# Patient Record
Sex: FEMALE | Race: WHITE | NOT HISPANIC OR LATINO | Employment: OTHER | URBAN - METROPOLITAN AREA
[De-identification: names, ages, dates, MRNs, and addresses within clinical notes are randomized per-mention and may not be internally consistent; named-entity substitution may affect disease eponyms.]

---

## 2023-10-09 ENCOUNTER — HOSPITAL ENCOUNTER (INPATIENT)
Facility: HOSPITAL | Age: 63
LOS: 9 days | Discharge: LEFT BEFORE DISCHARGE | DRG: 917 | End: 2023-10-18
Attending: INTERNAL MEDICINE | Admitting: STUDENT IN AN ORGANIZED HEALTH CARE EDUCATION/TRAINING PROGRAM
Payer: COMMERCIAL

## 2023-10-09 ENCOUNTER — APPOINTMENT (OUTPATIENT)
Dept: RADIOLOGY | Facility: HOSPITAL | Age: 63
DRG: 917 | End: 2023-10-09
Payer: COMMERCIAL

## 2023-10-09 ENCOUNTER — APPOINTMENT (OUTPATIENT)
Dept: CARDIOLOGY | Facility: HOSPITAL | Age: 63
DRG: 917 | End: 2023-10-09
Payer: COMMERCIAL

## 2023-10-09 DIAGNOSIS — M79.661 BILATERAL CALF PAIN: Primary | ICD-10-CM

## 2023-10-09 DIAGNOSIS — R57.0 SHOCK, CARDIOGENIC (MULTI): ICD-10-CM

## 2023-10-09 DIAGNOSIS — R00.1 BRADYCARDIA: ICD-10-CM

## 2023-10-09 DIAGNOSIS — M79.662 BILATERAL CALF PAIN: Primary | ICD-10-CM

## 2023-10-09 LAB
ABO GROUP (TYPE) IN BLOOD: NORMAL
ALBUMIN SERPL BCP-MCNC: 2.8 G/DL (ref 3.4–5)
ALBUMIN SERPL BCP-MCNC: 3 G/DL (ref 3.4–5)
ALBUMIN SERPL BCP-MCNC: 3.2 G/DL (ref 3.4–5)
ALP SERPL-CCNC: 112 U/L (ref 33–136)
ALP SERPL-CCNC: 145 U/L (ref 33–136)
ALP SERPL-CCNC: 85 U/L (ref 33–136)
ALT SERPL W P-5'-P-CCNC: 259 U/L (ref 7–45)
ALT SERPL W P-5'-P-CCNC: 279 U/L (ref 7–45)
ALT SERPL W P-5'-P-CCNC: 311 U/L (ref 7–45)
ANION GAP BLDA CALCULATED.4IONS-SCNC: 12 MMO/L (ref 10–25)
ANION GAP BLDA CALCULATED.4IONS-SCNC: 6 MMO/L (ref 10–25)
ANION GAP BLDMV CALCULATED.4IONS-SCNC: 5 MMO/L (ref 10–25)
ANION GAP BLDMV CALCULATED.4IONS-SCNC: 9 MMO/L (ref 10–25)
ANION GAP SERPL CALC-SCNC: 10 MMOL/L (ref 10–20)
ANION GAP SERPL CALC-SCNC: 11 MMOL/L (ref 10–20)
ANION GAP SERPL CALC-SCNC: 16 MMOL/L (ref 10–20)
ANTIBODY SCREEN: NORMAL
APAP SERPL-MCNC: <10 UG/ML
APTT PPP: 29 SECONDS (ref 27–38)
AST SERPL W P-5'-P-CCNC: 1358 U/L (ref 9–39)
AST SERPL W P-5'-P-CCNC: 735 U/L (ref 9–39)
AST SERPL W P-5'-P-CCNC: 843 U/L (ref 9–39)
BASE EXCESS BLDA CALC-SCNC: -1.2 MMOL/L (ref -2–3)
BASE EXCESS BLDA CALC-SCNC: -3.8 MMOL/L (ref -2–3)
BASE EXCESS BLDMV CALC-SCNC: -0.7 MMOL/L (ref -2–3)
BASE EXCESS BLDMV CALC-SCNC: -1.1 MMOL/L (ref -2–3)
BASOPHILS # BLD AUTO: 0.02 X10*3/UL (ref 0–0.1)
BASOPHILS NFR BLD AUTO: 0.2 %
BILIRUB SERPL-MCNC: 0.8 MG/DL (ref 0–1.2)
BODY TEMPERATURE: 37 DEGREES CELSIUS
BUN SERPL-MCNC: 11 MG/DL (ref 6–23)
BUN SERPL-MCNC: 11 MG/DL (ref 6–23)
BUN SERPL-MCNC: 14 MG/DL (ref 6–23)
CA-I BLDA-SCNC: 1.67 MMOL/L (ref 1.1–1.33)
CA-I BLDA-SCNC: 1.91 MMOL/L (ref 1.1–1.33)
CA-I BLDMV-SCNC: 1.63 MMOL/L (ref 1.1–1.33)
CA-I BLDMV-SCNC: 1.94 MMOL/L (ref 1.1–1.33)
CALCIUM SERPL-MCNC: 10.8 MG/DL (ref 8.6–10.6)
CALCIUM SERPL-MCNC: 11.7 MG/DL (ref 8.6–10.6)
CALCIUM SERPL-MCNC: 12.8 MG/DL (ref 8.6–10.6)
CHLORIDE BLD-SCNC: 92 MMOL/L (ref 98–107)
CHLORIDE BLD-SCNC: 96 MMOL/L (ref 98–107)
CHLORIDE BLDA-SCNC: 100 MMOL/L (ref 98–107)
CHLORIDE BLDA-SCNC: 92 MMOL/L (ref 98–107)
CHLORIDE SERPL-SCNC: 93 MMOL/L (ref 98–107)
CHLORIDE SERPL-SCNC: 97 MMOL/L (ref 98–107)
CHLORIDE SERPL-SCNC: 98 MMOL/L (ref 98–107)
CO2 SERPL-SCNC: 20 MMOL/L (ref 21–32)
CO2 SERPL-SCNC: 23 MMOL/L (ref 21–32)
CO2 SERPL-SCNC: 24 MMOL/L (ref 21–32)
CREAT SERPL-MCNC: 0.64 MG/DL (ref 0.5–1.05)
CREAT SERPL-MCNC: 0.81 MG/DL (ref 0.5–1.05)
CREAT SERPL-MCNC: 1.31 MG/DL (ref 0.5–1.05)
EJECTION FRACTION APICAL 4 CHAMBER: 51.9
EOSINOPHIL # BLD AUTO: 0.03 X10*3/UL (ref 0–0.7)
EOSINOPHIL NFR BLD AUTO: 0.3 %
ERYTHROCYTE [DISTWIDTH] IN BLOOD BY AUTOMATED COUNT: 10.8 % (ref 11.5–14.5)
GFR SERPL CREATININE-BSD FRML MDRD: 46 ML/MIN/1.73M*2
GFR SERPL CREATININE-BSD FRML MDRD: 82 ML/MIN/1.73M*2
GFR SERPL CREATININE-BSD FRML MDRD: >90 ML/MIN/1.73M*2
GLUCOSE BLD MANUAL STRIP-MCNC: 113 MG/DL (ref 74–99)
GLUCOSE BLD MANUAL STRIP-MCNC: 116 MG/DL (ref 74–99)
GLUCOSE BLD MANUAL STRIP-MCNC: 120 MG/DL (ref 74–99)
GLUCOSE BLD MANUAL STRIP-MCNC: 121 MG/DL (ref 74–99)
GLUCOSE BLD MANUAL STRIP-MCNC: 123 MG/DL (ref 74–99)
GLUCOSE BLD MANUAL STRIP-MCNC: 155 MG/DL (ref 74–99)
GLUCOSE BLD MANUAL STRIP-MCNC: 160 MG/DL (ref 74–99)
GLUCOSE BLD MANUAL STRIP-MCNC: 184 MG/DL (ref 74–99)
GLUCOSE BLD MANUAL STRIP-MCNC: 216 MG/DL (ref 74–99)
GLUCOSE BLD MANUAL STRIP-MCNC: 280 MG/DL (ref 74–99)
GLUCOSE BLD MANUAL STRIP-MCNC: 79 MG/DL (ref 74–99)
GLUCOSE BLD MANUAL STRIP-MCNC: 90 MG/DL (ref 74–99)
GLUCOSE BLD MANUAL STRIP-MCNC: 95 MG/DL (ref 74–99)
GLUCOSE BLD MANUAL STRIP-MCNC: 97 MG/DL (ref 74–99)
GLUCOSE BLD-MCNC: 100 MG/DL (ref 74–99)
GLUCOSE BLD-MCNC: 262 MG/DL (ref 74–99)
GLUCOSE BLDA-MCNC: 104 MG/DL (ref 74–99)
GLUCOSE BLDA-MCNC: 465 MG/DL (ref 74–99)
GLUCOSE SERPL-MCNC: 113 MG/DL (ref 74–99)
GLUCOSE SERPL-MCNC: 415 MG/DL (ref 74–99)
GLUCOSE SERPL-MCNC: 99 MG/DL (ref 74–99)
HCO3 BLDA-SCNC: 21.5 MMOL/L (ref 22–26)
HCO3 BLDA-SCNC: 22.4 MMOL/L (ref 22–26)
HCO3 BLDMV-SCNC: 24.8 MMOL/L (ref 22–26)
HCO3 BLDMV-SCNC: 24.9 MMOL/L (ref 22–26)
HCT VFR BLD AUTO: 32.2 % (ref 36–46)
HCT VFR BLD EST: 34 % (ref 36–46)
HCT VFR BLD EST: 35 % (ref 36–46)
HCT VFR BLD EST: 37 % (ref 36–46)
HCT VFR BLD EST: 39 % (ref 36–46)
HGB BLD-MCNC: 11.5 G/DL (ref 12–16)
HGB BLDA-MCNC: 11.4 G/DL (ref 12–16)
HGB BLDA-MCNC: 12.2 G/DL (ref 12–16)
HGB BLDMV-MCNC: 11.6 G/DL (ref 12–16)
HGB BLDMV-MCNC: 13 G/DL (ref 12–16)
IMM GRANULOCYTES # BLD AUTO: 0.07 X10*3/UL (ref 0–0.7)
IMM GRANULOCYTES NFR BLD AUTO: 0.8 % (ref 0–0.9)
INHALED O2 CONCENTRATION: 30 %
INHALED O2 CONCENTRATION: 40 %
INHALED O2 CONCENTRATION: 40 %
INHALED O2 CONCENTRATION: 50 %
INR PPP: 1.2 (ref 0.9–1.1)
LACTATE BLDA-SCNC: 1.7 MMOL/L (ref 0.4–2)
LACTATE BLDA-SCNC: 5.6 MMOL/L (ref 0.4–2)
LACTATE BLDMV-SCNC: 1.1 MMOL/L (ref 0.4–2)
LACTATE BLDMV-SCNC: 2.9 MMOL/L (ref 0.4–2)
LACTATE SERPL-SCNC: 1 MMOL/L (ref 0.4–2)
LACTATE SERPL-SCNC: 2.2 MMOL/L (ref 0.4–2)
LACTATE SERPL-SCNC: 6.3 MMOL/L (ref 0.4–2)
LYMPHOCYTES # BLD AUTO: 1.12 X10*3/UL (ref 1.2–4.8)
LYMPHOCYTES NFR BLD AUTO: 12.5 %
MAGNESIUM SERPL-MCNC: 1.36 MG/DL (ref 1.6–2.4)
MAGNESIUM SERPL-MCNC: 1.58 MG/DL (ref 1.6–2.4)
MCH RBC QN AUTO: 32.7 PG (ref 26–34)
MCHC RBC AUTO-ENTMCNC: 35.7 G/DL (ref 32–36)
MCV RBC AUTO: 92 FL (ref 80–100)
MONOCYTES # BLD AUTO: 0.15 X10*3/UL (ref 0.1–1)
MONOCYTES NFR BLD AUTO: 1.7 %
NEUTROPHILS # BLD AUTO: 7.54 X10*3/UL (ref 1.2–7.7)
NEUTROPHILS NFR BLD AUTO: 84.5 %
NRBC BLD-RTO: 0 /100 WBCS (ref 0–0)
OXYHGB MFR BLDA: 96.6 % (ref 94–98)
OXYHGB MFR BLDA: 97.4 % (ref 94–98)
OXYHGB MFR BLDMV: 67.7 % (ref 45–75)
OXYHGB MFR BLDMV: 71.7 % (ref 45–75)
PCO2 BLDA: 33 MM HG (ref 38–42)
PCO2 BLDA: 39 MM HG (ref 38–42)
PCO2 BLDMV: 44 MM HG (ref 41–51)
PCO2 BLDMV: 45 MM HG (ref 41–51)
PH BLDA: 7.35 PH (ref 7.38–7.42)
PH BLDA: 7.44 PH (ref 7.38–7.42)
PH BLDMV: 7.35 PH (ref 7.33–7.43)
PH BLDMV: 7.36 PH (ref 7.33–7.43)
PLATELET # BLD AUTO: 61 X10*3/UL (ref 150–450)
PMV BLD AUTO: 12.1 FL (ref 7.5–11.5)
PO2 BLDA: 112 MM HG (ref 85–95)
PO2 BLDA: 143 MM HG (ref 85–95)
PO2 BLDMV: 46 MM HG (ref 35–45)
PO2 BLDMV: 48 MM HG (ref 35–45)
POTASSIUM BLDA-SCNC: 2.7 MMOL/L (ref 3.5–5.3)
POTASSIUM BLDA-SCNC: 3.1 MMOL/L (ref 3.5–5.3)
POTASSIUM BLDMV-SCNC: 2.6 MMOL/L (ref 3.5–5.3)
POTASSIUM BLDMV-SCNC: 2.8 MMOL/L (ref 3.5–5.3)
POTASSIUM SERPL-SCNC: 3 MMOL/L (ref 3.5–5.3)
PROT SERPL-MCNC: 4.5 G/DL (ref 6.4–8.2)
PROT SERPL-MCNC: 4.7 G/DL (ref 6.4–8.2)
PROT SERPL-MCNC: 5.2 G/DL (ref 6.4–8.2)
PROTHROMBIN TIME: 13.8 SECONDS (ref 9.8–12.8)
RBC # BLD AUTO: 3.52 X10*6/UL (ref 4–5.2)
RH FACTOR (ANTIGEN D): NORMAL
SAO2 % BLDA: 100 % (ref 94–100)
SAO2 % BLDA: 99 % (ref 94–100)
SAO2 % BLDMV: 70 % (ref 45–75)
SAO2 % BLDMV: 73 % (ref 45–75)
SODIUM BLDA-SCNC: 122 MMOL/L (ref 136–145)
SODIUM BLDA-SCNC: 126 MMOL/L (ref 136–145)
SODIUM BLDMV-SCNC: 123 MMOL/L (ref 136–145)
SODIUM BLDMV-SCNC: 123 MMOL/L (ref 136–145)
SODIUM SERPL-SCNC: 126 MMOL/L (ref 136–145)
SODIUM SERPL-SCNC: 128 MMOL/L (ref 136–145)
SODIUM SERPL-SCNC: 129 MMOL/L (ref 136–145)
WBC # BLD AUTO: 8.9 X10*3/UL (ref 4.4–11.3)

## 2023-10-09 PROCEDURE — 85025 COMPLETE CBC W/AUTO DIFF WBC: CPT

## 2023-10-09 PROCEDURE — 33210 INSERT ELECTRD/PM CATH SNGL: CPT | Performed by: INTERNAL MEDICINE

## 2023-10-09 PROCEDURE — B214YZZ FLUOROSCOPY OF RIGHT HEART USING OTHER CONTRAST: ICD-10-PCS | Performed by: STUDENT IN AN ORGANIZED HEALTH CARE EDUCATION/TRAINING PROGRAM

## 2023-10-09 PROCEDURE — 93451 RIGHT HEART CATH: CPT | Performed by: INTERNAL MEDICINE

## 2023-10-09 PROCEDURE — 84295 ASSAY OF SERUM SODIUM: CPT

## 2023-10-09 PROCEDURE — C1894 INTRO/SHEATH, NON-LASER: HCPCS | Performed by: INTERNAL MEDICINE

## 2023-10-09 PROCEDURE — 99223 1ST HOSP IP/OBS HIGH 75: CPT | Performed by: EMERGENCY MEDICINE

## 2023-10-09 PROCEDURE — 36415 COLL VENOUS BLD VENIPUNCTURE: CPT

## 2023-10-09 PROCEDURE — 93010 ELECTROCARDIOGRAM REPORT: CPT | Performed by: INTERNAL MEDICINE

## 2023-10-09 PROCEDURE — 82947 ASSAY GLUCOSE BLOOD QUANT: CPT

## 2023-10-09 PROCEDURE — 93306 TTE W/DOPPLER COMPLETE: CPT

## 2023-10-09 PROCEDURE — 37799 UNLISTED PX VASCULAR SURGERY: CPT | Performed by: STUDENT IN AN ORGANIZED HEALTH CARE EDUCATION/TRAINING PROGRAM

## 2023-10-09 PROCEDURE — 93503 INSERT/PLACE HEART CATHETER: CPT | Performed by: INTERNAL MEDICINE

## 2023-10-09 PROCEDURE — 2500000004 HC RX 250 GENERAL PHARMACY W/ HCPCS (ALT 636 FOR OP/ED)

## 2023-10-09 PROCEDURE — 80143 DRUG ASSAY ACETAMINOPHEN: CPT | Performed by: EMERGENCY MEDICINE

## 2023-10-09 PROCEDURE — 71045 X-RAY EXAM CHEST 1 VIEW: CPT | Performed by: RADIOLOGY

## 2023-10-09 PROCEDURE — 94002 VENT MGMT INPAT INIT DAY: CPT

## 2023-10-09 PROCEDURE — 0BH17EZ INSERTION OF ENDOTRACHEAL AIRWAY INTO TRACHEA, VIA NATURAL OR ARTIFICIAL OPENING: ICD-10-PCS | Performed by: STUDENT IN AN ORGANIZED HEALTH CARE EDUCATION/TRAINING PROGRAM

## 2023-10-09 PROCEDURE — 2580000001 HC RX 258 IV SOLUTIONS

## 2023-10-09 PROCEDURE — 93306 TTE W/DOPPLER COMPLETE: CPT | Performed by: INTERNAL MEDICINE

## 2023-10-09 PROCEDURE — 02HQ32Z INSERTION OF MONITORING DEVICE INTO RIGHT PULMONARY ARTERY, PERCUTANEOUS APPROACH: ICD-10-PCS | Performed by: STUDENT IN AN ORGANIZED HEALTH CARE EDUCATION/TRAINING PROGRAM

## 2023-10-09 PROCEDURE — 2720000007 HC OR 272 NO HCPCS: Performed by: INTERNAL MEDICINE

## 2023-10-09 PROCEDURE — 83605 ASSAY OF LACTIC ACID: CPT | Performed by: INTERNAL MEDICINE

## 2023-10-09 PROCEDURE — 4A023N6 MEASUREMENT OF CARDIAC SAMPLING AND PRESSURE, RIGHT HEART, PERCUTANEOUS APPROACH: ICD-10-PCS | Performed by: STUDENT IN AN ORGANIZED HEALTH CARE EDUCATION/TRAINING PROGRAM

## 2023-10-09 PROCEDURE — 2500000005 HC RX 250 GENERAL PHARMACY W/O HCPCS: Performed by: INTERNAL MEDICINE

## 2023-10-09 PROCEDURE — 82247 BILIRUBIN TOTAL: CPT

## 2023-10-09 PROCEDURE — 82330 ASSAY OF CALCIUM: CPT

## 2023-10-09 PROCEDURE — 83605 ASSAY OF LACTIC ACID: CPT

## 2023-10-09 PROCEDURE — 82805 BLOOD GASES W/O2 SATURATION: CPT | Performed by: STUDENT IN AN ORGANIZED HEALTH CARE EDUCATION/TRAINING PROGRAM

## 2023-10-09 PROCEDURE — 85730 THROMBOPLASTIN TIME PARTIAL: CPT

## 2023-10-09 PROCEDURE — 99291 CRITICAL CARE FIRST HOUR: CPT

## 2023-10-09 PROCEDURE — 83735 ASSAY OF MAGNESIUM: CPT

## 2023-10-09 PROCEDURE — 86850 RBC ANTIBODY SCREEN: CPT

## 2023-10-09 PROCEDURE — 2020000001 HC ICU ROOM DAILY

## 2023-10-09 PROCEDURE — 71045 X-RAY EXAM CHEST 1 VIEW: CPT | Mod: FY

## 2023-10-09 PROCEDURE — 4A133B3 MONITORING OF ARTERIAL PRESSURE, PULMONARY, PERCUTANEOUS APPROACH: ICD-10-PCS | Performed by: STUDENT IN AN ORGANIZED HEALTH CARE EDUCATION/TRAINING PROGRAM

## 2023-10-09 PROCEDURE — 74018 RADEX ABDOMEN 1 VIEW: CPT | Performed by: RADIOLOGY

## 2023-10-09 PROCEDURE — 4A1239Z MONITORING OF CARDIAC OUTPUT, PERCUTANEOUS APPROACH: ICD-10-PCS | Performed by: STUDENT IN AN ORGANIZED HEALTH CARE EDUCATION/TRAINING PROGRAM

## 2023-10-09 PROCEDURE — 74018 RADEX ABDOMEN 1 VIEW: CPT

## 2023-10-09 PROCEDURE — 5A1945Z RESPIRATORY VENTILATION, 24-96 CONSECUTIVE HOURS: ICD-10-PCS | Performed by: STUDENT IN AN ORGANIZED HEALTH CARE EDUCATION/TRAINING PROGRAM

## 2023-10-09 PROCEDURE — 93005 ELECTROCARDIOGRAM TRACING: CPT

## 2023-10-09 PROCEDURE — 2500000005 HC RX 250 GENERAL PHARMACY W/O HCPCS

## 2023-10-09 PROCEDURE — 2500000004 HC RX 250 GENERAL PHARMACY W/ HCPCS (ALT 636 FOR OP/ED): Performed by: STUDENT IN AN ORGANIZED HEALTH CARE EDUCATION/TRAINING PROGRAM

## 2023-10-09 RX ORDER — MAGNESIUM SULFATE HEPTAHYDRATE 40 MG/ML
4 INJECTION, SOLUTION INTRAVENOUS ONCE
Status: COMPLETED | OUTPATIENT
Start: 2023-10-09 | End: 2023-10-10

## 2023-10-09 RX ORDER — LIDOCAINE HYDROCHLORIDE 10 MG/ML
INJECTION, SOLUTION EPIDURAL; INFILTRATION; INTRACAUDAL; PERINEURAL AS NEEDED
Status: DISCONTINUED | OUTPATIENT
Start: 2023-10-09 | End: 2023-10-09 | Stop reason: HOSPADM

## 2023-10-09 RX ORDER — MAGNESIUM SULFATE HEPTAHYDRATE 40 MG/ML
2 INJECTION, SOLUTION INTRAVENOUS ONCE
Status: COMPLETED | OUTPATIENT
Start: 2023-10-09 | End: 2023-10-09

## 2023-10-09 RX ORDER — MIDAZOLAM HYDROCHLORIDE 1 MG/ML
2 INJECTION INTRAMUSCULAR; INTRAVENOUS ONCE
Status: DISCONTINUED | OUTPATIENT
Start: 2023-10-09 | End: 2023-10-10

## 2023-10-09 RX ORDER — MIDAZOLAM HYDROCHLORIDE 1 MG/ML
INJECTION INTRAMUSCULAR; INTRAVENOUS
Status: COMPLETED
Start: 2023-10-09 | End: 2023-10-09

## 2023-10-09 RX ORDER — POTASSIUM CHLORIDE 14.9 MG/ML
20 INJECTION INTRAVENOUS
Status: DISCONTINUED | OUTPATIENT
Start: 2023-10-09 | End: 2023-10-09

## 2023-10-09 RX ORDER — CYCLOSPORINE 0.5 MG/ML
1 EMULSION OPHTHALMIC EVERY 12 HOURS
COMMUNITY
Start: 2023-07-29

## 2023-10-09 RX ORDER — MIDAZOLAM IN NACL, ISO-OSMOTIC 100MG/0.1L
3 PLASTIC BAG, INJECTION (ML) INTRAVENOUS CONTINUOUS
Status: DISCONTINUED | OUTPATIENT
Start: 2023-10-09 | End: 2023-10-10

## 2023-10-09 RX ORDER — POTASSIUM CHLORIDE 29.8 MG/ML
40 INJECTION INTRAVENOUS ONCE
Status: COMPLETED | OUTPATIENT
Start: 2023-10-09 | End: 2023-10-09

## 2023-10-09 RX ORDER — HYDROCODONE BITARTRATE AND ACETAMINOPHEN 10; 325 MG/1; MG/1
1 TABLET ORAL
COMMUNITY
Start: 2023-09-13

## 2023-10-09 RX ORDER — TIZANIDINE 4 MG/1
8 TABLET ORAL NIGHTLY PRN
COMMUNITY

## 2023-10-09 RX ORDER — NOREPINEPHRINE BITARTRATE/D5W 8 MG/250ML
.01-1 PLASTIC BAG, INJECTION (ML) INTRAVENOUS CONTINUOUS
Status: DISCONTINUED | OUTPATIENT
Start: 2023-10-09 | End: 2023-10-10

## 2023-10-09 RX ORDER — DEXTROSE MONOHYDRATE 100 MG/ML
0.3 INJECTION, SOLUTION INTRAVENOUS ONCE AS NEEDED
Status: DISCONTINUED | OUTPATIENT
Start: 2023-10-09 | End: 2023-10-09

## 2023-10-09 RX ORDER — LOSARTAN POTASSIUM 50 MG/1
100 TABLET ORAL DAILY
COMMUNITY

## 2023-10-09 RX ORDER — DEXTROSE 50 % IN WATER (D50W) INTRAVENOUS SYRINGE
25
Status: DISCONTINUED | OUTPATIENT
Start: 2023-10-09 | End: 2023-10-18 | Stop reason: HOSPADM

## 2023-10-09 RX ORDER — DEXTROSE MONOHYDRATE 100 MG/ML
75 INJECTION, SOLUTION INTRAVENOUS ONCE AS NEEDED
Status: COMPLETED | OUTPATIENT
Start: 2023-10-09 | End: 2023-10-09

## 2023-10-09 RX ORDER — VERAPAMIL HYDROCHLORIDE 240 MG/1
240 TABLET, FILM COATED, EXTENDED RELEASE ORAL NIGHTLY
COMMUNITY

## 2023-10-09 RX ORDER — POLYETHYLENE GLYCOL 3350 17 G/17G
17 POWDER, FOR SOLUTION ORAL DAILY
Status: DISCONTINUED | OUTPATIENT
Start: 2023-10-09 | End: 2023-10-10

## 2023-10-09 RX ORDER — MULTIVITAMIN
1 TABLET ORAL DAILY
COMMUNITY

## 2023-10-09 RX ORDER — MIDAZOLAM HYDROCHLORIDE 1 MG/ML
2 INJECTION INTRAMUSCULAR; INTRAVENOUS ONCE
Status: COMPLETED | OUTPATIENT
Start: 2023-10-09 | End: 2023-10-09

## 2023-10-09 RX ORDER — CARVEDILOL 25 MG/1
25 TABLET ORAL
COMMUNITY

## 2023-10-09 RX ORDER — POTASSIUM CHLORIDE 29.8 MG/ML
40 INJECTION INTRAVENOUS ONCE
Status: DISCONTINUED | OUTPATIENT
Start: 2023-10-09 | End: 2023-10-11

## 2023-10-09 RX ORDER — FENTANYL CITRATE-0.9 % NACL/PF 10 MCG/ML
PLASTIC BAG, INJECTION (ML) INTRAVENOUS
Status: COMPLETED
Start: 2023-10-09 | End: 2023-10-09

## 2023-10-09 RX ORDER — POLYETHYLENE GLYCOL 3350 17 G/17G
17 POWDER, FOR SOLUTION ORAL DAILY
Status: DISCONTINUED | OUTPATIENT
Start: 2023-10-09 | End: 2023-10-09

## 2023-10-09 RX ORDER — FENTANYL CITRATE-0.9 % NACL/PF 10 MCG/ML
25-200 PLASTIC BAG, INJECTION (ML) INTRAVENOUS CONTINUOUS
Status: DISCONTINUED | OUTPATIENT
Start: 2023-10-09 | End: 2023-10-10

## 2023-10-09 RX ORDER — HYDROXYZINE HYDROCHLORIDE 50 MG/1
50 TABLET, FILM COATED ORAL NIGHTLY PRN
COMMUNITY

## 2023-10-09 RX ADMIN — VASOPRESSIN 0.03 UNITS/MIN: 0.2 INJECTION INTRAVENOUS at 11:31

## 2023-10-09 RX ADMIN — MAGNESIUM SULFATE HEPTAHYDRATE 4 G: 40 INJECTION, SOLUTION INTRAVENOUS at 23:15

## 2023-10-09 RX ADMIN — PERFLUTREN 2 ML OF DILUTION: 6.52 INJECTION, SUSPENSION INTRAVENOUS at 16:08

## 2023-10-09 RX ADMIN — Medication 150 MCG/HR: at 11:30

## 2023-10-09 RX ADMIN — Medication 250 MCG/HR: at 13:48

## 2023-10-09 RX ADMIN — SODIUM CHLORIDE, SODIUM LACTATE, POTASSIUM CHLORIDE, AND CALCIUM CHLORIDE 1000 ML: 600; 310; 30; 20 INJECTION, SOLUTION INTRAVENOUS at 12:45

## 2023-10-09 RX ADMIN — INSULIN HUMAN 56 UNITS/HR: 1 INJECTION, SOLUTION INTRAVENOUS at 11:25

## 2023-10-09 RX ADMIN — Medication 0.7 MG/KG/HR: at 11:35

## 2023-10-09 RX ADMIN — Medication 150 MCG/HR: at 19:27

## 2023-10-09 RX ADMIN — MAGNESIUM SULFATE HEPTAHYDRATE 2 G: 40 INJECTION, SOLUTION INTRAVENOUS at 12:23

## 2023-10-09 RX ADMIN — ACETYLCYSTEINE 8.6 G: 200 INJECTION, SOLUTION INTRAVENOUS at 14:49

## 2023-10-09 RX ADMIN — DEXTROSE MONOHYDRATE 75 ML/HR: 100 INJECTION, SOLUTION INTRAVENOUS at 11:33

## 2023-10-09 RX ADMIN — POTASSIUM CHLORIDE 40 MEQ: 29.8 INJECTION, SOLUTION INTRAVENOUS at 12:21

## 2023-10-09 RX ADMIN — ACETYLCYSTEINE 5.6 G: 200 INJECTION, SOLUTION INTRAVENOUS at 23:58

## 2023-10-09 RX ADMIN — MIDAZOLAM HYDROCHLORIDE 2 MG: 1 INJECTION INTRAMUSCULAR; INTRAVENOUS at 09:15

## 2023-10-09 RX ADMIN — POTASSIUM CHLORIDE 40 MEQ: 29.8 INJECTION, SOLUTION INTRAVENOUS at 19:56

## 2023-10-09 RX ADMIN — NOREPINEPHRINE BITARTRATE 0.2 MCG/KG/MIN: 8 INJECTION, SOLUTION INTRAVENOUS at 11:31

## 2023-10-09 RX ADMIN — MIDAZOLAM 1 MG/HR: 5 INJECTION INTRAMUSCULAR; INTRAVENOUS at 14:12

## 2023-10-09 RX ADMIN — MIDAZOLAM HYDROCHLORIDE 2 MG: 1 INJECTION, SOLUTION INTRAMUSCULAR; INTRAVENOUS at 09:15

## 2023-10-09 RX ADMIN — ACETYLCYSTEINE 2.8 G: 200 INJECTION, SOLUTION INTRAVENOUS at 16:02

## 2023-10-09 SDOH — SOCIAL STABILITY: SOCIAL INSECURITY: DO YOU FEEL UNSAFE GOING BACK TO THE PLACE WHERE YOU ARE LIVING?: UNABLE TO ASSESS

## 2023-10-09 SDOH — SOCIAL STABILITY: SOCIAL INSECURITY: ARE THERE ANY APPARENT SIGNS OF INJURIES/BEHAVIORS THAT COULD BE RELATED TO ABUSE/NEGLECT?: UNABLE TO ASSESS

## 2023-10-09 SDOH — SOCIAL STABILITY: SOCIAL INSECURITY: ABUSE: ADULT

## 2023-10-09 SDOH — SOCIAL STABILITY: SOCIAL INSECURITY: ARE YOU OR HAVE YOU BEEN THREATENED OR ABUSED PHYSICALLY, EMOTIONALLY, OR SEXUALLY BY ANYONE?: UNABLE TO ASSESS

## 2023-10-09 SDOH — SOCIAL STABILITY: SOCIAL INSECURITY: WERE YOU ABLE TO COMPLETE ALL THE BEHAVIORAL HEALTH SCREENINGS?: NO

## 2023-10-09 SDOH — SOCIAL STABILITY: SOCIAL INSECURITY: DOES ANYONE TRY TO KEEP YOU FROM HAVING/CONTACTING OTHER FRIENDS OR DOING THINGS OUTSIDE YOUR HOME?: UNABLE TO ASSESS

## 2023-10-09 SDOH — SOCIAL STABILITY: SOCIAL INSECURITY: DO YOU FEEL ANYONE HAS EXPLOITED OR TAKEN ADVANTAGE OF YOU FINANCIALLY OR OF YOUR PERSONAL PROPERTY?: UNABLE TO ASSESS

## 2023-10-09 SDOH — SOCIAL STABILITY: SOCIAL INSECURITY: HAS ANYONE EVER THREATENED TO HURT YOUR FAMILY OR YOUR PETS?: UNABLE TO ASSESS

## 2023-10-09 ASSESSMENT — COGNITIVE AND FUNCTIONAL STATUS - GENERAL: PATIENT BASELINE BEDBOUND: NO

## 2023-10-09 ASSESSMENT — PAIN - FUNCTIONAL ASSESSMENT
PAIN_FUNCTIONAL_ASSESSMENT: CPOT (CRITICAL CARE PAIN OBSERVATION TOOL)

## 2023-10-09 NOTE — CONSULTS
Reason For Consult  Overdose-verapamil, losartan, carvedilol; intubated on vaso, levo and insulin gtt    History Of Present Illness  Kathleen Hamman is a 62 y.o. female from an outside hospital after being found unresponsive at home. She was noted to be bradycardic to the 30s, hypotensive to 47/28 and confused. EMS started to pace the patient and she was brought to the OSH where she was intubated, started on ketamine/fent for sedation. Levo, vaso and HIE was started with improvement in her BP and HR. She was transferred to the CICU where she was then taken to the cath lab for a TVP. Med tox was consulted post cath lab/TVP for management of her toxicity.     Upon evaluation in the CICU the patient is intubated, sedated but able to shake head yes/no to questions. She confirmed taking her medications along with wine. She denied intentional self-harm. She did shake her head yes when asked if she had taken acetaminophen but denied other coingestions.     She denied current CP, abdominal pain. ROS was otherwise limited due to her intubation and lack of family at the bedside.     Past Medical History  Unable to be obtained due to intubation/lack of family at bedside    Surgical History  Unable to be obtained due to intubation/lack of family at bedside   Social History  Unable to be obtained due to intubation/lack of family at bedside    Family History  Unable to be obtained due to intubation/lack of family at bedside     Allergies  Unable to be obtained due to intubation/lack of family at bedside    Review of Systems  Limited due to intubation/lack of family at the bedside     Physical Exam  Gen: intubated, sedated but shakes head yes/no; NAD  HEENT: NCAT, ETT in place, MMM, EOMI, no scleral icterus  CV: bradycardic but regular rhythm, no MRG, no LE edema, extremities are well perfused  Resp: intubated, no increased WOB, CTAB  Abdomen: NT, ND, no rigidity  Ext: no edema, well perfused  Skin: warm, no rash/lesions  Neuro:  "sedated but awakens to verbal stimuli, answers questions appropriately; no clonus, rigidity  Psych: intubated, unable to assess     Last Recorded Vitals  Blood pressure 164/69, pulse 52, temperature 35.5 °C (95.9 °F), temperature source Temporal, resp. rate 20, height 1.702 m (5' 7\"), weight 56.8 kg (125 lb 3.5 oz), SpO2 99 %.    Relevant Results  Results for orders placed or performed during the hospital encounter of 10/09/23 (from the past 24 hour(s))   Comprehensive metabolic panel   Result Value Ref Range    Glucose 415 (H) 74 - 99 mg/dL    Sodium 126 (L) 136 - 145 mmol/L    Potassium 3.0 (L) 3.5 - 5.3 mmol/L    Chloride 93 (L) 98 - 107 mmol/L    Bicarbonate 20 (L) 21 - 32 mmol/L    Anion Gap 16 10 - 20 mmol/L    Urea Nitrogen 14 6 - 23 mg/dL    Creatinine 1.31 (H) 0.50 - 1.05 mg/dL    eGFR 46 (L) >60 mL/min/1.73m*2    Calcium 12.8 (H) 8.6 - 10.6 mg/dL    Albumin 3.2 (L) 3.4 - 5.0 g/dL    Alkaline Phosphatase 145 (H) 33 - 136 U/L    Total Protein 5.2 (L) 6.4 - 8.2 g/dL    AST 1,358 (H) 9 - 39 U/L    Bilirubin, Total 0.8 0.0 - 1.2 mg/dL     (H) 7 - 45 U/L   CBC and Auto Differential   Result Value Ref Range    WBC 8.9 4.4 - 11.3 x10*3/uL    nRBC 0.0 0.0 - 0.0 /100 WBCs    RBC 3.52 (L) 4.00 - 5.20 x10*6/uL    Hemoglobin 11.5 (L) 12.0 - 16.0 g/dL    Hematocrit 32.2 (L) 36.0 - 46.0 %    MCV 92 80 - 100 fL    MCH 32.7 26.0 - 34.0 pg    MCHC 35.7 32.0 - 36.0 g/dL    RDW 10.8 (L) 11.5 - 14.5 %    Platelets 61 (L) 150 - 450 x10*3/uL    MPV 12.1 (H) 7.5 - 11.5 fL    Neutrophils % 84.5 40.0 - 80.0 %    Immature Granulocytes %, Automated 0.8 0.0 - 0.9 %    Lymphocytes % 12.5 13.0 - 44.0 %    Monocytes % 1.7 2.0 - 10.0 %    Eosinophils % 0.3 0.0 - 6.0 %    Basophils % 0.2 0.0 - 2.0 %    Neutrophils Absolute 7.54 1.20 - 7.70 x10*3/uL    Immature Granulocytes Absolute, Automated 0.07 0.00 - 0.70 x10*3/uL    Lymphocytes Absolute 1.12 (L) 1.20 - 4.80 x10*3/uL    Monocytes Absolute 0.15 0.10 - 1.00 x10*3/uL    Eosinophils " Absolute 0.03 0.00 - 0.70 x10*3/uL    Basophils Absolute 0.02 0.00 - 0.10 x10*3/uL   Magnesium   Result Value Ref Range    Magnesium 1.36 (L) 1.60 - 2.40 mg/dL   Type and Screen   Result Value Ref Range    ABO TYPE B     Rh TYPE POS     ANTIBODY SCREEN NEG    Coagulation Screen   Result Value Ref Range    Protime 13.8 (H) 9.8 - 12.8 seconds    INR 1.2 (H) 0.9 - 1.1    aPTT 29 27 - 38 seconds   Lactate   Result Value Ref Range    Lactate 6.3 (HH) 0.4 - 2.0 mmol/L   POCT GLUCOSE   Result Value Ref Range    POCT Glucose 280 (H) 74 - 99 mg/dL   BLOOD GAS MIXED VENOUS FULL PANEL   Result Value Ref Range    POCT pH, Mixed 7.35 7.33 - 7.43 pH    POCT pCO2, Mixed 45 41 - 51 mm Hg    POCT pO2, Mixed 46 (H) 35 - 45 mm Hg    POCT SO2, Mixed 70 45 - 75 %    POCT Oxy Hemoglobin, Mixed 67.7 45.0 - 75.0 %    POCT Hematocrit Calculated, Mixed 39.0 36.0 - 46.0 %    POCT Sodium, Mixed 123 (L) 136 - 145 mmol/L    POCT Potassium, Mixed 2.6 (LL) 3.5 - 5.3 mmol/L    POCT Chloride, Mixed 92 (L) 98 - 107 mmol/L    POCT Ionized Calcium, Mixed 1.94 (H) 1.10 - 1.33 mmol/L    POCT Glucose, Mixed 262 (H) 74 - 99 mg/dL    POCT Lactate, Mixed 2.9 (H) 0.4 - 2.0 mmol/L    POCT Base Excess, Mixed -1.1 -2.0 - 3.0 mmol/L    POCT HCO3 Calculated, Mixed 24.8 22.0 - 26.0 mmol/L    POCT Hemoglobin, Mixed 13.0 12.0 - 16.0 g/dL    POCT Anion Gap, Mixed 9 (L) 10 - 25 mmo/L    Patient Temperature 37.0 degrees Celsius    FiO2 40 %   POCT GLUCOSE   Result Value Ref Range    POCT Glucose 216 (H) 74 - 99 mg/dL   POCT GLUCOSE   Result Value Ref Range    POCT Glucose 184 (H) 74 - 99 mg/dL     Scheduled medications   Medication Dose Route Frequency    acetylcysteine  50 mg/kg (Adjusted) intravenous Once    acetylcysteine  100 mg/kg (Adjusted) intravenous Once    acetylcysteine  150 mg/kg intravenous Once    magnesium sulfate  2 g intravenous Once    polyethylene glycol  17 g oral Daily    potassium chloride  40 mEq intravenous Once     PRN medications   Medication     dextrose    glucagon    midazolam    oxygen     fentaNYL, Last Rate: 150 mcg/hr (10/09/23 1130)  insulin regular infusion, Last Rate: 40 Units/hr (10/09/23 1221)  midazolam  norepinephrine, Last Rate: Stopped (10/09/23 1240)         Assessment/Plan     This is a 63 y/o F currently admitted to the CICU after being found unresponsive, hypotensive and bradycardic. She is s/p intubation and TVP placement. Med tox was consulted for further recs    #respiratory failure  #acute toxic encephalopathy  #bradycardia  #hypotension  #hyperglycemia  #hyponatremia  #hypokalemia  #transaminitis    The patient is showing s/sx consistent with verapamil, carvedilol and losartan toxicity with her bradycardia/hypotension/hyperglycemia being most consistent with verapamil. She has received her TVP and is well perfused but remains bradycardic and hyperglycemic despite her insulin gtt.    Hyperglycemia in the setting of CCB overdoses is a poor prognositic indicator due to it being an indicator of the calcium channel blockade on the pancreatic beta islet cell. The insulin gtt will increase cardiac output by providing increase in glucose uptake by the cardiac myocytes and is used as essentially another vasopressor.     Dosing for pressors, in the setting of acute toxicity can fall outside of the normal guidelines for other shock states due to the toxicokinetic variations that can be seen, erratic absorption and difference in physiologies. The maximum dose of a pressor in the setting of an overdose is not a real dose and should be based on each patient's clinic picture.     The patient's elevated AST>ALT with increase in PT can be seen from her hypotension but can also be present in acetaminophen overdoses when the patient has presented late. As there is no known LADAN, recommend adding acetaminophen level to her labs, starting NAC and repeating LFTs to see new baseline now that she is perfusing appropriately.     Recommendations for  hypotension/bradycardia  -Calcium replacement and electrolyte management to drive up her calcium to help overcome calcium channel blockade  -insulin gtt wean by 5u/hr for a goal MAP of 65. If drip is increased please continue BS checks [id number] after adjustment  -wean levo/vaso as tolerated for goal map of >65  -TVP management per primary team    Recs for transaminitis  -Repeat LFTs now that pt is well perfused  -follow up on apap level added to am labs  -start NAC infusion, if LFTs are wnl upon repeat labs and APAP level is negative, can stop the infusion. If they remain elevated and/or if the APAP level is detectable, complete 21 hour course and repeat LFTs/PT 4 hours prior to completion    Recs for overdose/drug toxicity  -psych consult when appropriate due to degree of toxicity and concern for possible overdose    Recs were discussed with bedside nursing and the primary team.     Med tox will continue to follow, please contact through secure messaging for questions/concerns      I have personally spent 90 minutes of critical care time, exclusive of time spent on any procedures, in evaluation and management of this critically ill patient’s condition of verapamil, losartan and carvedilol toxicity with increased LFTs. I provided the following critical care treatment: assessment, documentation, taking additional history, discussion with primary team and nursing, management of antidotes.    Bridgette Eckert, DO

## 2023-10-09 NOTE — H&P
History Of Present Illness  Kathleen Hamman is a 62 y.o. female presenting with bradycardia needing percutaneous pacing. Intubated and sedated.      Past Medical History  No past medical history on file.    Surgical History  No past surgical history on file.     Social History  She has no history on file for tobacco use, alcohol use, and drug use.    Family History  No family history on file.     Allergies  Patient has no allergy information on record.    Review of Systems   All other systems reviewed and are negative.      Physical Exam  General: Sedated and intubated  CV: Paced rhythm @90   Pulmonary: CTAB  GI: soft abd, NT/ND  LE: No LE edema    Last Recorded Vitals  No results found for this or any previous visit (from the past 24 hour(s)).            Assessment/Plan   Principal Problem:    Bradycardia    Plan for TVP         I spent 30 minutes in the professional and overall care of this patient.      Anastasia Padilla MD

## 2023-10-09 NOTE — H&P
History Of Present Illness  Kathleen Hamman is a 62 y.o. female presenting with bradycardia requiring transcutaneous pacing. She is intubated and sedated.    Patient sent by helicopter from [facility] for bradycardia following verapamil intoxication. She was being transcutaneously paced on arrival  at 90. Per EMS and chart review, patient took carvedilol, verapamil, and losartan at home for a headache and drank some wine then later was noted by  to be unresponsive. Unclear quantity of pills taken. On EMS arrival, patient was unresponsive with reported BP 47/28 and HR 30. EMS started to pace patient and she was taken to [facility] ED. She was given 1g calcium chloride IV and 0.1mg/ml IV epi per chart documents. Patient was being paced at 50 veróncia amps and rate of 70 on arrival to  ED. Paced at 100 with rate 70 in ED. Her vitals on arrival to  were T35, HR 70, RR 14, 100% on 4L NC, BP 52/38 with MAP 45. Patient intubated at approximately 0342 am 10/9. Patient had R radial arterial line replaced with R fem arterial line placed at .  ECG showing sinus bradycardia.     Labs from  ED 10/9 2:20 am  BUN 13, Na 123, K 5, Cl 98, venous CO2 13, glucose 276, Cr 1.2, BUN/Cr 10.8, total protein 5.6, ALB 3.1,  calcium 11.4, T bili 0.5, alk phos 70, GOT 70, GPT 70, osmolality 258, alcohol 91, urine tox positive for opiates, troponin 3, TSH 15.42, T4 free 0.73, acetaminophen 16.6, salicylate level <3, PT 10.9, INR 1, aPTT 28  CBC: 3.9/11/32.5/plts clumped    UA: large blood, 100mg/dl protein, nitrite negative, moderate leukocyte esterase, >150, WBC >30, few bacteria, 19 epithelial cells    ABG 10/9 02:59: 7.175/18.6/128.9  ABG 10/9 04:20: 6.994/20.5/225    Vent settings at OSH: Fio2 50%, PEEP 5, rate 20    CXR at OSH: ET tube terminating 6 cm superior to suzy, enteric tube tip at gastric body     Patient arrives to  CICU intubated and sedated with ketamine gtt @0.2. She is on Levophed 0.2 and vasopressin 0.03. She  is on D10 infusion @150 and insulin gtt @56 units/hr. Fentanyl gtt added for sedation @150. Vitals on arrival to CICU, T35.5, HR 54 being transcutaneously paced at 90. RR 18, /69. Vent settings in CICU are RR 20, , PEEP 5, FiO2 40%. Ketamine since discontinued and fentanyl and midazolam gtt for sedation. Patient immediately went to cath lab for TVP placement. R fem swan ronaldo catheter also placed in cath lab. Later in the morning, patient weaned off vasopressin then Levophed. Insulin being weaned down, currently @25 units/hr. Will obtain further collateral from  when able to reach.      Past Medical History  No past medical history on file.    Surgical History  No past surgical history on file.     Social History  She has no history on file for tobacco use, alcohol use, and drug use.    Family History  No family history on file.     Allergies  Patient has no allergy information on record.    Review of Systems   Unable to perform ROS: Intubated        Physical Exam  Constitutional:       Appearance: She is not diaphoretic.   HENT:      Head: Normocephalic and atraumatic.      Nose: Nose normal.   Eyes:      Extraocular Movements: Extraocular movements intact.      Conjunctiva/sclera: Conjunctivae normal.      Pupils: Pupils are equal, round, and reactive to light.      Comments: Bilateral conjunctival chemosis, mydriatic pupils bilaterally    Cardiovascular:      Rate and Rhythm: Regular rhythm. Bradycardia present.      Pulses: Normal pulses.   Pulmonary:      Breath sounds: No wheezing.      Comments: intubated  Abdominal:      General: Abdomen is flat. There is no distension.      Palpations: Abdomen is soft.      Tenderness: There is no abdominal tenderness.      Comments: Hyperactive bowel sounds   Musculoskeletal:      Right lower leg: No edema.      Left lower leg: No edema.   Skin:     General: Skin is dry.      Comments: Hands cool, bilateral feet warm    Neurological:      Comments:  Follows commands          Last Recorded Vitals  Pulse 54, temperature 35.5 °C (95.9 °F), temperature source Temporal, resp. rate 18, weight 56.8 kg (125 lb 3.5 oz), SpO2 100 %.    Relevant Results  Results for orders placed or performed during the hospital encounter of 10/09/23 (from the past 24 hour(s))   Comprehensive metabolic panel   Result Value Ref Range    Glucose 415 (H) 74 - 99 mg/dL    Sodium 126 (L) 136 - 145 mmol/L    Potassium 3.0 (L) 3.5 - 5.3 mmol/L    Chloride 93 (L) 98 - 107 mmol/L    Bicarbonate 20 (L) 21 - 32 mmol/L    Anion Gap 16 10 - 20 mmol/L    Urea Nitrogen 14 6 - 23 mg/dL    Creatinine 1.31 (H) 0.50 - 1.05 mg/dL    eGFR 46 (L) >60 mL/min/1.73m*2    Calcium 12.8 (H) 8.6 - 10.6 mg/dL    Albumin 3.2 (L) 3.4 - 5.0 g/dL    Alkaline Phosphatase 145 (H) 33 - 136 U/L    Total Protein 5.2 (L) 6.4 - 8.2 g/dL    AST 1,358 (H) 9 - 39 U/L    Bilirubin, Total 0.8 0.0 - 1.2 mg/dL     (H) 7 - 45 U/L   CBC and Auto Differential   Result Value Ref Range    WBC 8.9 4.4 - 11.3 x10*3/uL    nRBC 0.0 0.0 - 0.0 /100 WBCs    RBC 3.52 (L) 4.00 - 5.20 x10*6/uL    Hemoglobin 11.5 (L) 12.0 - 16.0 g/dL    Hematocrit 32.2 (L) 36.0 - 46.0 %    MCV 92 80 - 100 fL    MCH 32.7 26.0 - 34.0 pg    MCHC 35.7 32.0 - 36.0 g/dL    RDW 10.8 (L) 11.5 - 14.5 %    Platelets 61 (L) 150 - 450 x10*3/uL    MPV 12.1 (H) 7.5 - 11.5 fL    Neutrophils % 84.5 40.0 - 80.0 %    Immature Granulocytes %, Automated 0.8 0.0 - 0.9 %    Lymphocytes % 12.5 13.0 - 44.0 %    Monocytes % 1.7 2.0 - 10.0 %    Eosinophils % 0.3 0.0 - 6.0 %    Basophils % 0.2 0.0 - 2.0 %    Neutrophils Absolute 7.54 1.20 - 7.70 x10*3/uL    Immature Granulocytes Absolute, Automated 0.07 0.00 - 0.70 x10*3/uL    Lymphocytes Absolute 1.12 (L) 1.20 - 4.80 x10*3/uL    Monocytes Absolute 0.15 0.10 - 1.00 x10*3/uL    Eosinophils Absolute 0.03 0.00 - 0.70 x10*3/uL    Basophils Absolute 0.02 0.00 - 0.10 x10*3/uL   Magnesium   Result Value Ref Range    Magnesium 1.36 (L) 1.60 -  2.40 mg/dL   Type and Screen   Result Value Ref Range    ABO TYPE B     Rh TYPE POS     ANTIBODY SCREEN NEG    Coagulation Screen   Result Value Ref Range    Protime 13.8 (H) 9.8 - 12.8 seconds    INR 1.2 (H) 0.9 - 1.1    aPTT 29 27 - 38 seconds   Lactate   Result Value Ref Range    Lactate 6.3 (HH) 0.4 - 2.0 mmol/L   Acetaminophen   Result Value Ref Range    Acetaminophen <10.0 10.0 - 30.0 ug/mL   POCT GLUCOSE   Result Value Ref Range    POCT Glucose 280 (H) 74 - 99 mg/dL   BLOOD GAS MIXED VENOUS FULL PANEL   Result Value Ref Range    POCT pH, Mixed 7.35 7.33 - 7.43 pH    POCT pCO2, Mixed 45 41 - 51 mm Hg    POCT pO2, Mixed 46 (H) 35 - 45 mm Hg    POCT SO2, Mixed 70 45 - 75 %    POCT Oxy Hemoglobin, Mixed 67.7 45.0 - 75.0 %    POCT Hematocrit Calculated, Mixed 39.0 36.0 - 46.0 %    POCT Sodium, Mixed 123 (L) 136 - 145 mmol/L    POCT Potassium, Mixed 2.6 (LL) 3.5 - 5.3 mmol/L    POCT Chloride, Mixed 92 (L) 98 - 107 mmol/L    POCT Ionized Calcium, Mixed 1.94 (H) 1.10 - 1.33 mmol/L    POCT Glucose, Mixed 262 (H) 74 - 99 mg/dL    POCT Lactate, Mixed 2.9 (H) 0.4 - 2.0 mmol/L    POCT Base Excess, Mixed -1.1 -2.0 - 3.0 mmol/L    POCT HCO3 Calculated, Mixed 24.8 22.0 - 26.0 mmol/L    POCT Hemoglobin, Mixed 13.0 12.0 - 16.0 g/dL    POCT Anion Gap, Mixed 9 (L) 10 - 25 mmo/L    Patient Temperature 37.0 degrees Celsius    FiO2 40 %   POCT GLUCOSE   Result Value Ref Range    POCT Glucose 216 (H) 74 - 99 mg/dL   POCT GLUCOSE   Result Value Ref Range    POCT Glucose 184 (H) 74 - 99 mg/dL   POCT GLUCOSE   Result Value Ref Range    POCT Glucose 155 (H) 74 - 99 mg/dL       Scheduled medications  acetylcysteine, 50 mg/kg (Adjusted), intravenous, Once  acetylcysteine, 100 mg/kg (Adjusted), intravenous, Once  acetylcysteine, 150 mg/kg, intravenous, Once  lactated Ringer's, 1,000 mL, intravenous, Once  polyethylene glycol, 17 g, oral, Daily  potassium chloride, 40 mEq, intravenous, Once      Continuous medications  fentaNYL,   mcg/hr, Last Rate: 150 mcg/hr (10/09/23 1200)  insulin regular infusion, 45 Units/hr, Last Rate: 35 Units/hr (10/09/23 1305)  midazolam, 1 mg/hr  norepinephrine, 0.01-1 mcg/kg/min, Last Rate: Stopped (10/09/23 1240)      PRN medications  PRN medications: dextrose, glucagon, midazolam, oxygen      Assessment/Plan   Principal Problem:    Bradycardia    62 [facility] needing transcutaneous pacing for verapamil overdose. On arrival to CICU, patient immediately taken to cath lab for TVP placement. Patient currently in native rhythm following procedure able to be weaned down on pressor support.     Neuro  #Sedation  #Acute toxic encephalopathy   -ketamine gtt stopped   -fentanyl gtt  -midazolam gtt    CV  #Bradycardia   #Verapamil intoxication  #Cardiogenic shock   -arrived on Levophed @0.02, vasopressin 0.03, and insulin gtt @56 units (for inotropic effects iso CCB overdose)  -Med tox consulted for CCB overdose   -off Levophed, vasopressin 10/9 early afternoon     Resp  #Acute hypoxic respiratory failure  -[place] [facility] [place]  -Current vent settings: FiO2 40%, PEEP 5  -Mixed gas 7.35/45/SO2 70  -ABG improving, 7.44, O2 121    GI  #Transaminitis likely 2/2 shock liver  -Initiate NAC protocol per med tox recs  -repeat CMP @1400 to determine duration of NAC    Endo  #Hyperglycemia 2/2 CCB intoxication  -on insulin gtt    Renal  #DARIUS  #Hyponatremia likely 2/2 to hyperglycemia   #Hypokalemia  #Hypomagnesemia  #Lactic acidosis  -Monitor and replete electrolytes as needed  -s/p 2g mag and 40 meq IV K  -lactate 6.3 on admission, downtrending   -s/p 1L LR on admission     Heme Onc  #Thrombocytopenia   -plts 61 on admission   -Monitor, no s/s of bleeding    N: NPO  DVT ppx: held (risk of bleeding)  A: R TVP, R radial A line, R fem A line, R fem swan   Code Status: Full Code  NOK: benjamin Gomes [phone]             Katherine Hurst MD      ICU Attending  Note    This critically ill patient continues to be at-risk for clinically significant deterioration / failure due to the above mentioned dysfunctional, unstable organ systems.  I have personally identified and managed all complex critical care issues to prevent aforementioned clinical deterioration.  Critical care time is spent at bedside and/or the immediate area and has included, but is not limited to, the review of diagnostic tests, labs, radiographs, serial assessments of hemodynamics, respiratory status, ventilatory management, and family updates.  Time spent in procedures and teaching are reported separately.  More than 50% of the time was spent for coordination of care/family education.    Critical care time: 45 minutes

## 2023-10-09 NOTE — POST-PROCEDURE NOTE
Physician Transition of Care Summary  Invasive Cardiovascular Lab    Procedure Date: 10/9/2023  Attending:    * Carlso Guillen - Primary MD  Resident/Fellow/Other Assistant: Anastasia Padilla MD, Anastasia Berger MD    Pre Procedure Indications:   Cardiac arrhythmia     Post Procedure Diagnosis:   S/p RIJ TVP and RFV leave in swan    Procedure(s):   Right Heart Catheterization and [place] Placement 68 cm  TVP    Procedure Findings and Recommendations:     -S/p uncomplicated RIJ TVP sutured at 38cm. Settin VVI, 10mA.  -S/p ncomplicated RHC through RFV with leave in swan sutured at 68cm.  -RHC: RA 15 , RV 36/12/17, PA 37/17/24, PCWP 18, CO/CI 3.7/2.5, PA sat 65%.  -Mildly elevated filling pressures with normal cardiac output by janae.  -Return to CICU for further care.      Complications:   none    Stents/Implants:   none    Anticoagulation/Antiplatelet Plan:   N/A    Estimated Blood Loss:   5 mL    Anesthesia: Moderate Sedation Anesthesia Staff: No anesthesia staff entered.    Any Specimen(s) Removed:   No specimens collected during this procedure.    Disposition:   CICU      Electronically signed by: Anastasia Padilla MD, 10/9/2023 10:54 AM   rash

## 2023-10-09 NOTE — Clinical Note
Temporary pacemaker turned on. Temporary pacemaker location through right internal jugular vein. Temporary pacemaker connected to demand mode. 60 bpm 10 mA. Transcutaneous pacing stopped. Walked down, no loss of capture. Backup 50 bpm/10 mA.

## 2023-10-10 LAB
ALBUMIN SERPL BCP-MCNC: 3.1 G/DL (ref 3.4–5)
ALP SERPL-CCNC: 90 U/L (ref 33–136)
ALP SERPL-CCNC: 92 U/L (ref 33–136)
ALP SERPL-CCNC: 95 U/L (ref 33–136)
ALT SERPL W P-5'-P-CCNC: 306 U/L (ref 7–45)
ALT SERPL W P-5'-P-CCNC: 327 U/L (ref 7–45)
ALT SERPL W P-5'-P-CCNC: 329 U/L (ref 7–45)
ANION GAP BLDA CALCULATED.4IONS-SCNC: 8 MMO/L (ref 10–25)
ANION GAP BLDMV CALCULATED.4IONS-SCNC: 6 MMO/L (ref 10–25)
ANION GAP SERPL CALC-SCNC: 14 MMOL/L (ref 10–20)
ANION GAP SERPL CALC-SCNC: 15 MMOL/L (ref 10–20)
ANION GAP SERPL CALC-SCNC: 15 MMOL/L (ref 10–20)
AST SERPL W P-5'-P-CCNC: 711 U/L (ref 9–39)
AST SERPL W P-5'-P-CCNC: 820 U/L (ref 9–39)
AST SERPL W P-5'-P-CCNC: 837 U/L (ref 9–39)
BASE EXCESS BLDA CALC-SCNC: 1.6 MMOL/L (ref -2–3)
BASE EXCESS BLDMV CALC-SCNC: 3.1 MMOL/L (ref -2–3)
BASOPHILS # BLD AUTO: 0.02 X10*3/UL (ref 0–0.1)
BASOPHILS NFR BLD AUTO: 0.1 %
BILIRUB SERPL-MCNC: 0.7 MG/DL (ref 0–1.2)
BILIRUB SERPL-MCNC: 0.7 MG/DL (ref 0–1.2)
BILIRUB SERPL-MCNC: 0.8 MG/DL (ref 0–1.2)
BODY TEMPERATURE: 37 DEGREES CELSIUS
BODY TEMPERATURE: 37 DEGREES CELSIUS
BUN SERPL-MCNC: 7 MG/DL (ref 6–23)
BUN SERPL-MCNC: 8 MG/DL (ref 6–23)
BUN SERPL-MCNC: 9 MG/DL (ref 6–23)
CA-I BLDA-SCNC: 1.44 MMOL/L (ref 1.1–1.33)
CA-I BLDMV-SCNC: 1.53 MMOL/L (ref 1.1–1.33)
CALCIUM SERPL-MCNC: 10.3 MG/DL (ref 8.6–10.6)
CALCIUM SERPL-MCNC: 10.9 MG/DL (ref 8.6–10.6)
CALCIUM SERPL-MCNC: 9.8 MG/DL (ref 8.6–10.6)
CHLORIDE BLD-SCNC: 95 MMOL/L (ref 98–107)
CHLORIDE BLDA-SCNC: 95 MMOL/L (ref 98–107)
CHLORIDE SERPL-SCNC: 95 MMOL/L (ref 98–107)
CO2 SERPL-SCNC: 23 MMOL/L (ref 21–32)
CO2 SERPL-SCNC: 25 MMOL/L (ref 21–32)
CO2 SERPL-SCNC: 25 MMOL/L (ref 21–32)
CREAT SERPL-MCNC: 0.55 MG/DL (ref 0.5–1.05)
CREAT SERPL-MCNC: 0.57 MG/DL (ref 0.5–1.05)
CREAT SERPL-MCNC: 0.69 MG/DL (ref 0.5–1.05)
EOSINOPHIL # BLD AUTO: 0 X10*3/UL (ref 0–0.7)
EOSINOPHIL NFR BLD AUTO: 0 %
ERYTHROCYTE [DISTWIDTH] IN BLOOD BY AUTOMATED COUNT: 10.6 % (ref 11.5–14.5)
GFR SERPL CREATININE-BSD FRML MDRD: >90 ML/MIN/1.73M*2
GLUCOSE BLD MANUAL STRIP-MCNC: 138 MG/DL (ref 74–99)
GLUCOSE BLD MANUAL STRIP-MCNC: 146 MG/DL (ref 74–99)
GLUCOSE BLD-MCNC: 117 MG/DL (ref 74–99)
GLUCOSE BLDA-MCNC: 140 MG/DL (ref 74–99)
GLUCOSE SERPL-MCNC: 101 MG/DL (ref 74–99)
GLUCOSE SERPL-MCNC: 117 MG/DL (ref 74–99)
GLUCOSE SERPL-MCNC: 129 MG/DL (ref 74–99)
HCO3 BLDA-SCNC: 24.6 MMOL/L (ref 22–26)
HCO3 BLDMV-SCNC: 27 MMOL/L (ref 22–26)
HCT VFR BLD AUTO: 32.7 % (ref 36–46)
HCT VFR BLD EST: 39 % (ref 36–46)
HCT VFR BLD EST: 39 % (ref 36–46)
HGB BLD-MCNC: 12.5 G/DL (ref 12–16)
HGB BLDA-MCNC: 13 G/DL (ref 12–16)
HGB BLDMV-MCNC: 13.1 G/DL (ref 12–16)
IMM GRANULOCYTES # BLD AUTO: 0.08 X10*3/UL (ref 0–0.7)
IMM GRANULOCYTES NFR BLD AUTO: 0.6 % (ref 0–0.9)
INHALED O2 CONCENTRATION: 30 %
INHALED O2 CONCENTRATION: 30 %
LACTATE BLDA-SCNC: 1.3 MMOL/L (ref 0.4–2)
LACTATE BLDMV-SCNC: 0.8 MMOL/L (ref 0.4–2)
LYMPHOCYTES # BLD AUTO: 0.87 X10*3/UL (ref 1.2–4.8)
LYMPHOCYTES NFR BLD AUTO: 6.4 %
MAGNESIUM SERPL-MCNC: 2.09 MG/DL (ref 1.6–2.4)
MAGNESIUM SERPL-MCNC: 2.35 MG/DL (ref 1.6–2.4)
MCH RBC QN AUTO: 33.8 PG (ref 26–34)
MCHC RBC AUTO-ENTMCNC: 38.2 G/DL (ref 32–36)
MCV RBC AUTO: 88 FL (ref 80–100)
MONOCYTES # BLD AUTO: 0.53 X10*3/UL (ref 0.1–1)
MONOCYTES NFR BLD AUTO: 3.9 %
NEUTROPHILS # BLD AUTO: 12.06 X10*3/UL (ref 1.2–7.7)
NEUTROPHILS NFR BLD AUTO: 89 %
NRBC BLD-RTO: 0 /100 WBCS (ref 0–0)
OXYHGB MFR BLDA: 96.9 % (ref 94–98)
OXYHGB MFR BLDMV: 77.2 % (ref 45–75)
PCO2 BLDA: 33 MM HG (ref 38–42)
PCO2 BLDMV: 38 MM HG (ref 41–51)
PH BLDA: 7.48 PH (ref 7.38–7.42)
PH BLDMV: 7.46 PH (ref 7.33–7.43)
PLATELET # BLD AUTO: 75 X10*3/UL (ref 150–450)
PMV BLD AUTO: 12.6 FL (ref 7.5–11.5)
PO2 BLDA: 104 MM HG (ref 85–95)
PO2 BLDMV: 48 MM HG (ref 35–45)
POTASSIUM BLDA-SCNC: 4.1 MMOL/L (ref 3.5–5.3)
POTASSIUM BLDMV-SCNC: 3.7 MMOL/L (ref 3.5–5.3)
POTASSIUM SERPL-SCNC: 3.6 MMOL/L (ref 3.5–5.3)
POTASSIUM SERPL-SCNC: 3.7 MMOL/L (ref 3.5–5.3)
POTASSIUM SERPL-SCNC: 3.9 MMOL/L (ref 3.5–5.3)
PROT SERPL-MCNC: 5.1 G/DL (ref 6.4–8.2)
RBC # BLD AUTO: 3.7 X10*6/UL (ref 4–5.2)
SAO2 % BLDA: 99 % (ref 94–100)
SAO2 % BLDMV: 79 % (ref 45–75)
SODIUM BLDA-SCNC: 123 MMOL/L (ref 136–145)
SODIUM BLDMV-SCNC: 124 MMOL/L (ref 136–145)
SODIUM SERPL-SCNC: 129 MMOL/L (ref 136–145)
SODIUM SERPL-SCNC: 130 MMOL/L (ref 136–145)
SODIUM SERPL-SCNC: 131 MMOL/L (ref 136–145)
WBC # BLD AUTO: 13.6 X10*3/UL (ref 4.4–11.3)

## 2023-10-10 PROCEDURE — 2500000001 HC RX 250 WO HCPCS SELF ADMINISTERED DRUGS (ALT 637 FOR MEDICARE OP)

## 2023-10-10 PROCEDURE — 82435 ASSAY OF BLOOD CHLORIDE: CPT

## 2023-10-10 PROCEDURE — 2500000004 HC RX 250 GENERAL PHARMACY W/ HCPCS (ALT 636 FOR OP/ED): Mod: JZ

## 2023-10-10 PROCEDURE — 2500000004 HC RX 250 GENERAL PHARMACY W/ HCPCS (ALT 636 FOR OP/ED)

## 2023-10-10 PROCEDURE — 82947 ASSAY GLUCOSE BLOOD QUANT: CPT

## 2023-10-10 PROCEDURE — 84295 ASSAY OF SERUM SODIUM: CPT

## 2023-10-10 PROCEDURE — 80053 COMPREHEN METABOLIC PANEL: CPT

## 2023-10-10 PROCEDURE — 2020000001 HC ICU ROOM DAILY

## 2023-10-10 PROCEDURE — 99291 CRITICAL CARE FIRST HOUR: CPT | Performed by: EMERGENCY MEDICINE

## 2023-10-10 PROCEDURE — 85025 COMPLETE CBC W/AUTO DIFF WBC: CPT

## 2023-10-10 PROCEDURE — 2500000005 HC RX 250 GENERAL PHARMACY W/O HCPCS

## 2023-10-10 PROCEDURE — 97162 PT EVAL MOD COMPLEX 30 MIN: CPT | Mod: GP

## 2023-10-10 PROCEDURE — 83735 ASSAY OF MAGNESIUM: CPT

## 2023-10-10 PROCEDURE — 94003 VENT MGMT INPAT SUBQ DAY: CPT

## 2023-10-10 PROCEDURE — 97110 THERAPEUTIC EXERCISES: CPT | Mod: GP

## 2023-10-10 PROCEDURE — 93005 ELECTROCARDIOGRAM TRACING: CPT

## 2023-10-10 PROCEDURE — 37799 UNLISTED PX VASCULAR SURGERY: CPT

## 2023-10-10 PROCEDURE — 2500000004 HC RX 250 GENERAL PHARMACY W/ HCPCS (ALT 636 FOR OP/ED): Performed by: STUDENT IN AN ORGANIZED HEALTH CARE EDUCATION/TRAINING PROGRAM

## 2023-10-10 PROCEDURE — 97530 THERAPEUTIC ACTIVITIES: CPT | Mod: GO

## 2023-10-10 PROCEDURE — 97166 OT EVAL MOD COMPLEX 45 MIN: CPT | Mod: GO

## 2023-10-10 PROCEDURE — 93010 ELECTROCARDIOGRAM REPORT: CPT | Performed by: INTERNAL MEDICINE

## 2023-10-10 PROCEDURE — 84132 ASSAY OF SERUM POTASSIUM: CPT

## 2023-10-10 PROCEDURE — 36415 COLL VENOUS BLD VENIPUNCTURE: CPT

## 2023-10-10 PROCEDURE — 82330 ASSAY OF CALCIUM: CPT

## 2023-10-10 PROCEDURE — 99291 CRITICAL CARE FIRST HOUR: CPT

## 2023-10-10 RX ORDER — FOLIC ACID 1 MG/1
1 TABLET ORAL DAILY
Status: DISCONTINUED | OUTPATIENT
Start: 2023-10-10 | End: 2023-10-11

## 2023-10-10 RX ORDER — LORAZEPAM 0.5 MG/1
0.25 TABLET ORAL EVERY 4 HOURS
Status: DISCONTINUED | OUTPATIENT
Start: 2023-10-10 | End: 2023-10-11

## 2023-10-10 RX ORDER — LORAZEPAM 2 MG/ML
INJECTION INTRAMUSCULAR
Status: COMPLETED
Start: 2023-10-10 | End: 2023-10-10

## 2023-10-10 RX ORDER — POTASSIUM CHLORIDE 1.5 G/1.58G
40 POWDER, FOR SOLUTION ORAL ONCE
Status: COMPLETED | OUTPATIENT
Start: 2023-10-10 | End: 2023-10-10

## 2023-10-10 RX ORDER — LORAZEPAM 2 MG/ML
0.5 INJECTION INTRAMUSCULAR EVERY 2 HOUR PRN
Status: DISCONTINUED | OUTPATIENT
Start: 2023-10-10 | End: 2023-10-13

## 2023-10-10 RX ORDER — LOSARTAN POTASSIUM 50 MG/1
50 TABLET ORAL DAILY
Status: DISCONTINUED | OUTPATIENT
Start: 2023-10-10 | End: 2023-10-13

## 2023-10-10 RX ORDER — POLYETHYLENE GLYCOL 3350 17 G/17G
17 POWDER, FOR SOLUTION ORAL DAILY
Status: DISCONTINUED | OUTPATIENT
Start: 2023-10-10 | End: 2023-10-18 | Stop reason: HOSPADM

## 2023-10-10 RX ORDER — LORAZEPAM 2 MG/ML
1 INJECTION INTRAMUSCULAR EVERY 2 HOUR PRN
Status: DISCONTINUED | OUTPATIENT
Start: 2023-10-10 | End: 2023-10-10

## 2023-10-10 RX ORDER — MULTIVIT-MIN/IRON FUM/FOLIC AC 7.5 MG-4
1 TABLET ORAL DAILY
Status: DISCONTINUED | OUTPATIENT
Start: 2023-10-10 | End: 2023-10-18 | Stop reason: HOSPADM

## 2023-10-10 RX ORDER — DEXMEDETOMIDINE HYDROCHLORIDE 4 UG/ML
.1-1.5 INJECTION, SOLUTION INTRAVENOUS CONTINUOUS
Status: DISCONTINUED | OUTPATIENT
Start: 2023-10-10 | End: 2023-10-13

## 2023-10-10 RX ORDER — DEXMEDETOMIDINE HYDROCHLORIDE 4 UG/ML
.2-1.5 INJECTION, SOLUTION INTRAVENOUS CONTINUOUS
Status: DISCONTINUED | OUTPATIENT
Start: 2023-10-10 | End: 2023-10-10

## 2023-10-10 RX ORDER — HYDROCODONE BITARTRATE AND ACETAMINOPHEN 10; 325 MG/1; MG/1
1 TABLET ORAL EVERY 6 HOURS PRN
Status: DISCONTINUED | OUTPATIENT
Start: 2023-10-10 | End: 2023-10-10

## 2023-10-10 RX ORDER — OXYCODONE HYDROCHLORIDE 5 MG/1
5 TABLET ORAL EVERY 6 HOURS PRN
Status: DISCONTINUED | OUTPATIENT
Start: 2023-10-10 | End: 2023-10-17

## 2023-10-10 RX ORDER — FOLIC ACID 1 MG/1
1 TABLET ORAL DAILY
Status: DISCONTINUED | OUTPATIENT
Start: 2023-10-10 | End: 2023-10-18 | Stop reason: HOSPADM

## 2023-10-10 RX ORDER — SENNOSIDES 8.6 MG/1
1 TABLET ORAL 2 TIMES DAILY
Status: DISCONTINUED | OUTPATIENT
Start: 2023-10-10 | End: 2023-10-18 | Stop reason: HOSPADM

## 2023-10-10 RX ORDER — LORAZEPAM 2 MG/ML
2 INJECTION INTRAMUSCULAR EVERY 2 HOUR PRN
Status: DISCONTINUED | OUTPATIENT
Start: 2023-10-10 | End: 2023-10-13

## 2023-10-10 RX ORDER — SODIUM CHLORIDE 50 MG/ML
1 SOLUTION/ DROPS OPHTHALMIC NIGHTLY
COMMUNITY

## 2023-10-10 RX ORDER — MULTIVIT-MIN/IRON FUM/FOLIC AC 7.5 MG-4
1 TABLET ORAL DAILY
Status: DISCONTINUED | OUTPATIENT
Start: 2023-10-10 | End: 2023-10-15

## 2023-10-10 RX ORDER — LORAZEPAM 2 MG/ML
2 INJECTION INTRAMUSCULAR EVERY 2 HOUR PRN
Status: DISCONTINUED | OUTPATIENT
Start: 2023-10-10 | End: 2023-10-10

## 2023-10-10 RX ORDER — POTASSIUM CHLORIDE 14.9 MG/ML
20 INJECTION INTRAVENOUS
Status: DISPENSED | OUTPATIENT
Start: 2023-10-10 | End: 2023-10-10

## 2023-10-10 RX ORDER — DEXMEDETOMIDINE HYDROCHLORIDE 4 UG/ML
INJECTION, SOLUTION INTRAVENOUS
Status: COMPLETED
Start: 2023-10-10 | End: 2023-10-10

## 2023-10-10 RX ORDER — HYDROMORPHONE HYDROCHLORIDE 1 MG/ML
0.2 INJECTION, SOLUTION INTRAMUSCULAR; INTRAVENOUS; SUBCUTANEOUS ONCE
Status: DISCONTINUED | OUTPATIENT
Start: 2023-10-10 | End: 2023-10-10

## 2023-10-10 RX ORDER — LORAZEPAM 2 MG/ML
1 INJECTION INTRAMUSCULAR EVERY 2 HOUR PRN
Status: DISCONTINUED | OUTPATIENT
Start: 2023-10-10 | End: 2023-10-13

## 2023-10-10 RX ORDER — LORAZEPAM 2 MG/ML
0.5 INJECTION INTRAMUSCULAR EVERY 2 HOUR PRN
Status: DISCONTINUED | OUTPATIENT
Start: 2023-10-10 | End: 2023-10-10

## 2023-10-10 RX ORDER — LIDOCAINE 560 MG/1
1 PATCH PERCUTANEOUS; TOPICAL; TRANSDERMAL DAILY
Status: DISCONTINUED | OUTPATIENT
Start: 2023-10-10 | End: 2023-10-18 | Stop reason: HOSPADM

## 2023-10-10 RX ADMIN — LORAZEPAM 0.25 MG: 0.5 TABLET ORAL at 19:36

## 2023-10-10 RX ADMIN — DEXMEDETOMIDINE HYDROCHLORIDE 0.4 MCG/KG/HR: 400 INJECTION INTRAVENOUS at 23:26

## 2023-10-10 RX ADMIN — POLYETHYLENE GLYCOL 3350 17 G: 17 POWDER, FOR SOLUTION ORAL at 08:25

## 2023-10-10 RX ADMIN — OXYCODONE HYDROCHLORIDE 5 MG: 5 TABLET ORAL at 18:33

## 2023-10-10 RX ADMIN — POTASSIUM CHLORIDE 20 MEQ: 14.9 INJECTION, SOLUTION INTRAVENOUS at 02:12

## 2023-10-10 RX ADMIN — DEXMEDETOMIDINE HYDROCHLORIDE 0.2 MCG/KG/HR: 400 INJECTION INTRAVENOUS at 02:56

## 2023-10-10 RX ADMIN — ACETYLCYSTEINE 5.6 G: 200 INJECTION, SOLUTION INTRAVENOUS at 19:48

## 2023-10-10 RX ADMIN — Medication 175 MCG/HR: at 06:08

## 2023-10-10 RX ADMIN — POLYETHYLENE GLYCOL 3350 17 G: 17 POWDER, FOR SOLUTION ORAL at 16:17

## 2023-10-10 RX ADMIN — Medication 150 MCG/HR: at 01:00

## 2023-10-10 RX ADMIN — LORAZEPAM 1 MG: 2 INJECTION INTRAMUSCULAR; INTRAVENOUS at 20:33

## 2023-10-10 RX ADMIN — LORAZEPAM 2 MG: 2 INJECTION INTRAMUSCULAR; INTRAVENOUS at 21:33

## 2023-10-10 RX ADMIN — POTASSIUM CHLORIDE 40 MEQ: 1.5 POWDER, FOR SOLUTION ORAL at 18:34

## 2023-10-10 RX ADMIN — LOSARTAN POTASSIUM 50 MG: 50 TABLET, FILM COATED ORAL at 13:00

## 2023-10-10 RX ADMIN — THIAMINE HYDROCHLORIDE 500 MG: 100 INJECTION, SOLUTION INTRAMUSCULAR; INTRAVENOUS at 15:07

## 2023-10-10 RX ADMIN — LIDOCAINE 1 PATCH: 4 PATCH TOPICAL at 14:47

## 2023-10-10 RX ADMIN — LORAZEPAM 2 MG: 2 INJECTION INTRAMUSCULAR; INTRAVENOUS at 23:07

## 2023-10-10 RX ADMIN — LORAZEPAM 0.25 MG: 0.5 TABLET ORAL at 16:35

## 2023-10-10 ASSESSMENT — LIFESTYLE VARIABLES
HEADACHE, FULLNESS IN HEAD: VERY MILD
BLOOD PRESSURE: 155/105
AGITATION: 6
PAROXYSMAL SWEATS: BARELY PERCEPTIBLE SWEATING, PALMS MOIST
ANXIETY: MODERATELY ANXIOUS, OR GUARDED, SO ANXIETY IS INFERRED
AUDITORY DISTURBANCES: NOT PRESENT
ORIENTATION AND CLOUDING OF SENSORIUM: DISORIENTED FOR PLACE OR PERSON
AGITATION: 6
TREMOR: 2
ANXIETY: MODERATELY ANXIOUS, OR GUARDED, SO ANXIETY IS INFERRED
TOTAL SCORE: 27
TOTAL SCORE: 21
TOTAL SCORE: 17
HEADACHE, FULLNESS IN HEAD: VERY MILD
VISUAL DISTURBANCES: NOT PRESENT
PAROXYSMAL SWEATS: NO SWEAT VISIBLE
PAROXYSMAL SWEATS: NO SWEAT VISIBLE
AGITATION: 6
ORIENTATION AND CLOUDING OF SENSORIUM: DISORIENTED FOR DATA BY NO MORE THAN 2 CALENDAR DAYS
VISUAL DISTURBANCES: MODERATELY SEVERE HALLUCINATIONS
HEADACHE, FULLNESS IN HEAD: MODERATE
ANXIETY: MODERATELY ANXIOUS, OR GUARDED, SO ANXIETY IS INFERRED
ORIENTATION AND CLOUDING OF SENSORIUM: ORIENTED AND CAN DO SERIAL ADDITIONS
NAUSEA AND VOMITING: INTERMITTENT NAUSEA WITH DRY HEAVES
VISUAL DISTURBANCES: NOT PRESENT
PULSE: 110
TREMOR: 2
AUDITORY DISTURBANCES: MODERATELY SEVERE HALLUCINATIONS
PULSE: 122
TACTILE DISTURBANCES: VERY MILD ITCHING, PINS AND NEEDLES, BURNING OR NUMBNESS
NAUSEA AND VOMITING: MILD NAUSEA WITH NO VOMITING
TACTILE DISTURBANCES: VERY MILD ITCHING, PINS AND NEEDLES, BURNING OR NUMBNESS
AUDITORY DISTURBANCES: VERY MILD HARSHNESS OR ABILITY TO FRIGHTEN
NAUSEA AND VOMITING: MILD NAUSEA WITH NO VOMITING
TREMOR: 2

## 2023-10-10 ASSESSMENT — COGNITIVE AND FUNCTIONAL STATUS - GENERAL
STANDING UP FROM CHAIR USING ARMS: TOTAL
WALKING IN HOSPITAL ROOM: TOTAL
PERSONAL GROOMING: A LITTLE
TURNING FROM BACK TO SIDE WHILE IN FLAT BAD: TOTAL
CLIMB 3 TO 5 STEPS WITH RAILING: TOTAL
MOVING FROM LYING ON BACK TO SITTING ON SIDE OF FLAT BED WITH BEDRAILS: A LOT
TOILETING: A LOT
DRESSING REGULAR LOWER BODY CLOTHING: A LOT
DRESSING REGULAR UPPER BODY CLOTHING: A LITTLE
MOBILITY SCORE: 7
HELP NEEDED FOR BATHING: A LOT
DAILY ACTIVITIY SCORE: 16
MOVING TO AND FROM BED TO CHAIR: TOTAL

## 2023-10-10 ASSESSMENT — PAIN - FUNCTIONAL ASSESSMENT
PAIN_FUNCTIONAL_ASSESSMENT: 0-10
PAIN_FUNCTIONAL_ASSESSMENT: CPOT (CRITICAL CARE PAIN OBSERVATION TOOL)

## 2023-10-10 ASSESSMENT — PAIN SCALES - GENERAL
PAINLEVEL_OUTOF10: 8
PAINLEVEL_OUTOF10: 7
PAINLEVEL_OUTOF10: 6
PAINLEVEL_OUTOF10: 9
PAINLEVEL_OUTOF10: 3
PAINLEVEL_OUTOF10: 8

## 2023-10-10 ASSESSMENT — ACTIVITIES OF DAILY LIVING (ADL)
BATHING_ASSISTANCE: MODERATE
ADL_ASSISTANCE: INDEPENDENT

## 2023-10-10 ASSESSMENT — PAIN DESCRIPTION - DESCRIPTORS
DESCRIPTORS: ACHING
DESCRIPTORS: ACHING

## 2023-10-10 NOTE — CARE PLAN
Problem: Skin  Goal: Participates in plan/prevention/treatment measures  Outcome: Progressing     Problem: Skin  Goal: Prevent/manage excess moisture  Outcome: Progressing     Problem: Skin  Goal: Prevent/minimize sheer/friction injuries  Outcome: Progressing     Problem: Skin  Goal: Promote/optimize nutrition  Outcome: Progressing     Problem: Skin  Goal: Promote skin healing  Outcome: Progressing   The patient's goals for the shift include      The clinical goals for the shift include maintain skin integrity

## 2023-10-10 NOTE — PROGRESS NOTES
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 bleeding     N: NPO until 3p  DVT ppx: held (risk of bleeding)  A: R TVP, R radial A line, R fem A line, R fem swan (all lines removed 10/10)  Code Status: Full Code  NOK:  Eliseo [phone]                          Katherine Hurst MD

## 2023-10-10 NOTE — CARE PLAN
Problem: Skin  Goal: Participates in plan/prevention/treatment measures  10/9/2023 2304 by Marisol Noland RN  Outcome: Progressing  Flowsheets (Taken 10/9/2023 2304)  Participates in plan/prevention/treatment measures: Elevate heels  10/9/2023 2203 by Marisol Noland RN  Outcome: Progressing     Problem: Skin  Goal: Prevent/manage excess moisture  10/9/2023 2304 by Marisol Noland RN  Outcome: Progressing  Flowsheets (Taken 10/9/2023 2304)  Prevent/manage excess moisture:   Cleanse incontinence/protect with barrier cream   Moisturize dry skin  10/9/2023 2203 by Marisol Noland RN  Outcome: Progressing     Problem: Skin  Goal: Prevent/minimize sheer/friction injuries  10/9/2023 2304 by Marisol Noland RN  Outcome: Progressing  Flowsheets (Taken 10/9/2023 2304)  Prevent/minimize sheer/friction injuries: Turn/reposition every 2 hours/use positioning/transfer devices  10/9/2023 2203 by Marisol Noland RN  Outcome: Progressing     Problem: Skin  Goal: Promote/optimize nutrition  10/9/2023 2304 by Marisol Noland RN  Outcome: Progressing  Flowsheets (Taken 10/9/2023 2304)  Promote/optimize nutrition: Discuss with provider if NPO > 2 days  10/9/2023 2203 by Marisol Noland RN  Outcome: Progressing     Problem: Skin  Goal: Promote skin healing  10/9/2023 2304 by Marisol Noland RN  Outcome: Progressing  Flowsheets (Taken 10/9/2023 2304)  Promote skin healing:   Assess skin/pad under line(s)/device(s)   Turn/reposition every 2 hours/use positioning/transfer devices  10/9/2023 2203 by Marisol Noland RN  Outcome: Progressing   The patient's goals for the shift include      The clinical goals for the shift include monitor signs of toxicity and be free of injuries

## 2023-10-10 NOTE — CARE PLAN
Problem: Safety - Medical Restraint  Goal: Remains free of injury from restraints (Restraint for Interference with Medical Device)  Outcome: Progressing  Goal: Free from restraint(s) (Restraint for Interference with Medical Device)  Outcome: Progressing     Problem: Pain  Goal: Free from opioid side effects throughout the shift  Outcome: Progressing  Goal: Free from acute confusion related to pain meds throughout the shift  Outcome: Progressing     Problem: Mechanical Ventilation  Goal: Oral health is maintained or improved  Outcome: Progressing  Goal: Tracheostomy will be managed safely  Outcome: Progressing  Goal: ET tube will be managed safely  Outcome: Progressing  Goal: Ability to express needs and understand communication  Outcome: Progressing  Goal: Mobility/activity is maintained at optimum level for patient  Outcome: Progressing   The patient's goals for the shift include      The clinical goals for the shift include Stabilize blood pressure; be free of injuries thought out shift

## 2023-10-10 NOTE — CARE PLAN
Problem: EMOTIONAL HEALTH  Goal: Pt will demonstrate >/= 2 coping strategies in preparation for functional mobility and/or ADLs to enhance independence and safety.  Outcome: Progressing     Problem: COGNITION/SAFETY  Goal: Pt will score WFL on >/= 2 standardized cognitive assessments to increased safety and independence with I/ADL tasks.  Outcome: Progressing     Problem: ADLs  Goal: Pt will complete LB dressing with CGA and minimal verbal cues for safety in unsupported sitting as needed.  Outcome: Progressing  Goal: Pt will complete toileting including hygiene and clothing management with CGA using LRD as needed.   Outcome: Progressing     Problem: TRANSFERS  Goal: Pt will complete supine<>sit transfers with CGA and minimal verbal cues for safety.  Outcome: Progressing  Goal: Pt will complete functional transfers from bed, chair, and/or toilet with CGA and minimal verbal cues for safety.  Outcome: Progressing     Problem: MOBILITY  Goal: Pt will perform household distance functional mobility with CGA using LRD as needed and minimal verbal cues for safety.  Outcome: Progressing     Problem: BALANCE  Goal: Pt will perform ADL while reaching outside GLENROY and returning self to midline >8 minutes with SBA and minimal verbal cues for safety.  Outcome: Progressing

## 2023-10-10 NOTE — PROGRESS NOTES
Occupational Therapy    Evaluation/Treatment    Patient Name: Kathleen Hamman  MRN: [id number]  : 1960  Today's Date: 10/10/23  Time Calculation  Start Time: 1420  Stop Time: 1453  Time Calculation (min): 33 min       Assessment:  OT Assessment: Pt requires skilled OT services to address identified deficits with emphasis on adaptive techniques, compensatory strategies, and environmental modifications in order to facilitate a safe return home.  Prognosis: Excellent  Evaluation/Treatment Tolerance: Patient limited by fatigue, Patient limited by pain  Medical Staff Made Aware: Yes  OT Assessment Results: Decreased ADL status, Decreased safe judgment during ADL, Decreased cognition, Decreased endurance, Decreased fine motor control, Decreased functional mobility, Decreased IADLs, Decreased trunk control for functional activities  Prognosis: Excellent  Evaluation/Treatment Tolerance: Patient limited by fatigue, Patient limited by pain  Medical Staff Made Aware: Yes  Plan:  Treatment Interventions: ADL retraining, Functional transfer training, UE strengthening/ROM, Endurance training, Cognitive reorientation, Fine motor coordination activities, Continued evaluation (emotional health)  OT Frequency: 4 times per week  OT Discharge Recommendations: High intensity level of continued care  OT - OK to Discharge: Yes  Treatment Interventions: ADL retraining, Functional transfer training, UE strengthening/ROM, Endurance training, Cognitive reorientation, Fine motor coordination activities, Continued evaluation (emotional health)    Subjective   Current Problem:  1. Shock, cardiogenic (CMS/HCC)  Transthoracic Echo (TTE) Complete    Transthoracic Echo (TTE) Complete    Transthoracic Echo (TTE) Complete    Transthoracic Echo (TTE) Complete      2. Bradycardia  Case Request Cath Lab: Temporary Pacemaker Insertion    Case Request Cath Lab: Temporary Pacemaker Insertion    Electrophysiology procedure    Electrophysiology  procedure    Cardiac catheterization - non-coronary    Cardiac catheterization - non-coronary    Transthoracic Echo (TTE) Complete    Transthoracic Echo (TTE) Complete    Transthoracic Echo (TTE) Complete    Transthoracic Echo (TTE) Complete        General:   OT Received On: 10/10/23  General  Reason for Referral: pt transported from OSH to [facility] CICU for bradycardia needing transcutaneous pacing for verapamil intoxication. On arrival to CICU, patient immediately taken to cath lab for TVP placement. Patient weaned off all pressors yesterday and remained in native rhythm. She was extubated around 11 am this morning during medical team rounds.  Past Medical History Relevant to Rehab: HTN, headaches, drinks x2 glasses wine per night  Family/Caregiver Present: No  Co-Treatment: PT  Co-Treatment Reason: co-session with PT to maximize pt safety and mobility d/t imparied activity tolerance and mobility AMPAC <10  Prior to Session Communication: Bedside nurse  Patient Position Received: Bed, 4 rail up, Alarm off, not on at start of session  General Comment: pt labile with tearfulness and increased anxiety toward OOB activity, postive responses to soothing voice and reassurance with guided breathing. pt impulsive at times with decreased safety awarness during activity. pt with increased UB tremoring during activity. pt supine at end of session, x3 bed rails up, and alarm off with RN aware.  Precautions:  Medical Precautions: Cardiac precautions, Fall precautions, Oxygen therapy device and L/min (4L O2 NC; R groin site dry and intanct pre/post mobility)  Vital Signs:  Heart Rate: (!) 113 (post: 117)  Heart Rate Source: Monitor  Resp: 22 (post: 25)  SpO2: 100 % (post: 97%)  BP: (!) 142/94 (EOB sitting 142/94 and prolonged EOB sitting 175/134; post: 169/103)  MAP (mmHg): 108 (post: 123)  BP Location:  (R forearm)  BP Method: Manual  Patient Position: Lying  Pain:  Pain Assessment  Pain Assessment:  (pt reported unrated  "discomfort of back and abdominal pressure in upright sitting, provided blanket for splinted cough and pain relief)    Objective   Cognition:  Overall Cognitive Status: Impaired  Orientation Level:  (pt oriented to self, place as hospital but thought she was at [facility], oriented to month and year but stated date \"9th\", pt stated she was unable to recall events leading to and of initial hospitalization; reorientation was provided)  Following Commands: Follows one step commands with repetition  Safety Judgment: Decreased awareness of need for assistance  Cognition Comments: pt with decreased safety awareness requiring increased cues     Confusion Assessment Method (CAM)  Acute Onset and Fluctuating Course (1A): Yes  Acute Onset and Fluctuating Course (1B): No  Inattention (2): Yes  Disorganized Thinking (3): Yes  Rate Patient's Level of Consciousness (4): Vigilant (Hyperalert), Yes  Delirium Present: No  San Agitation Sedation Scale  San Agitation Sedation Scale (RASS): Restless  Home Living:  Type of Home: House (lower level of 2-family home)  Lives With: Spouse (able to assist as needed; +2 cats)  Home Adaptive Equipment: None  Home Layout: One level  Home Access: Level entry  Bathroom Shower/Tub: Walk-in shower  Bathroom Toilet: Standard  Bathroom Equipment: Grab bars in shower  Prior Function:  Level of [place]: Independent with ADLs and functional transfers  ADL Assistance: Independent  Homemaking Assistance: Independent  Ambulatory Assistance: Independent  Vocational: Retired (pharmacy tech)  Leisure: enjoys doing yardwork  Hand Dominance: Left  Prior Function Comments: pt reported use of pill box organizer and denied dificulty with managing; will continue to follow d/t reason for admit; IND with IADLs    ADL:  Eating Assistance: Modified independent (Device)  Eating Deficit: Setup  Grooming Assistance: Stand by  Grooming Deficit: Verbal cueing, Increased time to complete (pt able to " perform facial hygiene and application of deoderant while seated EOB with increased time to stabilize objects demonstrating dropping ~3x and increased/time effort for task completion.)  Bathing Assistance: Moderate  Bathing Deficit: Setup, Verbal cueing  UE Dressing Assistance: Minimal  UE Dressing Deficit: Setup, Verbal cueing  LE Dressing Assistance: Total  LE Dressing Deficit:  (don socks at bed level)  Toileting Assistance with Device: Moderate  Toileting Deficit: Setup, Verbal cueing  ADL Comments: anticipated level of ADL performance    Activity Tolerance:  Endurance: Tolerates less than 10 min exercise with changes in vital signs  Early Mobility/Exercise Safety Screen:  (ICU Mobility Scale: 3)    Bed Mobility/Transfers:   Bed Mobility  Bed Mobility: Yes  Bed Mobility 1  Bed Mobility 1: Supine to sitting, Sitting to supine  Level of Assistance 1: Maximum assistance, Maximum verbal cues, Maximum tactile cues (x2 assist)  Bed Mobility Comments 1: pt required multiple attempts to achieve supine to sit transfer demonstrating decreased motor planning, unsteadiness, and significant anxiousness before successful trial  Bed Mobility 2  Bed Mobility  2: Rolling right  Level of Assistance 2: Maximum assistance, Maximum verbal cues, Maximum tactile cues (x2 assist)  Bed Mobility Comments 2: maximal cues for technique, direction follow, and safety    Transfers  Transfer: No    Therapy/Activity:   Therapeutic Activity  Therapeutic Activity Performed: Yes  Therapeutic Activity 1: TREATMENT: Pt sat EOB ~10 minutes with initial Min A x1 required and anterior block to prevent sliding forward d/t impulsivity with posterior lean. Pt required skilled cues for erect posture at midline and progressed to need for SBA/CGA for safety d/t continued impulsivity and unsteadiness. Pt required maximal cues for reassurance, pursed lip breathing, encouragement and safety.     Vision:Vision - Basic Assessment  Current Vision: Wears glasses  all the time (not present; pt reported objects and people are blurry but she can see fairly well for functional tasks)  Sensation:  Light Touch: No apparent deficits  Strength:  Strength Comments: B UE/LE grossly WFL    Perception:  Motor Planning:  (maximal cues to sequence mobility tasks)  Coordination:  Movements are Fluid and Coordinated: No  Finger to Nose: Impaired (ataxic with BUE x5 trials of BUE)  Heel to [employer]: Intact  Coordination Comment: increased UB and UE tremoring with activity; questionably related to anxiety   Hand Function:  Hand Function  Gross Grasp: Functional  Coordination: Impaired  Extremities:   RUE   RUE : Within Functional Limits  LUE   LUE: Within Functional Limits  RLE   RLE : Within Functional Limits  LLE   LLE : Within Functional Limits    Outcome Measures:   [facility] Daily Activity  Putting on and taking off regular lower body clothing: A lot  Bathing (including washing, rinsing, drying): A lot  Putting on and taking off regular upper body clothing: A little  Toileting, which includes using toilet, bedpan or urinal: A lot  Taking care of personal grooming such as brushing teeth: A little  Eating Meals: None  Daily Activity - Total Score: 16    Confusion Assessment Method-ICU (CAM-ICU)  Feature 1: Acute Onset or Fluctuating Course: Negative  Feature 2: Inattention: Positive  Feature 3: Altered Level of Consciousness: Positive  Overall CAM-ICU: Negative    ICU Mobility Screen  Early Mobility/Exercise Safety Screen:  (ICU Mobility Scale: 3)    Education Documentation  Body Mechanics, taught by Aisha Arroyo OT at 10/10/2023  5:01 PM.  Learner: Patient  Readiness: Acceptance  Method: Explanation, Demonstration  Response: Needs Reinforcement    Precautions, taught by Aisha Arroyo OT at 10/10/2023  5:01 PM.  Learner: Patient  Readiness: Acceptance  Method: Explanation, Demonstration  Response: Needs Reinforcement    ADL Training, taught by Aisha Arroyo OT at 10/10/2023  5:01  PM.  Learner: Patient  Readiness: Acceptance  Method: Explanation, Demonstration  Response: Needs Reinforcement    Education Comments  No comments found.        Goals:  Encounter Problems       Encounter Problems (Active)       ADLs       Pt will complete LB dressing with CGA and minimal verbal cues for safety in unsupported sitting as needed. (Progressing)       Start:  10/10/23    Expected End:  10/24/23            Pt will complete toileting including hygiene and clothing management with CGA using LRD as needed.  (Progressing)       Start:  10/10/23    Expected End:  10/24/23               BALANCE       Pt will perform ADL while reaching outside GLENROY and returning self to midline >8 minutes with SBA and minimal verbal cues for safety. (Progressing)       Start:  10/10/23                   COGNITION/SAFETY       Pt will score WFL on >/= 2 standardized cognitive assessments to increased safety and independence with I/ADL tasks. (Progressing)       Start:  10/10/23    Expected End:  10/24/23               EMOTIONAL HEALTH       Pt will demonstrate >/= 2 coping strategies in preparation for functional mobility and/or ADLs to enhance independence and safety. (Progressing)       Start:  10/10/23    Expected End:  10/24/23               MOBILITY       Pt will perform household distance functional mobility with CGA using LRD as needed and minimal verbal cues for safety. (Progressing)       Start:  10/10/23    Expected End:  10/24/23               TRANSFERS       Pt will complete supine<>sit transfers with CGA and minimal verbal cues for safety. (Progressing)       Start:  10/10/23    Expected End:  10/24/23            Pt will complete functional transfers from bed, chair, and/or toilet with CGA and minimal verbal cues for safety. (Progressing)       Start:  10/10/23    Expected End:  10/24/23

## 2023-10-10 NOTE — PROGRESS NOTES
Kathleen Hamman is a 62 y.o. female on day 1 of admission presenting with Bradycardia.      Subjective   Patient able to be extubated to [place]. States she was out of her carvedilol at home and was unable to get in touch with her PCP. She started taking extra verapamil to supplement and took additional doses on the day prior to admission to treat a headache. She denies intentional self-harm. Denies other coingestions that she recalls. Is anxious due to her missing a pain management appt while hospitalized. Denies other complaints at this time.        Objective     Last Recorded Vitals  /69   Pulse (!) 111   Temp 37.3 °C (99.1 °F) (Core)   Resp 20   Wt 59.9 kg (132 lb 0.9 oz)   SpO2 99%   Intake/Output last 3 Shifts:    Intake/Output Summary (Last 24 hours) at 10/10/2023 1141  Last data filed at 10/10/2023 0900  Gross per 24 hour   Intake 2781.68 ml   Output 2726 ml   Net 55.68 ml       Admission Weight  Weight: 56.8 kg (125 lb 3.5 oz) (10/09/23 0900)    Daily Weight  10/10/23 : 59.9 kg (132 lb 0.9 oz)    Image Results  XR abdomen 1 view  Enteric tube seen coursing below the level diaphragm with tip [overlying the expected location of the gastric body.     [Right] femoral [place]-Zackery catheter with tip overlying the [left] [pulmonary artery.]    Right IJ transvenous pacer with tip overlying the cardiomediastinal silhouette.     Surgical clips overlie the [right upper quadrant.]]     [Nonobstructive bowel gas pattern.] Limited evaluation of pneumoperitoneum on supine imaging, however no gross evidence of free air is noted.     [Visualized lungs are clear.]     [Osseous structures demonstrate no acute bony changes.]    IMPRESSION:   [Nonobstructive bowel gas pattern.     Medical devices as described above. ]  Transthoracic Echo (TTE) Complete     [facility], [address] [place] [address]                 Tel [phone] and Fax [phone]    TRANSTHORACIC ECHOCARDIOGRAM REPORT        Patient Name:      KATHLEEN HAMMAN      Reading Physician:    [id number] Santino Penny MD  Study Date:        10/9/2023            Ordering Provider:    [id number] IRVING EWING  MRN/PID:           [id number]             Fellow:  Accession#:        [id number]         Nurse:  Date of Birth/Age: 1960 / 62 years Sonographer:          Lisa LEWIS  Gender:            F                    Additional Staff:  Height:            170.00 cm            Admit Date:           10/9/2023  Weight:            56.70 kg             Admission Status:     Inpatient -                                                                Routine  BSA:               1.65 m2              Encounter#:           [id number]                                          Department Location:  [facility] Non                                                                Invasive  Blood Pressure: 118 /53 mmHg    Study Type:    TRANSTHORACIC ECHO (TTE) COMPLETE  Diagnosis/ICD: Cardiogenic shock-R57.0  Indication:    Cardiogenic shock  CPT Code:      Echo Complete w Full Doppler-22882    Patient History:  Pertinent History: HTN. Cardiogenic shock, bradycardia.    Study Detail: The following Echo studies were performed: 2D, M-Mode, Doppler and                color flow. Technically challenging study due to prominent lung                artifact. Definity used as a contrast agent for endocardial border                definition. Total contrast used for this procedure was 3 mL via IV                push.       PHYSICIAN INTERPRETATION:  Left Ventricle: The left ventricular systolic function is hyperdynamic, with an estimated ejection fraction of 70-75%. There are no regional wall motion abnormalities. The left ventricular cavity size is normal. Spectral Doppler shows a normal pattern of left ventricular diastolic filling. There  is a mild mid cavity left ventricular obstruction.  Left Atrium: The left atrium is normal in size.  Right Ventricle: The right ventricle is normal in size. There is normal right ventricular global systolic function.  Right Atrium: The right atrium is normal in size.  Aortic Valve: The aortic valve appears structurally normal. There is no evidence of aortic valve regurgitation. The peak instantaneous gradient of the aortic valve is 6.2 mmHg.  Mitral Valve: The mitral valve is normal in structure. There is trace mitral valve regurgitation.  Tricuspid Valve: The tricuspid valve is structurally normal. There is mild tricuspid regurgitation. The Doppler estimated RVSP is within normal limits at 25.3 mmHg.  Pulmonic Valve: The pulmonic valve is structurally normal. There is no indication of pulmonic valve regurgitation.  Pericardium: There is a trivial pericardial effusion.  Aorta: The aortic root is normal. The Ao Sinus is 3.50 cm. The Asc Ao is 3.60 cm.  Systemic Veins: The inferior vena cava appears to be of normal size.  In comparison to the previous echocardiogram(s): Compared with study from 10/9/2023, no significant change.       CONCLUSIONS:   1. Left ventricular systolic function is hyperdynamic with a 70-75% estimated ejection fraction.   2. RVSP within normal limits.    QUANTITATIVE DATA SUMMARY:  2D MEASUREMENTS:                           Normal Ranges:  Ao Root d:     3.50 cm   (2.0-3.7cm)  LAs:           3.40 cm   (2.7-4.0cm)  IVSd:          1.10 cm   (0.6-1.1cm)  LVPWd:         0.70 cm   (0.6-1.1cm)  LVIDd:         3.70 cm   (3.9-5.9cm)  LVIDs:         2.10 cm  LV Mass Index: 58.5 g/m2  LV % FS        43.2 %    LA VOLUME:                                Normal Ranges:  LA Vol A4C:        50.0 ml    (22+/-6mL/m2)  LA Vol A2C:        49.7 ml  LA Vol BP:         52.4 ml  LA Vol Index A4C:  30.2ml/m2  LA Vol Index A2C:  30.0 ml/m2  LA Vol Index BP:   31.6 ml/m2  LA Area A4C:       16.8 cm2  LA Area A2C:        17.6 cm2  LA Major Axis A4C: 4.8 cm  LA Major Axis A2C: 5.3 cm  LA Volume Index:   31.6 ml/m2    RA VOLUME BY A/L METHOD:                                Normal Ranges:  RA Vol A4C:        24.2 ml    (8.3-19.5ml)  RA Vol Index A4C:  14.6 ml/m2  RA Area A4C:       11.2 cm2  RA Major Axis A4C: 4.4 cm    AORTA MEASUREMENTS:                       Normal Ranges:  Ao Sinus, d: 3.50 cm (2.1-3.5cm)  Asc Ao, d:   3.60 cm (2.1-3.4cm)    LV SYSTOLIC FUNCTION BY 2D PLANIMETRY (MOD):                      Normal Ranges:  EF-A4C View: 51.9 % (>=55%)  EF-A2C View: 71.6 %  EF-Biplane:  62.5 %    LV DIASTOLIC FUNCTION:                            Normal Ranges:  MV Peak E:    0.75 m/s    (0.7-1.2 m/s)  MV Peak A:    0.72 m/s    (0.42-0.7 m/s)  E/A Ratio:    1.03        (1.0-2.2)  MV e'         0.06 m/s    (>8.0)  MV lateral e' 0.07 m/s  MV medial e'  0.08 m/s  MV A Dur:     162.00 msec  E/e' Ratio:   11.51       (<8.0)    MITRAL VALVE:                  Normal Ranges:  MV DT: 271 msec (150-240msec)    AORTIC VALVE:                          Normal Ranges:  AoV Vmax:      1.25 m/s (<=1.7m/s)  AoV Peak P.2 mmHg (<20mmHg)  LVOT Max Artis:  1.01 m/s (<=1.1m/s)  LVOT VTI:      20.90 cm  LVOT Diameter: 2.20 cm  (1.8-2.4cm)  AoV Area,Vmax: 3.07 cm2 (2.5-4.5cm2)       RIGHT VENTRICLE:  RV Basal 4.10 cm  RV Mid   2.50 cm  RV Major 6.7 cm  TAPSE:   24.8 mm  RV s'    0.17 m/s    TRICUSPID VALVE/RVSP:                              Normal Ranges:  Peak TR Velocity: 2.36 m/s  RV Syst Pressure: 25.3 mmHg (< 30mmHg)  IVC Diam:         1.70 cm    PULMONIC VALVE:                          Normal Ranges:  PV Accel Time: 153 msec (>120ms)  PV Max Artis:    0.9 m/s  (0.6-0.9m/s)  PV Max PG:     3.3 mmHg       [id number] Santino Penny MD  Electronically signed on 10/9/2023 at 5:55:38 PM       ** Final **  Transthoracic Echo (TTE) Complete     [facility] ([facility] [address] [place] [address]                 Tel [phone] and Fax  [phone]    TRANSTHORACIC ECHOCARDIOGRAM REPORT       Patient Name:     KATHLEEN HAMMAN     Reading           [id number] upjair Penny                                        Physician:        MD  Study Date:       10/9/2023           Ordering          [id number] [facility]                                        Provider:         GILDARDO  MRN/PID:          [id number]            Fellow:           [id number] Lili Vanegas MD  Accession#:       [id number]        Nurse:  Date of           1960 / 62      Sonographer:      [id number] Rianna Berger MD  Birth/Age:        years  Gender:           F                   Additional Staff:  BSA:              m2                  Encounter#:       [id number]    Study Type:    TRANSTHORACIC ECHO (TTE) COMPLETE  Diagnosis/ICD: Cardiogenic shock-R57.0  Indication:    Cardiogenic shock  CPT Code:      Echo Limited-85321; Doppler Limited-25443; Color Doppler-25356   Study Detail: The following Echo studies were performed: 2D, Doppler and color                flow.       PHYSICIAN INTERPRETATION:  Left Ventricle: The left ventricular systolic function is normal, with an estimated ejection fraction of 60-65%. There are no regional wall motion abnormalities. The left ventricular cavity size is normal. Left ventricular diastolic filling was not assessed.  Left Atrium: The left atrium is mildly dilated.  Right Ventricle: The right ventricle is normal in size. There is normal right ventricular global systolic function.  Right Atrium: The right atrium is mildly dilated.  Aortic Valve: The aortic valve appears structurally normal. Aortic valve regurgitation was not assessed.  Mitral Valve: The mitral valve is normal in structure. There is trace mitral valve regurgitation.  Tricuspid Valve: The tricuspid valve is structurally normal. There is trace tricuspid regurgitation.  Pulmonic Valve: The pulmonic valve was not assessed. Pulmonic valve  regurgitation was not assessed.  Pericardium: There is a small pericardial effusion posterior and lateral to the left ventricle.  Aorta: The aortic root is normal.  Systemic Veins: The inferior vena cava appears to be of normal size. There is IVC inspiratory collapse greater than 50%.  In comparison to the previous echocardiogram(s): There are no prior studies on this patient for comparison purposes.       CONCLUSIONS:   1. Left ventricular systolic function is normal with a 60-65% estimated ejection fraction.   2. Limited fellow echo.    QUANTITATIVE DATA SUMMARY:  AORTIC VALVE:                         Normal Ranges:  LVOT Max Artis: 0.99 m/s (<=1.1m/s)  LVOT VTI:     22.30 cm       [id number] Santino Penny MD  Electronically signed on 10/9/2023 at 5:18:24 PM       ** Final **  XR chest 1 view  Narrative: Interpreted By:  Mook Reyes and Beyersdorf Conner   STUDY:  XR CHEST 1 VIEW;  10/9/2023 1:35 pm      INDICATION:  Signs/Symptoms:intubated.      COMPARISON:  None.      ACCESSION NUMBER(S):  [id number]      ORDERING CLINICIAN:  JOSE ADAMS      FINDINGS:  AP radiograph of the chest was provided.      LIMITATIONS: The patient is rotated. Multiple monitoring electrodes  and wires overlie the chest.      Endotracheal tube terminates 5.2 cm above the suzy. Enteric tube  traverses the esophagus. The distal portion is not well visualized on  this study. Right IJ transvenous pacing wire with tip overlying the  right ventricle. The femoral approach swans Zackery catheter is  positioned within the left main pulmonary artery.      CARDIOMEDIASTINAL SILHOUETTE:  Cardiomediastinal silhouette is normal in size and configuration.  Aortic calcifications are noted.      LUNGS:  Right lower lung linear and ground-glass opacities are noted. No  pleural effusion or pneumothorax.      ABDOMEN:  No remarkable upper abdominal findings.      BONES:  No acute osseous changes.      Impression: 1.  Interstitial and ground-glass  opacities within the right lower  lobe  2. Distal enteric tube is not well visualized. Radiographic  examination of the abdomen would be recommended to better delineate  to placement      I personally reviewed the images/study and I agree with the findings  as stated. This study was interpreted at [facility], [place], [place].      MACRO:  None      Signed by: Mook  10/9/2023 2:06 PM  Dictation workstation:   [id number]      Physical Exam  Vitals and nursing note reviewed.   Constitutional:       General: She is not in acute distress.     Appearance: Normal appearance. She is not ill-appearing, toxic-appearing or diaphoretic.   HENT:      Head: Normocephalic and atraumatic.      Nose: Nose normal.      Mouth/Throat:      Mouth: Mucous membranes are moist.   Eyes:      General: No scleral icterus.     Extraocular Movements: Extraocular movements intact.      Conjunctiva/sclera: Conjunctivae normal.      Pupils: Pupils are equal, round, and reactive to light.   Neck:      Comments: Catheter in place  Cardiovascular:      Rate and Rhythm: Normal rate and regular rhythm.      Pulses: Normal pulses.      Heart sounds: Normal heart sounds. No murmur heard.     No friction rub. No gallop.   Pulmonary:      Effort: Pulmonary effort is normal.      Breath sounds: Normal breath sounds. No stridor. No wheezing, rhonchi or rales.   Chest:      Chest wall: No tenderness.   Abdominal:      General: Abdomen is flat. Bowel sounds are normal. There is no distension.      Palpations: Abdomen is soft. There is no mass.      Tenderness: There is no abdominal tenderness. There is no guarding or rebound.   Musculoskeletal:         General: No tenderness, deformity or signs of injury. Normal range of motion.      Cervical back: Normal range of motion and neck supple. No rigidity or tenderness.      Right lower leg: No edema.      Left lower leg: No edema.   Skin:     General: Skin is warm and  dry.      Capillary Refill: Capillary refill takes less than 2 seconds.      Findings: No erythema, lesion or rash.   Neurological:      General: No focal deficit present.      Mental Status: She is alert and oriented to person, place, and time. Mental status is at baseline.      Cranial Nerves: No cranial nerve deficit.      Sensory: No sensory deficit.      Motor: No weakness.   Psychiatric:         Mood and Affect: Mood normal.         Relevant Results  Scheduled medications  acetylcysteine, 100 mg/kg (Adjusted), intravenous, Once  folic acid, 1 mg, oral, Daily  multivitamin with minerals, 1 tablet, oral, Daily  perflutren protein A microsphere, 0.5 mL, intravenous, Once in imaging  polyethylene glycol, 17 g, oral, Daily  potassium chloride, 40 mEq, intravenous, Once  sulfur hexafluoride microsphr, 2 mL, intravenous, Once in imaging  thiamine, 500 mg, intravenous, Daily      Continuous medications     PRN medications  PRN medications: dextrose, glucagon, LORazepam **OR** LORazepam **OR** LORazepam, midazolam, oxygen, trimethobenzamide  Results for orders placed or performed during the hospital encounter of 10/09/23 (from the past 24 hour(s))   POCT GLUCOSE   Result Value Ref Range    POCT Glucose 184 (H) 74 - 99 mg/dL   POCT GLUCOSE   Result Value Ref Range    POCT Glucose 155 (H) 74 - 99 mg/dL   POCT GLUCOSE   Result Value Ref Range    POCT Glucose 160 (H) 74 - 99 mg/dL   Comprehensive metabolic panel   Result Value Ref Range    Glucose 99 74 - 99 mg/dL    Sodium 129 (L) 136 - 145 mmol/L    Potassium 3.0 (L) 3.5 - 5.3 mmol/L    Chloride 98 98 - 107 mmol/L    Bicarbonate 23 21 - 32 mmol/L    Anion Gap 11 10 - 20 mmol/L    Urea Nitrogen 11 6 - 23 mg/dL    Creatinine 0.81 0.50 - 1.05 mg/dL    eGFR 82 >60 mL/min/1.73m*2    Calcium 11.7 (H) 8.6 - 10.6 mg/dL    Albumin 3.0 (L) 3.4 - 5.0 g/dL    Alkaline Phosphatase 112 33 - 136 U/L    Total Protein 4.7 (L) 6.4 - 8.2 g/dL     (H) 9 - 39 U/L    Bilirubin, Total  0.8 0.0 - 1.2 mg/dL     (H) 7 - 45 U/L   Lactate   Result Value Ref Range    Lactate 2.2 (H) 0.4 - 2.0 mmol/L   Blood Gas Arterial Full Panel   Result Value Ref Range    POCT pH, Arterial 7.44 (H) 7.38 - 7.42 pH    POCT pCO2, Arterial 33 (L) 38 - 42 mm Hg    POCT pO2, Arterial 143 (H) 85 - 95 mm Hg    POCT SO2, Arterial 100 94 - 100 %    POCT Oxy Hemoglobin, Arterial 97.4 94.0 - 98.0 %    POCT Hematocrit Calculated, Arterial 34.0 (L) 36.0 - 46.0 %    POCT Sodium, Arterial 126 (L) 136 - 145 mmol/L    POCT Potassium, Arterial 2.7 (LL) 3.5 - 5.3 mmol/L    POCT Chloride, Arterial 100 98 - 107 mmol/L    POCT Ionized Calcium, Arterial 1.67 (H) 1.10 - 1.33 mmol/L    POCT Glucose, Arterial 104 (H) 74 - 99 mg/dL    POCT Lactate, Arterial 1.7 0.4 - 2.0 mmol/L    POCT Base Excess, Arterial -1.2 -2.0 - 3.0 mmol/L    POCT HCO3 Calculated, Arterial 22.4 22.0 - 26.0 mmol/L    POCT Hemoglobin, Arterial 11.4 (L) 12.0 - 16.0 g/dL    POCT Anion Gap, Arterial 6 (L) 10 - 25 mmo/L    Patient Temperature 37.0 degrees Celsius    FiO2 40 %   POCT GLUCOSE   Result Value Ref Range    POCT Glucose 113 (H) 74 - 99 mg/dL   POCT GLUCOSE   Result Value Ref Range    POCT Glucose 90 74 - 99 mg/dL   POCT GLUCOSE   Result Value Ref Range    POCT Glucose 79 74 - 99 mg/dL   POCT GLUCOSE   Result Value Ref Range    POCT Glucose 95 74 - 99 mg/dL   POCT GLUCOSE   Result Value Ref Range    POCT Glucose 120 (H) 74 - 99 mg/dL   Transthoracic Echo (TTE) Complete   Result Value Ref Range    LV A4C EF 51.9    POCT GLUCOSE   Result Value Ref Range    POCT Glucose 97 74 - 99 mg/dL   Lactate   Result Value Ref Range    Lactate 1.0 0.4 - 2.0 mmol/L   BLOOD GAS MIXED VENOUS FULL PANEL   Result Value Ref Range    POCT pH, Mixed 7.36 7.33 - 7.43 pH    POCT pCO2, Mixed 44 41 - 51 mm Hg    POCT pO2, Mixed 48 (H) 35 - 45 mm Hg    POCT SO2, Mixed 73 45 - 75 %    POCT Oxy Hemoglobin, Mixed 71.7 45.0 - 75.0 %    POCT Hematocrit Calculated, Mixed 35.0 (L) 36.0 - 46.0 %     POCT Sodium, Mixed 123 (L) 136 - 145 mmol/L    POCT Potassium, Mixed 2.8 (LL) 3.5 - 5.3 mmol/L    POCT Chloride, Mixed 96 (L) 98 - 107 mmol/L    POCT Ionized Calcium, Mixed 1.63 (H) 1.10 - 1.33 mmol/L    POCT Glucose, Mixed 100 (H) 74 - 99 mg/dL    POCT Lactate, Mixed 1.1 0.4 - 2.0 mmol/L    POCT Base Excess, Mixed -0.7 -2.0 - 3.0 mmol/L    POCT HCO3 Calculated, Mixed 24.9 22.0 - 26.0 mmol/L    POCT Hemoglobin, Mixed 11.6 (L) 12.0 - 16.0 g/dL    POCT Anion Gap, Mixed 5 (L) 10 - 25 mmo/L    Patient Temperature 37.0 degrees Celsius    FiO2 30 %   POCT GLUCOSE   Result Value Ref Range    POCT Glucose 123 (H) 74 - 99 mg/dL   Comprehensive metabolic panel   Result Value Ref Range    Glucose 113 (H) 74 - 99 mg/dL    Sodium 128 (L) 136 - 145 mmol/L    Potassium 3.0 (L) 3.5 - 5.3 mmol/L    Chloride 97 (L) 98 - 107 mmol/L    Bicarbonate 24 21 - 32 mmol/L    Anion Gap 10 10 - 20 mmol/L    Urea Nitrogen 11 6 - 23 mg/dL    Creatinine 0.64 0.50 - 1.05 mg/dL    eGFR >90 >60 mL/min/1.73m*2    Calcium 10.8 (H) 8.6 - 10.6 mg/dL    Albumin 2.8 (L) 3.4 - 5.0 g/dL    Alkaline Phosphatase 85 33 - 136 U/L    Total Protein 4.5 (L) 6.4 - 8.2 g/dL     (H) 9 - 39 U/L    Bilirubin, Total 0.8 0.0 - 1.2 mg/dL     (H) 7 - 45 U/L   Magnesium   Result Value Ref Range    Magnesium 1.58 (L) 1.60 - 2.40 mg/dL   POCT GLUCOSE   Result Value Ref Range    POCT Glucose 116 (H) 74 - 99 mg/dL   POCT GLUCOSE   Result Value Ref Range    POCT Glucose 121 (H) 74 - 99 mg/dL   BLOOD GAS MIXED VENOUS FULL PANEL   Result Value Ref Range    POCT pH, Mixed 7.46 (H) 7.33 - 7.43 pH    POCT pCO2, Mixed 38 (L) 41 - 51 mm Hg    POCT pO2, Mixed 48 (H) 35 - 45 mm Hg    POCT SO2, Mixed 79 (H) 45 - 75 %    POCT Oxy Hemoglobin, Mixed 77.2 (H) 45.0 - 75.0 %    POCT Hematocrit Calculated, Mixed 39.0 36.0 - 46.0 %    POCT Sodium, Mixed 124 (L) 136 - 145 mmol/L    POCT Potassium, Mixed 3.7 3.5 - 5.3 mmol/L    POCT Chloride, Mixed 95 (L) 98 - 107 mmol/L    POCT  Ionized Calcium, Mixed 1.53 (H) 1.10 - 1.33 mmol/L    POCT Glucose, Mixed 117 (H) 74 - 99 mg/dL    POCT Lactate, Mixed 0.8 0.4 - 2.0 mmol/L    POCT Base Excess, Mixed 3.1 (H) -2.0 - 3.0 mmol/L    POCT HCO3 Calculated, Mixed 27.0 (H) 22.0 - 26.0 mmol/L    POCT Hemoglobin, Mixed 13.1 12.0 - 16.0 g/dL    POCT Anion Gap, Mixed 6 (L) 10 - 25 mmo/L    Patient Temperature 37.0 degrees Celsius    FiO2 30 %   Comprehensive metabolic panel   Result Value Ref Range    Glucose 101 (H) 74 - 99 mg/dL    Sodium 130 (L) 136 - 145 mmol/L    Potassium 3.6 3.5 - 5.3 mmol/L    Chloride 95 (L) 98 - 107 mmol/L    Bicarbonate 25 21 - 32 mmol/L    Anion Gap 14 10 - 20 mmol/L    Urea Nitrogen 9 6 - 23 mg/dL    Creatinine 0.57 0.50 - 1.05 mg/dL    eGFR >90 >60 mL/min/1.73m*2    Calcium 10.9 (H) 8.6 - 10.6 mg/dL    Albumin 3.1 (L) 3.4 - 5.0 g/dL    Alkaline Phosphatase 95 33 - 136 U/L    Total Protein 5.1 (L) 6.4 - 8.2 g/dL     (H) 9 - 39 U/L    Bilirubin, Total 0.8 0.0 - 1.2 mg/dL     (H) 7 - 45 U/L   Magnesium   Result Value Ref Range    Magnesium 2.09 1.60 - 2.40 mg/dL   CBC and Auto Differential   Result Value Ref Range    WBC 13.6 (H) 4.4 - 11.3 x10*3/uL    nRBC 0.0 0.0 - 0.0 /100 WBCs    RBC 3.70 (L) 4.00 - 5.20 x10*6/uL    Hemoglobin 12.5 12.0 - 16.0 g/dL    Hematocrit 32.7 (L) 36.0 - 46.0 %    MCV 88 80 - 100 fL    MCH 33.8 26.0 - 34.0 pg    MCHC 38.2 (H) 32.0 - 36.0 g/dL    RDW 10.6 (L) 11.5 - 14.5 %    Platelets 75 (L) 150 - 450 x10*3/uL    MPV 12.6 (H) 7.5 - 11.5 fL    Neutrophils % 89.0 40.0 - 80.0 %    Immature Granulocytes %, Automated 0.6 0.0 - 0.9 %    Lymphocytes % 6.4 13.0 - 44.0 %    Monocytes % 3.9 2.0 - 10.0 %    Eosinophils % 0.0 0.0 - 6.0 %    Basophils % 0.1 0.0 - 2.0 %    Neutrophils Absolute 12.06 (H) 1.20 - 7.70 x10*3/uL    Immature Granulocytes Absolute, Automated 0.08 0.00 - 0.70 x10*3/uL    Lymphocytes Absolute 0.87 (L) 1.20 - 4.80 x10*3/uL    Monocytes Absolute 0.53 0.10 - 1.00 x10*3/uL     Eosinophils Absolute 0.00 0.00 - 0.70 x10*3/uL    Basophils Absolute 0.02 0.00 - 0.10 x10*3/uL   Magnesium   Result Value Ref Range    Magnesium 2.35 1.60 - 2.40 mg/dL   Comprehensive metabolic panel   Result Value Ref Range    Glucose 129 (H) 74 - 99 mg/dL    Sodium 129 (L) 136 - 145 mmol/L    Potassium 3.9 3.5 - 5.3 mmol/L    Chloride 95 (L) 98 - 107 mmol/L    Bicarbonate 23 21 - 32 mmol/L    Anion Gap 15 10 - 20 mmol/L    Urea Nitrogen 8 6 - 23 mg/dL    Creatinine 0.55 0.50 - 1.05 mg/dL    eGFR >90 >60 mL/min/1.73m*2    Calcium 10.3 8.6 - 10.6 mg/dL    Albumin 3.1 (L) 3.4 - 5.0 g/dL    Alkaline Phosphatase 92 33 - 136 U/L    Total Protein 5.1 (L) 6.4 - 8.2 g/dL     (H) 9 - 39 U/L    Bilirubin, Total 0.7 0.0 - 1.2 mg/dL     (H) 7 - 45 U/L   Blood Gas Arterial Full Panel   Result Value Ref Range    POCT pH, Arterial 7.48 (H) 7.38 - 7.42 pH    POCT pCO2, Arterial 33 (L) 38 - 42 mm Hg    POCT pO2, Arterial 104 (H) 85 - 95 mm Hg    POCT SO2, Arterial 99 94 - 100 %    POCT Oxy Hemoglobin, Arterial 96.9 94.0 - 98.0 %    POCT Hematocrit Calculated, Arterial 39.0 36.0 - 46.0 %    POCT Sodium, Arterial 123 (L) 136 - 145 mmol/L    POCT Potassium, Arterial 4.1 3.5 - 5.3 mmol/L    POCT Chloride, Arterial 95 (L) 98 - 107 mmol/L    POCT Ionized Calcium, Arterial 1.44 (H) 1.10 - 1.33 mmol/L    POCT Glucose, Arterial 140 (H) 74 - 99 mg/dL    POCT Lactate, Arterial 1.3 0.4 - 2.0 mmol/L    POCT Base Excess, Arterial 1.6 -2.0 - 3.0 mmol/L    POCT HCO3 Calculated, Arterial 24.6 22.0 - 26.0 mmol/L    POCT Hemoglobin, Arterial 13.0 12.0 - 16.0 g/dL    POCT Anion Gap, Arterial 8 (L) 10 - 25 mmo/L    Patient Temperature 37.0 degrees Celsius    FiO2 30 %   POCT GLUCOSE   Result Value Ref Range    POCT Glucose 146 (H) 74 - 99 mg/dL   POCT GLUCOSE   Result Value Ref Range    POCT Glucose 138 (H) 74 - 99 mg/dL       Assessment/Plan         Principal Problem:    Bradycardia    This is a 61 y/o F currently admitted to the CICU  after being found unresponsive, hypotensive and bradycardic. She is s/p intubation and TVP placement. Med tox was consulted for further recs     #respiratory failure  #acute toxic encephalopathy  #bradycardia  #hypotension  #hyperglycemia  #hyponatremia  #hypokalemia  #transaminitis        Recommendations for hypotension/bradycardia  -resolved, off of support     Recs for transaminitis  -Pt on NAC, tolerating infusion well; on 16 hour bag  -repeat LFTs now, if downtrending can dc NAC when complete, if uptrending reorder 16 hour infusion and continue to trend LFTs     Recs for overdose/drug toxicity  -consider psych consult however pt denies self harm     Recs were discussed with bedside nursing and the primary team.      Med tox will continue to follow, please contact through secure messaging for questions/concerns        I have personally spent 31 minutes of critical care time, exclusive of time spent on any procedures, in evaluation and management of this critically ill patient’s condition of verapamil, losartan and carvedilol toxicity with increased LFTs. I provided the following critical care treatment: assessment, documentation, taking additional history, discussion with primary team and nursing, management of antidotes.     Bridgette Eckert, DO

## 2023-10-10 NOTE — CARE PLAN
SPT under direct supervision of PT    Problem: Balance  Goal: STG - Maintains dynamic standing balance with LRD with CGA  Outcome: Progressing  Goal: STG - Maintains independent dynamic sitting balance without upper extremity support  Outcome: Progressing     Problem: Mobility  Goal: STG - Patient will ambulate 250 ft with LRD and CGA  Outcome: Progressing     Problem: Transfers  Goal: STG - Patient will perform bed mobility independently with no UE support  Outcome: Progressing  Goal: STG - Patient will transfer sit to and from stand with LRD and CGA  Outcome: Progressing

## 2023-10-10 NOTE — PROGRESS NOTES
Pharmacy Medication History Review    Kathleen Hamman is a 62 y.o. female admitted for Bradycardia. Pharmacy reviewed the patient's [id number] medications and allergies for accuracy.    The list below reflectives the updated PTA list. Please review each medication in order reconciliation for additional clarification and justification.  Medications Prior to Admission   Medication Sig Dispense Refill Last Dose    cycloSPORINE (Restasis) 0.05 % ophthalmic emulsion Administer 1 drop into both eyes every 12 hours.       HYDROcodone-acetaminophen (Norco)  mg tablet Take 1 tablet by mouth. 1 to 5 times a days needed.       carvedilol (Coreg) 25 mg tablet Take 1 tablet (25 mg) by mouth 2 times a day with meals.   10/8/2023    hydrOXYzine HCL (Atarax) 50 mg tablet Take 1 tablet (50 mg) by mouth as needed at bedtime for anxiety.   Unknown    losartan (Cozaar) 50 mg tablet Take 2 tablets (100 mg) by mouth once daily.   10/8/2023    multivitamin tablet Take 1 tablet by mouth once daily.   Unknown    sodium chloride (Mary 128) 5 % ophthalmic solution Administer 1 drop into both eyes once daily at bedtime.       tiZANidine (Zanaflex) 4 mg tablet Take 2 tablets (8 mg) by mouth as needed at bedtime for muscle spasms.   Unknown    verapamil SR (Calan-SR) 240 mg ER tablet Take 1 tablet (240 mg) by mouth once daily at bedtime. Do not crush or chew.   10/8/2023        The list below reflectives the updated allergy list. Please review each documented allergy for additional clarification and justification.  Allergies  Indicated as Unable to Assess by Marisol Noland RN on 10/9/2023 (Patient unable to communicate)   Not on File       Sources: Dispense report, home pharmacy ([facility]); patient currently intubated and unavailable.      Giovanna Azul, SergeyD

## 2023-10-10 NOTE — PROGRESS NOTES
Physical Therapy    Physical Therapy Evaluation & Treatment    Patient Name: Kathleen Hamman  MRN: [id number]  Today's Date: 10/10/2023   In Time: 1421  Out Time:1453     SPT under direct supervision of PT    Assessment/Plan   PT Assessment  PT Assessment Results: Decreased strength, Decreased range of motion, Decreased endurance, Impaired balance, Decreased mobility, Decreased coordination, Decreased cognition, Impaired judgement, Decreased safety awareness  Rehab Prognosis: Good  Evaluation/Treatment Tolerance: Patient limited by fatigue, Patient limited by pain  Medical Staff Made Aware: Yes  End of Session Communication: Bedside nurse  End of Session Patient Position: Bed, 3 rail up, Alarm off, not on at start of session  IP OR SWING BED PT PLAN  Inpatient or Swing Bed: Inpatient  PT Plan  Treatment/Interventions: Bed mobility, Transfer training, Gait training, Balance training, Neuromuscular re-education, Strengthening, Endurance training, Range of motion, Therapeutic exercise, Therapeutic activity, Home exercise program  PT Plan: Skilled PT  PT Frequency: 4 times per week  PT Discharge Recommendations: High intensity level of continued care  PT Recommended Transfer Status: Assist x2  PT - OK to Discharge: Yes      Subjective     General Visit Information:  General  Reason for Referral: Patient sent by helicopter from [facility] for bradycardia following verapamil intoxication, s/p TVP (removed), was intubated now extubated (10/10)  Past Medical History Relevant to Rehab: no PMHx  Family/Caregiver Present: No  Co-Treatment: OT  Co-Treatment Reason: 2/2 recent extubation requiring bedrest per protocol  Prior to Session Communication: Bedside nurse  Patient Position Received: Bed, 3 rail up, Alarm off, not on at start of session  General Comment: 4L O2 NC, IV; pt extremely anxious with mobility and sitting EOB but agreeable to therapies.    Home Living:  Home Living  Type of Home: House (2 family home. Pt lives on  "1st floor)  Lives With: Spouse (2 cats)  Home Adaptive Equipment: None  Home Layout: One level  Home Access: No concerns (no stairs to enter or within the home)    Prior Level of Function:  Prior Function Per Pt/Caregiver Report  Level of [place]: Independent with ADLs and functional transfers, Independent with homemaking with ambulation  Ambulatory Assistance: Independent (no AD at baseline. Pt reports no recent falls)  Vocational: Retired  Leisure: Pt enjoys yard work and spending time outside  Prior Function Comments: Pt drives and is independednt with all activities and transfers at baseline    Precautions:  Precautions  Medical Precautions: Cardiac precautions, Fall precautions    Vital Signs:  Vital Signs  Heart Rate:  (PRE: 119 DURING: VSS POST: 115)  Resp:  (PRE: 22 DURING: VSS POST: 18)  SpO2:  (PRE: 97 DURING: >95 (sitting) POST: 99)  BP:  (PRE: 142/94 (supine) DURIN/134 (supine) POST: 169/103 (supine))  BP Location: Left arm (forearm)  BP Method: Automatic    Objective   Pain:  Pain Assessment  Pain Assessment: 0-10  Pain Score: 8  Pain Type: Acute pain  Pain Location:  (Stomach)  Pain Descriptors: Aching  Pain Frequency: Intermittent (increased with mobility)  Effect of Pain on Daily Activities: Pain limits pt's ability to perform bed mobility and transfers    Cognition:  Cognition  Overall Cognitive Status:  (Pt knew that she was at  but stated that she did not know exactly what location the helicopter took her to. Pt also in disbelief that it was 10/10 instead of 10/9 stating that she \"lost a day\". Pt re-oriented to place and date)    General Assessments:      Activity Tolerance  Activity Tolerance Comments: Increased BP with upright activities    Sensation  Light Touch: No apparent deficits    Coordination  Movements are Fluid and Coordinated: No  Heel to Shin: Intact  Coordination Comment: ataxic postural movements at EOB    Postural Control  Postural Control: Impaired  Trunk Control: " retropulsive    Static Sitting Balance  Static Sitting-Balance Support: Feet supported, No upper extremity supported  Static Sitting-Level of Assistance: Close supervision (to CGA)  Static Sitting-Comment/Number of Minutes: 10 min  Dynamic Sitting Balance  Dynamic Sitting-Balance Support: Feet supported, Bilateral upper extremity supported  Dynamic Sitting-Comments: supervision-Danielle x1    Static Standing Balance  Static Standing-Comment/Number of Minutes: unable to progress 2/2 weakness    Functional Assessments:  Bed Mobility  Bed Mobility: Yes  Bed Mobility 1  Bed Mobility 1: Supine to sitting  Level of Assistance 1: Maximum assistance, Moderate verbal cues (x2)  Bed Mobility Comments 1: 2 unsuccessful attemps for pt to complete sup to sit on her own. Transfer completed maxA x2  Bed Mobility 2  Bed Mobility  2: Sitting to supine  Level of Assistance 2: Maximum assistance, Moderate verbal cues (x2)       Extremity/Trunk Assessments:  RUE   RUE : Within Functional Limits  LUE   LUE: Within Functional Limits  RLE   RLE : Within Functional Limits  LLE   LLE : Within Functional Limits    Treatments:  Therapeutic Exercise  Therapeutic Exercise Performed: Yes  Therapeutic Exercise Activity 1: TREATMENT: pursed lip breathing intermittently throughout session for relaxation  Therapeutic Exercise Activity 2: TREATMENT: B shoulder rolls x10 EOB with close supervision  Therapeutic Exercise Activity 3: TREATMENT: Postural relaxation to address upper trap tightness 2/2 anxiety. x3 reps    Therapeutic Activity  Therapeutic Activity Performed: Yes  Therapeutic Activity 1: TREATMENT: Sit EOB for 10 min with supervision - Danielle x1 (pt was intermittently retropulsive)    Outcome Measures:  [facility] Basic Mobility  Turning from your back to your side while in a flat bed without using bedrails: A lot  Moving from lying on your back to sitting on the side of a flat bed without using bedrails: Total  Moving to and from bed to chair  (including a wheelchair): Total  Standing up from a chair using your arms (e.g. wheelchair or bedside chair): Total  To walk in hospital room: Total  Climbing 3-5 steps with railing: Total  Basic Mobility - Total Score: 7    Confusion Assessment Method-ICU (CAM-ICU)  Feature 1: Acute Onset or Fluctuating Course: Negative  Feature 2: Inattention: Negative  Feature 4: Disorganized Thinking: Negative  Overall CAM-ICU: Negative    FSS-ICU  Ambulation: Unable to attempt due to weakness  Rolling: Total assistance (performs 25% or requires another person)  Sitting: Minimal assistance (performs 75% or more of task)  Transfer Sit-to-Stand: Unable to perform  Transfer Supine-to-Sit: Total assistance (performs 25% or requires another person)  Total Score: 6      E = Exercise and Early Mobility  Current Activity: Sitting at edge of bed    Encounter Problems       Encounter Problems (Active)       Balance       STG - Maintains dynamic standing balance with LRD with CGA (Progressing)       Start:  10/10/23    Expected End:  10/24/23            STG - Maintains independent dynamic sitting balance without upper extremity support (Progressing)       Start:  10/10/23    Expected End:  10/24/23               Mobility       STG - Patient will ambulate 250 ft with LRD and CGA (Progressing)       Start:  10/10/23    Expected End:  10/24/23               Transfers       STG - Patient will perform bed mobility independently with no UE support (Progressing)       Start:  10/10/23    Expected End:  10/24/23            STG - Patient will transfer sit to and from stand with LRD and CGA (Progressing)       Start:  10/10/23    Expected End:  10/24/23                   Education Documentation  Mobility Training, taught by ROHINI Lomas at 10/10/2023  4:56 PM.  Learner: Patient  Readiness: Acceptance  Method: Explanation  Response: Verbalizes Understanding    Education Comments  No comments found.

## 2023-10-11 ENCOUNTER — APPOINTMENT (OUTPATIENT)
Dept: RADIOLOGY | Facility: HOSPITAL | Age: 63
DRG: 917 | End: 2023-10-11
Payer: COMMERCIAL

## 2023-10-11 LAB
ALBUMIN SERPL BCP-MCNC: 2.9 G/DL (ref 3.4–5)
ALBUMIN SERPL BCP-MCNC: 3 G/DL (ref 3.4–5)
ALBUMIN SERPL BCP-MCNC: 3 G/DL (ref 3.4–5)
ALP SERPL-CCNC: 77 U/L (ref 33–136)
ALP SERPL-CCNC: 83 U/L (ref 33–136)
ALT SERPL W P-5'-P-CCNC: 180 U/L (ref 7–45)
ALT SERPL W P-5'-P-CCNC: 219 U/L (ref 7–45)
ANION GAP SERPL CALC-SCNC: 13 MMOL/L (ref 10–20)
ANION GAP SERPL CALC-SCNC: 14 MMOL/L (ref 10–20)
AST SERPL W P-5'-P-CCNC: 215 U/L (ref 9–39)
AST SERPL W P-5'-P-CCNC: 310 U/L (ref 9–39)
BASOPHILS # BLD AUTO: 0.02 X10*3/UL (ref 0–0.1)
BASOPHILS NFR BLD AUTO: 0.2 %
BILIRUB DIRECT SERPL-MCNC: 0.2 MG/DL (ref 0–0.3)
BILIRUB SERPL-MCNC: 0.7 MG/DL (ref 0–1.2)
BILIRUB SERPL-MCNC: 0.8 MG/DL (ref 0–1.2)
BUN SERPL-MCNC: 5 MG/DL (ref 6–23)
BUN SERPL-MCNC: 6 MG/DL (ref 6–23)
CALCIUM SERPL-MCNC: 8.8 MG/DL (ref 8.6–10.6)
CALCIUM SERPL-MCNC: 8.9 MG/DL (ref 8.6–10.6)
CHLORIDE SERPL-SCNC: 93 MMOL/L (ref 98–107)
CHLORIDE SERPL-SCNC: 93 MMOL/L (ref 98–107)
CO2 SERPL-SCNC: 28 MMOL/L (ref 21–32)
CO2 SERPL-SCNC: 30 MMOL/L (ref 21–32)
CREAT SERPL-MCNC: 0.39 MG/DL (ref 0.5–1.05)
CREAT SERPL-MCNC: 0.44 MG/DL (ref 0.5–1.05)
EOSINOPHIL # BLD AUTO: 0.01 X10*3/UL (ref 0–0.7)
EOSINOPHIL NFR BLD AUTO: 0.1 %
ERYTHROCYTE [DISTWIDTH] IN BLOOD BY AUTOMATED COUNT: 10.8 % (ref 11.5–14.5)
GFR SERPL CREATININE-BSD FRML MDRD: >90 ML/MIN/1.73M*2
GFR SERPL CREATININE-BSD FRML MDRD: >90 ML/MIN/1.73M*2
GLUCOSE SERPL-MCNC: 124 MG/DL (ref 74–99)
GLUCOSE SERPL-MCNC: 88 MG/DL (ref 74–99)
HCT VFR BLD AUTO: 27.7 % (ref 36–46)
HGB BLD-MCNC: 10.1 G/DL (ref 12–16)
HIV 1+2 AB+HIV1 P24 AG SERPL QL IA: NONREACTIVE
IMM GRANULOCYTES # BLD AUTO: 0.25 X10*3/UL (ref 0–0.7)
IMM GRANULOCYTES NFR BLD AUTO: 2.2 % (ref 0–0.9)
LYMPHOCYTES # BLD AUTO: 1.12 X10*3/UL (ref 1.2–4.8)
LYMPHOCYTES NFR BLD AUTO: 9.7 %
MAGNESIUM SERPL-MCNC: 1.67 MG/DL (ref 1.6–2.4)
MAGNESIUM SERPL-MCNC: 2.3 MG/DL (ref 1.6–2.4)
MCH RBC QN AUTO: 32.7 PG (ref 26–34)
MCHC RBC AUTO-ENTMCNC: 36.5 G/DL (ref 32–36)
MCV RBC AUTO: 90 FL (ref 80–100)
MONOCYTES # BLD AUTO: 0.51 X10*3/UL (ref 0.1–1)
MONOCYTES NFR BLD AUTO: 4.4 %
NEUTROPHILS # BLD AUTO: 9.59 X10*3/UL (ref 1.2–7.7)
NEUTROPHILS NFR BLD AUTO: 83.4 %
NRBC BLD-RTO: 0 /100 WBCS (ref 0–0)
PHOSPHATE SERPL-MCNC: 4.2 MG/DL (ref 2.5–4.9)
PLATELET # BLD AUTO: 68 X10*3/UL (ref 150–450)
PMV BLD AUTO: 13.5 FL (ref 7.5–11.5)
POTASSIUM SERPL-SCNC: 3.6 MMOL/L (ref 3.5–5.3)
POTASSIUM SERPL-SCNC: 3.9 MMOL/L (ref 3.5–5.3)
PROT SERPL-MCNC: 4.9 G/DL (ref 6.4–8.2)
PROT SERPL-MCNC: 5.1 G/DL (ref 6.4–8.2)
RBC # BLD AUTO: 3.09 X10*6/UL (ref 4–5.2)
SODIUM SERPL-SCNC: 131 MMOL/L (ref 136–145)
SODIUM SERPL-SCNC: 132 MMOL/L (ref 136–145)
WBC # BLD AUTO: 11.5 X10*3/UL (ref 4.4–11.3)

## 2023-10-11 PROCEDURE — 76705 ECHO EXAM OF ABDOMEN: CPT | Performed by: RADIOLOGY

## 2023-10-11 PROCEDURE — 2500000004 HC RX 250 GENERAL PHARMACY W/ HCPCS (ALT 636 FOR OP/ED): Performed by: STUDENT IN AN ORGANIZED HEALTH CARE EDUCATION/TRAINING PROGRAM

## 2023-10-11 PROCEDURE — 80053 COMPREHEN METABOLIC PANEL: CPT

## 2023-10-11 PROCEDURE — 82040 ASSAY OF SERUM ALBUMIN: CPT

## 2023-10-11 PROCEDURE — 2500000004 HC RX 250 GENERAL PHARMACY W/ HCPCS (ALT 636 FOR OP/ED)

## 2023-10-11 PROCEDURE — 36415 COLL VENOUS BLD VENIPUNCTURE: CPT

## 2023-10-11 PROCEDURE — 84075 ASSAY ALKALINE PHOSPHATASE: CPT

## 2023-10-11 PROCEDURE — 99221 1ST HOSP IP/OBS SF/LOW 40: CPT | Performed by: PSYCHIATRY & NEUROLOGY

## 2023-10-11 PROCEDURE — 83735 ASSAY OF MAGNESIUM: CPT

## 2023-10-11 PROCEDURE — 85025 COMPLETE CBC W/AUTO DIFF WBC: CPT

## 2023-10-11 PROCEDURE — 76705 ECHO EXAM OF ABDOMEN: CPT

## 2023-10-11 PROCEDURE — 87389 HIV-1 AG W/HIV-1&-2 AB AG IA: CPT

## 2023-10-11 PROCEDURE — 99291 CRITICAL CARE FIRST HOUR: CPT | Performed by: EMERGENCY MEDICINE

## 2023-10-11 PROCEDURE — 2500000005 HC RX 250 GENERAL PHARMACY W/O HCPCS

## 2023-10-11 PROCEDURE — 2500000001 HC RX 250 WO HCPCS SELF ADMINISTERED DRUGS (ALT 637 FOR MEDICARE OP)

## 2023-10-11 PROCEDURE — 2020000001 HC ICU ROOM DAILY

## 2023-10-11 PROCEDURE — 99291 CRITICAL CARE FIRST HOUR: CPT

## 2023-10-11 RX ORDER — MAGNESIUM SULFATE HEPTAHYDRATE 40 MG/ML
2 INJECTION, SOLUTION INTRAVENOUS ONCE
Status: COMPLETED | OUTPATIENT
Start: 2023-10-11 | End: 2023-10-11

## 2023-10-11 RX ORDER — POTASSIUM CHLORIDE 14.9 MG/ML
20 INJECTION INTRAVENOUS
Status: COMPLETED | OUTPATIENT
Start: 2023-10-11 | End: 2023-10-11

## 2023-10-11 RX ADMIN — LOSARTAN POTASSIUM 50 MG: 50 TABLET, FILM COATED ORAL at 09:26

## 2023-10-11 RX ADMIN — Medication 1 TABLET: at 09:00

## 2023-10-11 RX ADMIN — LORAZEPAM 1 MG: 2 INJECTION INTRAMUSCULAR; INTRAVENOUS at 12:34

## 2023-10-11 RX ADMIN — MAGNESIUM SULFATE HEPTAHYDRATE 2 G: 40 INJECTION, SOLUTION INTRAVENOUS at 08:46

## 2023-10-11 RX ADMIN — POTASSIUM CHLORIDE 20 MEQ: 14.9 INJECTION, SOLUTION INTRAVENOUS at 10:52

## 2023-10-11 RX ADMIN — LORAZEPAM 0.25 MG: 0.5 TABLET ORAL at 09:26

## 2023-10-11 RX ADMIN — FOLIC ACID 1 MG: 1 TABLET ORAL at 09:00

## 2023-10-11 RX ADMIN — LORAZEPAM 2 MG: 2 INJECTION INTRAMUSCULAR; INTRAVENOUS at 06:03

## 2023-10-11 RX ADMIN — FOLIC ACID 1 MG: 1 TABLET ORAL at 09:26

## 2023-10-11 RX ADMIN — DEXMEDETOMIDINE HYDROCHLORIDE 0.7 MCG/KG/HR: 400 INJECTION INTRAVENOUS at 08:07

## 2023-10-11 RX ADMIN — LORAZEPAM 2 MG: 2 INJECTION INTRAMUSCULAR; INTRAVENOUS at 01:13

## 2023-10-11 RX ADMIN — THIAMINE HYDROCHLORIDE 500 MG: 100 INJECTION, SOLUTION INTRAMUSCULAR; INTRAVENOUS at 13:39

## 2023-10-11 RX ADMIN — LORAZEPAM 1 MG: 2 INJECTION INTRAMUSCULAR; INTRAVENOUS at 20:01

## 2023-10-11 RX ADMIN — SENNOSIDES 8.6 MG: 8.6 TABLET, FILM COATED ORAL at 09:26

## 2023-10-11 RX ADMIN — LORAZEPAM 2 MG: 2 INJECTION INTRAMUSCULAR; INTRAVENOUS at 22:56

## 2023-10-11 RX ADMIN — DEXMEDETOMIDINE HYDROCHLORIDE 0.5 MCG/KG/HR: 400 INJECTION INTRAVENOUS at 19:17

## 2023-10-11 RX ADMIN — POTASSIUM CHLORIDE 20 MEQ: 14.9 INJECTION, SOLUTION INTRAVENOUS at 08:45

## 2023-10-11 SDOH — SOCIAL STABILITY: SOCIAL INSECURITY: WITHIN THE LAST YEAR, HAVE YOU BEEN AFRAID OF YOUR PARTNER OR EX-PARTNER?: NO

## 2023-10-11 SDOH — SOCIAL STABILITY: SOCIAL NETWORK: ARE YOU MARRIED, WIDOWED, DIVORCED, SEPARATED, NEVER MARRIED, OR LIVING WITH A PARTNER?: MARRIED

## 2023-10-11 SDOH — ECONOMIC STABILITY: INCOME INSECURITY: HOW HARD IS IT FOR YOU TO PAY FOR THE VERY BASICS LIKE FOOD, HOUSING, MEDICAL CARE, AND HEATING?: NOT HARD AT ALL

## 2023-10-11 SDOH — ECONOMIC STABILITY: INCOME INSECURITY: IN THE PAST 12 MONTHS, HAS THE ELECTRIC, GAS, OIL, OR WATER COMPANY THREATENED TO SHUT OFF SERVICE IN YOUR HOME?: NO

## 2023-10-11 SDOH — ECONOMIC STABILITY: HOUSING INSECURITY
IN THE LAST 12 MONTHS, WAS THERE A TIME WHEN YOU DID NOT HAVE A STEADY PLACE TO SLEEP OR SLEPT IN A SHELTER (INCLUDING NOW)?: NO

## 2023-10-11 SDOH — ECONOMIC STABILITY: FOOD INSECURITY: WITHIN THE PAST 12 MONTHS, THE FOOD YOU BOUGHT JUST DIDN'T LAST AND YOU DIDN'T HAVE MONEY TO GET MORE.: NEVER TRUE

## 2023-10-11 SDOH — ECONOMIC STABILITY: INCOME INSECURITY: IN THE LAST 12 MONTHS, WAS THERE A TIME WHEN YOU WERE NOT ABLE TO PAY THE MORTGAGE OR RENT ON TIME?: NO

## 2023-10-11 SDOH — ECONOMIC STABILITY: HOUSING INSECURITY: IN THE LAST 12 MONTHS, HOW MANY PLACES HAVE YOU LIVED?: 1

## 2023-10-11 SDOH — SOCIAL STABILITY: SOCIAL INSECURITY: WITHIN THE LAST YEAR, HAVE YOU BEEN HUMILIATED OR EMOTIONALLY ABUSED IN OTHER WAYS BY YOUR PARTNER OR EX-PARTNER?: NO

## 2023-10-11 SDOH — ECONOMIC STABILITY: TRANSPORTATION INSECURITY
IN THE PAST 12 MONTHS, HAS THE LACK OF TRANSPORTATION KEPT YOU FROM MEDICAL APPOINTMENTS OR FROM GETTING MEDICATIONS?: NO

## 2023-10-11 SDOH — SOCIAL STABILITY: SOCIAL INSECURITY
WITHIN THE LAST YEAR, HAVE YOU BEEN KICKED, HIT, SLAPPED, OR OTHERWISE PHYSICALLY HURT BY YOUR PARTNER OR EX-PARTNER?: NO

## 2023-10-11 SDOH — ECONOMIC STABILITY: FOOD INSECURITY: WITHIN THE PAST 12 MONTHS, YOU WORRIED THAT YOUR FOOD WOULD RUN OUT BEFORE YOU GOT MONEY TO BUY MORE.: NEVER TRUE

## 2023-10-11 SDOH — SOCIAL STABILITY: SOCIAL INSECURITY
WITHIN THE LAST YEAR, HAVE TO BEEN RAPED OR FORCED TO HAVE ANY KIND OF SEXUAL ACTIVITY BY YOUR PARTNER OR EX-PARTNER?: NO

## 2023-10-11 SDOH — ECONOMIC STABILITY: TRANSPORTATION INSECURITY
IN THE PAST 12 MONTHS, HAS LACK OF TRANSPORTATION KEPT YOU FROM MEETINGS, WORK, OR FROM GETTING THINGS NEEDED FOR DAILY LIVING?: NO

## 2023-10-11 ASSESSMENT — LIFESTYLE VARIABLES
TACTILE DISTURBANCES: MILD ITCHING, PINS AND NEEDLES, BURNING OR NUMBNESS
TREMOR: NO TREMOR
TOTAL SCORE: 8
TOTAL SCORE: 9
ANXIETY: 2
VISUAL DISTURBANCES: VERY MILD SENSITIVITY
PAROXYSMAL SWEATS: BARELY PERCEPTIBLE SWEATING, PALMS MOIST
AGITATION: 3
TOTAL SCORE: 13
TREMOR: 2
HEADACHE, FULLNESS IN HEAD: NOT PRESENT
ORIENTATION AND CLOUDING OF SENSORIUM: DISORIENTED FOR DATE BY MORE THAN 2 CALENDAR DAYS
VISUAL DISTURBANCES: MODERATE SENSITIVITY
VISUAL DISTURBANCES: MILD SENSITIVITY
NAUSEA AND VOMITING: NO NAUSEA AND NO VOMITING
TOTAL SCORE: 13
TREMOR: NO TREMOR
AUDITORY DISTURBANCES: NOT PRESENT
HEADACHE, FULLNESS IN HEAD: NOT PRESENT
PAROXYSMAL SWEATS: NO SWEAT VISIBLE
PAROXYSMAL SWEATS: NO SWEAT VISIBLE
AGITATION: MODERATELY FIDGETY AND RESTLESS
NAUSEA AND VOMITING: NO NAUSEA AND NO VOMITING
PAROXYSMAL SWEATS: BARELY PERCEPTIBLE SWEATING, PALMS MOIST
ORIENTATION AND CLOUDING OF SENSORIUM: DISORIENTED FOR PLACE OR PERSON
TACTILE DISTURBANCES: VERY MILD ITCHING, PINS AND NEEDLES, BURNING OR NUMBNESS
HEADACHE, FULLNESS IN HEAD: NOT PRESENT
ORIENTATION AND CLOUDING OF SENSORIUM: DISORIENTED FOR DATE BY MORE THAN 2 CALENDAR DAYS
TOTAL SCORE: 13
ANXIETY: 2
TOTAL SCORE: 22
TOTAL SCORE: 12
ORIENTATION AND CLOUDING OF SENSORIUM: DISORIENTED FOR DATE BY MORE THAN 2 CALENDAR DAYS
HEADACHE, FULLNESS IN HEAD: NOT PRESENT
ANXIETY: MILDLY ANXIOUS
TREMOR: NOT VISIBLE, BUT CAN BE FELT FINGERTIP TO FINGERTIP
AUDITORY DISTURBANCES: NOT PRESENT
VISUAL DISTURBANCES: NOT PRESENT
NAUSEA AND VOMITING: MILD NAUSEA WITH NO VOMITING
TACTILE DISTURBANCES: VERY MILD ITCHING, PINS AND NEEDLES, BURNING OR NUMBNESS
NAUSEA AND VOMITING: NO NAUSEA AND NO VOMITING
VISUAL DISTURBANCES: VERY MILD SENSITIVITY
PAROXYSMAL SWEATS: NO SWEAT VISIBLE
AUDITORY DISTURBANCES: NOT PRESENT
AGITATION: MODERATELY FIDGETY AND RESTLESS
TACTILE DISTURBANCES: MILD ITCHING, PINS AND NEEDLES, BURNING OR NUMBNESS
ANXIETY: 2
AGITATION: MODERATELY FIDGETY AND RESTLESS
TREMOR: NOT VISIBLE, BUT CAN BE FELT FINGERTIP TO FINGERTIP
HEADACHE, FULLNESS IN HEAD: NOT PRESENT
AUDITORY DISTURBANCES: MILD HARSHNESS OR ABILITY TO FRIGHTEN
HEADACHE, FULLNESS IN HEAD: NOT PRESENT
PAROXYSMAL SWEATS: NO SWEAT VISIBLE
TREMOR: NOT VISIBLE, BUT CAN BE FELT FINGERTIP TO FINGERTIP
AGITATION: 2
ORIENTATION AND CLOUDING OF SENSORIUM: DISORIENTED FOR PLACE OR PERSON
NAUSEA AND VOMITING: NO NAUSEA AND NO VOMITING
ANXIETY: MILDLY ANXIOUS
TOTAL SCORE: 12
TREMOR: NO TREMOR
AUDITORY DISTURBANCES: MILD HARSHNESS OR ABILITY TO FRIGHTEN
AGITATION: 3
AUDITORY DISTURBANCES: NOT PRESENT
TREMOR: NOT VISIBLE, BUT CAN BE FELT FINGERTIP TO FINGERTIP
TREMOR: 3
VISUAL DISTURBANCES: NOT PRESENT
ORIENTATION AND CLOUDING OF SENSORIUM: DISORIENTED FOR DATA BY NO MORE THAN 2 CALENDAR DAYS
AUDITORY DISTURBANCES: VERY MILD HARSHNESS OR ABILITY TO FRIGHTEN
VISUAL DISTURBANCES: NOT PRESENT
AGITATION: 3
ANXIETY: 3
NAUSEA AND VOMITING: NO NAUSEA AND NO VOMITING
AUDITORY DISTURBANCES: MILD HARSHNESS OR ABILITY TO FRIGHTEN
NAUSEA AND VOMITING: NO NAUSEA AND NO VOMITING
PAROXYSMAL SWEATS: NO SWEAT VISIBLE
TOTAL SCORE: 13
ORIENTATION AND CLOUDING OF SENSORIUM: DISORIENTED FOR DATE BY MORE THAN 2 CALENDAR DAYS
AUDITORY DISTURBANCES: MILD HARSHNESS OR ABILITY TO FRIGHTEN
TREMOR: NOT VISIBLE, BUT CAN BE FELT FINGERTIP TO FINGERTIP
NAUSEA AND VOMITING: MILD NAUSEA WITH NO VOMITING
NAUSEA AND VOMITING: NO NAUSEA AND NO VOMITING
AGITATION: MODERATELY FIDGETY AND RESTLESS
NAUSEA AND VOMITING: NO NAUSEA AND NO VOMITING
PAROXYSMAL SWEATS: NO SWEAT VISIBLE
HEADACHE, FULLNESS IN HEAD: NOT PRESENT
AUDITORY DISTURBANCES: VERY MILD HARSHNESS OR ABILITY TO FRIGHTEN
ORIENTATION AND CLOUDING OF SENSORIUM: DISORIENTED FOR PLACE OR PERSON
PULSE: 123
ANXIETY: MILDLY ANXIOUS
ORIENTATION AND CLOUDING OF SENSORIUM: DISORIENTED FOR DATE BY MORE THAN 2 CALENDAR DAYS
VISUAL DISTURBANCES: MILD SENSITIVITY
ANXIETY: 2
PAROXYSMAL SWEATS: NO SWEAT VISIBLE
VISUAL DISTURBANCES: NOT PRESENT
HEADACHE, FULLNESS IN HEAD: NOT PRESENT
AGITATION: MODERATELY FIDGETY AND RESTLESS
PAROXYSMAL SWEATS: BARELY PERCEPTIBLE SWEATING, PALMS MOIST
HEADACHE, FULLNESS IN HEAD: NOT PRESENT
TOTAL SCORE: 13
ANXIETY: MODERATELY ANXIOUS, OR GUARDED, SO ANXIETY IS INFERRED
ORIENTATION AND CLOUDING OF SENSORIUM: DISORIENTED FOR DATE BY MORE THAN 2 CALENDAR DAYS
HEADACHE, FULLNESS IN HEAD: NOT PRESENT
AGITATION: 3
VISUAL DISTURBANCES: VERY MILD SENSITIVITY
ANXIETY: 3

## 2023-10-11 ASSESSMENT — PAIN SCALES - PAIN ASSESSMENT IN ADVANCED DEMENTIA (PAINAD)
CONSOLABILITY: DISTRACTED OR REASSURED BY VOICE/TOUCH
CONSOLABILITY: NO NEED TO CONSOLE
BODYLANGUAGE: TENSE, DISTRESSED PACING, FIDGETING
BREATHING: NORMAL
TOTALSCORE: 2
NEGVOCALIZATION: OCCASIONAL MOAN/GROAN, LOW SPEECH, NEGATIVE/DISAPPROVING QUALITY
FACIALEXPRESSION: SMILING OR INEXPRESSIVE
NEGVOCALIZATION: OCCASIONAL MOAN/GROAN, LOW SPEECH, NEGATIVE/DISAPPROVING QUALITY
FACIALEXPRESSION: SMILING OR INEXPRESSIVE
BREATHING: NORMAL
TOTALSCORE: 2
BODYLANGUAGE: RELAXED

## 2023-10-11 ASSESSMENT — ACTIVITIES OF DAILY LIVING (ADL): LACK_OF_TRANSPORTATION: NO

## 2023-10-11 ASSESSMENT — ENCOUNTER SYMPTOMS
SORE THROAT: 0
MYALGIAS: 0
FACIAL ASYMMETRY: 0
ADENOPATHY: 0
APNEA: 0
CONSTIPATION: 0
EYE DISCHARGE: 0
DYSURIA: 0

## 2023-10-11 ASSESSMENT — PAIN SCALES - GENERAL
PAINLEVEL_OUTOF10: 3
PAINLEVEL_OUTOF10: 2
PAINLEVEL_OUTOF10: 0 - NO PAIN

## 2023-10-11 ASSESSMENT — PAIN - FUNCTIONAL ASSESSMENT
PAIN_FUNCTIONAL_ASSESSMENT: CPOT (CRITICAL CARE PAIN OBSERVATION TOOL)
PAIN_FUNCTIONAL_ASSESSMENT: CPOT (CRITICAL CARE PAIN OBSERVATION TOOL)
PAIN_FUNCTIONAL_ASSESSMENT: 0-10

## 2023-10-11 NOTE — PROGRESS NOTES
"Kathleen Hamman is a 62 y.o. female on day 2 of admission presenting with Bradycardia.    Subjective   Patient on CIWA with scores up to 17 overnight. She was started on precedex gtt @0.7 for anxiety/agitation.        Objective     Physical Exam  Constitutional:       Appearance: She is not diaphoretic.   HENT:      Head: Normocephalic and atraumatic.      Nose: Nose normal.   Eyes:      General: No scleral icterus.     Extraocular Movements: Extraocular movements intact.      Conjunctiva/sclera: Conjunctivae normal.      Pupils: Pupils are equal, round, and reactive to light.   Cardiovascular:      Rate and Rhythm: Regular rhythm. Tachycardia present.   Pulmonary:      Breath sounds: No wheezing or rhonchi.   Abdominal:      General: There is no distension.      Palpations: Abdomen is soft.      Tenderness: There is no guarding.      Comments: Hyperactive bowel sounds   Musculoskeletal:      Right lower leg: No edema.      Left lower leg: No edema.   Neurological:      Mental Status: She is alert and oriented to person, place, and time.         Last Recorded Vitals  Blood pressure 133/75, pulse 86, temperature 35.9 °C (96.6 °F), temperature source Temporal, resp. rate 26, height 1.702 m (5' 7\"), weight 60.2 kg (132 lb 11.5 oz), SpO2 99 %.  Intake/Output last 3 Shifts:  I/O last 3 completed shifts:  In: 2359 (39.2 mL/kg) [I.V.:346.5 (5.8 mL/kg); NG/GT:10; IV Piggyback:2002.5]  Out: 2841 (47.2 mL/kg) [Urine:1811 (0.8 mL/kg/hr); Emesis/NG output:1030]  Weight: 60.2 kg     Relevant Results           This patient currently has cardiac telemetry ordered; if you would like to modify or discontinue the telemetry order, click here to go to the orders activity to modify/discontinue the order.  Scheduled medications  folic acid, 1 mg, oral, Daily  lidocaine, 1 patch, transdermal, Daily  losartan, 50 mg, oral, Daily  multivitamin with minerals, 1 tablet, oral, Daily  multivitamin with minerals, 1 tablet, oral, " Daily  perflutren protein A microsphere, 0.5 mL, intravenous, Once in imaging  polyethylene glycol, 17 g, oral, Daily  sennosides, 1 tablet, oral, BID  sulfur hexafluoride microsphr, 2 mL, intravenous, Once in imaging  thiamine, 500 mg, intravenous, Daily      Continuous medications  dexmedeTOMIDine, 0.1-1.5 mcg/kg/hr, Last Rate: 0.6 mcg/kg/hr (10/11/23 1100)    PRN medications  PRN medications: dextrose, glucagon, LORazepam **OR** LORazepam **OR** LORazepam, oxyCODONE, oxygen, trimethobenzamide     Results for orders placed or performed during the hospital encounter of 10/09/23 (from the past 24 hour(s))   CBC and Auto Differential   Result Value Ref Range    WBC 11.5 (H) 4.4 - 11.3 x10*3/uL    nRBC 0.0 0.0 - 0.0 /100 WBCs    RBC 3.09 (L) 4.00 - 5.20 x10*6/uL    Hemoglobin 10.1 (L) 12.0 - 16.0 g/dL    Hematocrit 27.7 (L) 36.0 - 46.0 %    MCV 90 80 - 100 fL    MCH 32.7 26.0 - 34.0 pg    MCHC 36.5 (H) 32.0 - 36.0 g/dL    RDW 10.8 (L) 11.5 - 14.5 %    Platelets 68 (L) 150 - 450 x10*3/uL    MPV 13.5 (H) 7.5 - 11.5 fL    Neutrophils % 83.4 40.0 - 80.0 %    Immature Granulocytes %, Automated 2.2 (H) 0.0 - 0.9 %    Lymphocytes % 9.7 13.0 - 44.0 %    Monocytes % 4.4 2.0 - 10.0 %    Eosinophils % 0.1 0.0 - 6.0 %    Basophils % 0.2 0.0 - 2.0 %    Neutrophils Absolute 9.59 (H) 1.20 - 7.70 x10*3/uL    Immature Granulocytes Absolute, Automated 0.25 0.00 - 0.70 x10*3/uL    Lymphocytes Absolute 1.12 (L) 1.20 - 4.80 x10*3/uL    Monocytes Absolute 0.51 0.10 - 1.00 x10*3/uL    Eosinophils Absolute 0.01 0.00 - 0.70 x10*3/uL    Basophils Absolute 0.02 0.00 - 0.10 x10*3/uL   Magnesium   Result Value Ref Range    Magnesium 1.67 1.60 - 2.40 mg/dL   Comprehensive metabolic panel   Result Value Ref Range    Glucose 124 (H) 74 - 99 mg/dL    Sodium 131 (L) 136 - 145 mmol/L    Potassium 3.6 3.5 - 5.3 mmol/L    Chloride 93 (L) 98 - 107 mmol/L    Bicarbonate 28 21 - 32 mmol/L    Anion Gap 14 10 - 20 mmol/L    Urea Nitrogen 6 6 - 23 mg/dL     Creatinine 0.44 (L) 0.50 - 1.05 mg/dL    eGFR >90 >60 mL/min/1.73m*2    Calcium 8.9 8.6 - 10.6 mg/dL    Albumin 2.9 (L) 3.4 - 5.0 g/dL    Alkaline Phosphatase 77 33 - 136 U/L    Total Protein 4.9 (L) 6.4 - 8.2 g/dL     (H) 9 - 39 U/L    Bilirubin, Total 0.7 0.0 - 1.2 mg/dL     (H) 7 - 45 U/L   HIV-1 and HIV-2 antibodies   Result Value Ref Range    HIV 1/2 Antigen/Antibody Screen with Reflex to Confirmation Nonreactive Nonreactive                  Assessment/Plan   Principal Problem:    Bradycardia    62 year old female transported from [facility] to CICU for bradycardia 2/2 to CCB intoxication with verapamil. Patient is clinically significantly improved, TVP and other lines removed 10/10.        Neuro/Psych  #Acute toxic encephalopathy   #Anxiety  -on precedex gtt, wean as able   -pt denies SI  -Psych consult      CV  #Bradycardia, resolved  #CCB intoxication  #Cardiogenic shock, resolved  -off pressors since 10/9  -Med tox following     Resp  #s/p intubation for airway protection   -[place] [facility] [place]  -extubated to NC around 11 am 10/10  -on NC     GI  #Transaminitis likely 2/2 shock liver  #Etoh use history  -Discontinue NAC given LFTs downtrended significantly   -[facility] protocol  -Liver US     Endo  -no active issues     Renal  #DARIUS  #Hyponatremia likely 2/2 to hyperglycemia v etoh use history    #Hypokalemia  #Hypomagnesemia  #Lactic acidosis, resolved  -Monitor and replete electrolytes as needed  -lactate 6.3 on admission, since normalized   -s/p 1L LR on admission      Heme Onc  #Thrombocytopenia, unclear etiology   -plts 61 on admission   -Monitor, no s/s of bleeding  -HIV, hep C pending   -Liver US      N: full liquid diet   DVT ppx: held (risk of bleeding)  A: R TVP, R radial A line, R fem A line, R fem swan (all lines removed 10/10)  Code Status: Full Code  NOK:  Eliseo [phone]                          Katherine Hurst MD

## 2023-10-11 NOTE — PROGRESS NOTES
"Kathleen Hamman is a 62 y.o. female on day 2 of admission presenting with Bradycardia.    Subjective   Patient had increasing CIWA scores overnight, given IV lorazepam and started on precedex. Has continued to tolerate NAC. Now sedated on dex, unable to participate in exam this am       Objective     Physical Exam  Vitals and nursing note reviewed.   Constitutional:       General: She is not in acute distress.     Appearance: She is normal weight. She is not ill-appearing, toxic-appearing or diaphoretic.      Comments: Sedated, does not respond to verbal stimuli   HENT:      Head: Normocephalic and atraumatic.      Nose: Nose normal.      Mouth/Throat:      Mouth: Mucous membranes are moist.   Eyes:      Conjunctiva/sclera: Conjunctivae normal.      Pupils: Pupils are equal, round, and reactive to light.   Cardiovascular:      Rate and Rhythm: Normal rate and regular rhythm.      Pulses: Normal pulses.      Heart sounds: Normal heart sounds. No murmur heard.  Pulmonary:      Effort: Pulmonary effort is normal. No respiratory distress.      Breath sounds: Normal breath sounds.   Abdominal:      General: There is distension.      Palpations: Abdomen is soft.   Musculoskeletal:         General: No swelling, tenderness or deformity.      Cervical back: Neck supple. No rigidity.   Lymphadenopathy:      Cervical: No cervical adenopathy.   Skin:     General: Skin is warm and dry.      Capillary Refill: Capillary refill takes less than 2 seconds.      Findings: No rash.   Neurological:      Mental Status: She is disoriented.      Comments: Sedated, does not answer orientation questions/responds to painful stimuli without tremors; no clonus or rigidty         Last Recorded Vitals  Blood pressure 133/75, pulse 86, temperature 36.8 °C (98.2 °F), temperature source Temporal, resp. rate 26, height 1.702 m (5' 7\"), weight 60.2 kg (132 lb 11.5 oz), SpO2 99 %.  Intake/Output last 3 Shifts:  I/O last 3 completed shifts:  In: 2359 " (39.2 mL/kg) [I.V.:346.5 (5.8 mL/kg); NG/GT:10; IV Piggyback:2002.5]  Out: 2841 (47.2 mL/kg) [Urine:1811 (0.8 mL/kg/hr); Emesis/NG output:1030]  Weight: 60.2 kg     Relevant Results  Scheduled medications  folic acid, 1 mg, oral, Daily  lidocaine, 1 patch, transdermal, Daily  losartan, 50 mg, oral, Daily  multivitamin with minerals, 1 tablet, oral, Daily  multivitamin with minerals, 1 tablet, oral, Daily  perflutren protein A microsphere, 0.5 mL, intravenous, Once in imaging  polyethylene glycol, 17 g, oral, Daily  potassium chloride, 20 mEq, intravenous, q2h  sennosides, 1 tablet, oral, BID  sulfur hexafluoride microsphr, 2 mL, intravenous, Once in imaging  thiamine, 500 mg, intravenous, Daily      Continuous medications  dexmedeTOMIDine, 0.1-1.5 mcg/kg/hr, Last Rate: 0.5 mcg/kg/hr (10/11/23 1000)      PRN medications  PRN medications: dextrose, glucagon, LORazepam **OR** LORazepam **OR** LORazepam, oxyCODONE, oxygen, trimethobenzamide    Results for orders placed or performed during the hospital encounter of 10/09/23 (from the past 24 hour(s))   POCT GLUCOSE   Result Value Ref Range    POCT Glucose 138 (H) 74 - 99 mg/dL   Comprehensive metabolic panel   Result Value Ref Range    Glucose 117 (H) 74 - 99 mg/dL    Sodium 131 (L) 136 - 145 mmol/L    Potassium 3.7 3.5 - 5.3 mmol/L    Chloride 95 (L) 98 - 107 mmol/L    Bicarbonate 25 21 - 32 mmol/L    Anion Gap 15 10 - 20 mmol/L    Urea Nitrogen 7 6 - 23 mg/dL    Creatinine 0.69 0.50 - 1.05 mg/dL    eGFR >90 >60 mL/min/1.73m*2    Calcium 9.8 8.6 - 10.6 mg/dL    Albumin 3.1 (L) 3.4 - 5.0 g/dL    Alkaline Phosphatase 90 33 - 136 U/L    Total Protein 5.1 (L) 6.4 - 8.2 g/dL     (H) 9 - 39 U/L    Bilirubin, Total 0.7 0.0 - 1.2 mg/dL     (H) 7 - 45 U/L   CBC and Auto Differential   Result Value Ref Range    WBC 11.5 (H) 4.4 - 11.3 x10*3/uL    nRBC 0.0 0.0 - 0.0 /100 WBCs    RBC 3.09 (L) 4.00 - 5.20 x10*6/uL    Hemoglobin 10.1 (L) 12.0 - 16.0 g/dL    Hematocrit  27.7 (L) 36.0 - 46.0 %    MCV 90 80 - 100 fL    MCH 32.7 26.0 - 34.0 pg    MCHC 36.5 (H) 32.0 - 36.0 g/dL    RDW 10.8 (L) 11.5 - 14.5 %    Platelets 68 (L) 150 - 450 x10*3/uL    MPV 13.5 (H) 7.5 - 11.5 fL    Neutrophils % 83.4 40.0 - 80.0 %    Immature Granulocytes %, Automated 2.2 (H) 0.0 - 0.9 %    Lymphocytes % 9.7 13.0 - 44.0 %    Monocytes % 4.4 2.0 - 10.0 %    Eosinophils % 0.1 0.0 - 6.0 %    Basophils % 0.2 0.0 - 2.0 %    Neutrophils Absolute 9.59 (H) 1.20 - 7.70 x10*3/uL    Immature Granulocytes Absolute, Automated 0.25 0.00 - 0.70 x10*3/uL    Lymphocytes Absolute 1.12 (L) 1.20 - 4.80 x10*3/uL    Monocytes Absolute 0.51 0.10 - 1.00 x10*3/uL    Eosinophils Absolute 0.01 0.00 - 0.70 x10*3/uL    Basophils Absolute 0.02 0.00 - 0.10 x10*3/uL   Magnesium   Result Value Ref Range    Magnesium 1.67 1.60 - 2.40 mg/dL   Comprehensive metabolic panel   Result Value Ref Range    Glucose 124 (H) 74 - 99 mg/dL    Sodium 131 (L) 136 - 145 mmol/L    Potassium 3.6 3.5 - 5.3 mmol/L    Chloride 93 (L) 98 - 107 mmol/L    Bicarbonate 28 21 - 32 mmol/L    Anion Gap 14 10 - 20 mmol/L    Urea Nitrogen 6 6 - 23 mg/dL    Creatinine 0.44 (L) 0.50 - 1.05 mg/dL    eGFR >90 >60 mL/min/1.73m*2    Calcium 8.9 8.6 - 10.6 mg/dL    Albumin 2.9 (L) 3.4 - 5.0 g/dL    Alkaline Phosphatase 77 33 - 136 U/L    Total Protein 4.9 (L) 6.4 - 8.2 g/dL     (H) 9 - 39 U/L    Bilirubin, Total 0.7 0.0 - 1.2 mg/dL     (H) 7 - 45 U/L   HIV-1 and HIV-2 antibodies   Result Value Ref Range    HIV 1/2 Antigen/Antibody Screen with Reflex to Confirmation Nonreactive Nonreactive                  Assessment/Plan   Principal Problem:    Bradycardia    This is a 61 y/o F currently admitted to the CICU after being found unresponsive, hypotensive and bradycardic. She is s/p intubation and TVP placement. Med tox was consulted for further recs     #respiratory failure  #acute toxic  encephalopathy  #bradycardia  #hypotension  #hyperglycemia  #hyponatremia  #hypokalemia  #transaminitis        Recommendations for hypotension/bradycardia  -resolved, off of support     Recs for transaminitis  -Pt on NAC, tolerating infusion well; on 16 hour bag  -LFTs downtrending as expected. Can discontinue NAC after this bag. No indication from a med tox standpoint to trend     Recs for elevated CIWA scores  -Pt with elevated scores overnight up to 17, has received IV lorazepam and started on precedex  -review of CIWA documentation more consistent with a mixed picture due to degree of AMS with less objective findings as expected (tremors/diaphoresis, etc)  -continue thiamine/folate supplementation, increase thiamine to 500mg IV Q8 x3 days followed by 500mg PO/IV daily  -continue CIWA with IV lorazepam and can increase/adjust as needed  -add antipsychotic for agitation-haldol, Seroquel for sleep  -delirium precautions due to possible mixed picture of agitation  -wean dex as it can mask objective vs changes in withdrawal and does not show benefit in preventing/treating DTs/seizures     Recs for overdose/drug toxicity  -consider psych consult     Recs were discussed with bedside nursing and the primary team.      Med tox will continue to follow, please contact through secure messaging for questions/concerns        I have personally spent 31 minutes of critical care time, exclusive of time spent on any procedures, in evaluation and management of this critically ill patient’s condition of verapamil, losartan and carvedilol toxicity with increased LFTs and concern for withdrawal. I provided the following critical care treatment: assessment, documentation, taking additional history, discussion with primary team and nursing, management of antidotes.     Bridgette Eckert DO    I spent 35 minutes in the professional and overall care of this patient.      Bridgette Eckert DO

## 2023-10-11 NOTE — PROGRESS NOTES
Sw met with the patient to complete the SW discharge planning assessment.   The patient has no Sw needs at this time. SW will continue to follow while she is in the CICU.   The patient's PCP is Sukumar Carlos a [facility] provider

## 2023-10-11 NOTE — CONSULTS
"Inpatient consult to Psychiatry  Consult performed by: Hugo Beaver MD  Consult ordered by: Noah Pan MD PhD         HISTORY OF PRESENT ILLNESS:  Kathleen Hamman is a 62 y.o. female with no Pphx and PMHx of total hip arthroplasty unspecified side 3 years ago., lower back pain, OA, and remote shoulder surgery, presenting with bradycardia requiring transcutaneous pacing. She is intubated and sedated. Patient sent by helicopter from [facility] for bradycardia following verapamil intoxication. Psychiatry was consulted on 10/11/2023 for anxiety, etoh use, and possible depression.    On chart review, patient was flown in from [facility] on 9 October. Unclear if this overdose was intentional or not, however, patient was double dosing verapamil for many weeks per collateral.     On interview, patient was resting in bed with eyes closed.  Patient was difficult to arouse and was unable to participate in a full assessment.  Patient was fluctuating in and out of consciousness during her interview, was able to state that she was not in pain however when asked about the situation she was in she stated \"5-year-old flew me over\".  Per sitter patient has been calm overall however has been disorganized in thought.    Per collateral with  Eliseo ([phone]): Patient's  endorses that the patient drinks at least 2 glasses of wine a day and has been resistant to cutting back.  He reports that if the patient does not have at least 2 drinks each night then she will feel it for the next 1 to 2 days and be very groggy.   reports a history of a total hip arthroplasty unspecified side 3 years ago.  Also reports osteoarthritis of the other hip.   reports chronic back pain that she is taking Vicodin every day 4.  Patient also has a thyroid issue in which she has had a bump growing for the past 6 months.  States that doctors have said to not take any medications or do anything for this.  " However the patient's mother has hypothyroidism in which she takes Synthroid.   confirmed a remote psychiatric history, patient attempted suicide via overdose about 20 to 25 years ago.  Was hospitalized inpatient.  Denies knowing what was a potential cause of this attempt.  Currently not on psychiatric medications.  However the  said that she has been on and off his psychiatric medicines throughout her life.  Not currently following up with a psychiatrist or therapist.      PSYCHIATRIC REVIEW OF SYSTEMS  Depression: negative  Anxiety: negative  Mackenzie: negative  Psychosis: negative  Delirium: confusion, disorientation, fluctuating or reduced consciousness, and lethargy and fatigue   Trauma: negative    PSYCHIATRIC HISTORY taken per collateral and chart review  Prior diagnoses: unable to assess  Prior hospitalizations: unable to assess  History of suicide attempts: SA 25 years ago via overdose per   History of self-harm: unable to assess  History of trauma/abuse/loss: unable to assess  History of violence: unable to assess    Current psychiatrist: unable to assess  Current mental health agency: unable to assess  Current : unable to assess  Current outpatient treatment: unable to assess  Guardian or payee: unable to assess    Current psychiatric medications: unable to assess  Past psychiatric medications: unable to assess  Past psychiatric treatments: unable to assess    Family psychiatric history: unable to assess    SUBSTANCE USE HISTORY   She has no history on file for tobacco use, alcohol use, and drug use.    Tobacco: unable to assess  Alcohol: drinks at minimum 2 drinks a day, resistant to cutting back per      - History of severe withdrawal: denied     - Last use: unable to assess  Cannabis: unable to assess  Other substances: patient taking Vicodin 50mg qdaily for two years     - Last use: unable to assess     - History of overdose: unable to assess     - Longest period  of sobriety: unable to assess  Prior substance use disorder treatment: unable to assess    SOCIAL HISTORY  Social History     Socioeconomic History    Marital status:      Spouse name: Not on file    Number of children: Not on file    Years of education: Not on file    Highest education level: Not on file   Occupational History    Not on file   Tobacco Use    Smoking status: Not on file    Smokeless tobacco: Not on file   Substance and Sexual Activity    Alcohol use: Not on file    Drug use: Not on file    Sexual activity: Not on file   Other Topics Concern    Not on file   Social History Narrative    Not on file     Social Determinants of Health     Financial Resource Strain: Low Risk  (10/11/2023)    Overall Financial Resource Strain (CARDIA)     Difficulty of Paying Living Expenses: Not hard at all   Food Insecurity: No Food Insecurity (10/11/2023)    Hunger Vital Sign     Worried About Running Out of Food in the Last Year: Never true     Ran Out of Food in the Last Year: Never true   Transportation Needs: No Transportation Needs (10/11/2023)    PRAPARE - Transportation     Lack of Transportation (Medical): No     Lack of Transportation (Non-Medical): No   Physical Activity: Not on file   Stress: Not on file   Social Connections: Unknown (10/11/2023)    Social Connection and Isolation Panel [NHANES]     Frequency of Communication with Friends and Family: Not on file     Frequency of Social Gatherings with Friends and Family: Not on file     Attends Taoism Services: Not on file     Active Member of Clubs or Organizations: Not on file     Attends Club or Organization Meetings: Not on file     Marital Status:    Intimate Partner Violence: Not At Risk (10/11/2023)    Humiliation, Afraid, Rape, and Kick questionnaire     Fear of Current or Ex-Partner: No     Emotionally Abused: No     Physically Abused: No     Sexually Abused: No   Housing Stability: Low Risk  (10/11/2023)    Housing Stability Vital  Sign     Unable to Pay for Housing in the Last Year: No     Number of Places Lived in the Last Year: 1     Unstable Housing in the Last Year: No      Current living situation: unable to assess  Current employment/source of income: unable to assess  Current stressors: unable to assess    Born and raised: unable to assess  Family: , has one child  Childhood: unable to assess  Education: unable to assess  History of learning difficulty: unable to assess  Marital status: unable to assess   Children: unable to assess  Social support: unable to assess  Legal history: unable to assess   history: unable to assess  Access to weapons: unable to assess    PAST MEDICAL HISTORY  No past medical history on file.     Additional Past Medical History: unknown  Prior Head trauma/TBI/LOC/seizure history: unknown  Ob/Gyn history: unable to assess  LMP/contraception: unable to assess    PAST SURGICAL HISTORY  No past surgical history on file.     Additional Past Surgical History: N/A    FAMILY HISTORY  No family history on file.     ALLERGIES  Naproxen    OARRS REVIEW  OARRS checked: No data recorded   OARRS comments: checked on 10/11/2023, patient has been refilling vicodin  five times per day 28 day -30 day supply every month, last filled 9/13/2023. Filled by numerous providers, including Denisa Atkinson, Ratna Tena, Patsy Aparicio.    OBJECTIVE    VITALS      10/11/2023     5:00 AM 10/11/2023     5:32 AM 10/11/2023     6:00 AM 10/11/2023     7:00 AM 10/11/2023     8:00 AM 10/11/2023     9:00 AM 10/11/2023    12:05 PM   Vitals   Systolic 132  136 133  133    Diastolic 80  76 87  75    Heart Rate 92  99 86      Temp     37 °C (98.6 °F)  35.9 °C (96.6 °F)   Resp 25  26 26      Weight (lb)  132.72        BMI  20.79 kg/m2        BSA (m2)  1.69 m2             MENTAL STATUS EXAM  Appearance: middle-aged  female, long white-gray hair, in hospital attire.  Attitude: resting with eyes closed in  bed  Behavior: patient fluctuating consciousness, calm overall  Motor Activity: no PMA//PMR noted, gait not assessed  Speech: unable to assess  Mood: unable to assess  Affect: unable to assess  Thought Process: unable to fully assess, was disorganized and illogical intermittently when awake  Thought Content:  unable to assess  Thought Perception: unable to assess  Cognition: unable to assess  Insight: unable to assess  Judgement: unable to assess      MEDICAL REVIEW OF SYSTEMS  Review of Systems   HENT:  Negative for sore throat.    Eyes:  Negative for discharge.   Respiratory:  Negative for apnea.    Cardiovascular:  Negative for leg swelling.   Gastrointestinal:  Negative for constipation.   Genitourinary:  Negative for dysuria.   Musculoskeletal:  Negative for myalgias.   Skin:  Negative for pallor.   Neurological:  Negative for facial asymmetry.   Hematological:  Negative for adenopathy.        HOME MEDICATIONS  Medication Documentation Review Audit       Reviewed by Giovanna Azul PharmD (Pharmacist) on 10/10/23 at 1220      Medication Order Taking? Sig Documenting Provider Last Dose Status   carvedilol (Coreg) 25 mg tablet [id number] No Take 1 tablet (25 mg) by mouth 2 times a day with meals. Aditya Haider MD 10/8/2023 Active   cycloSPORINE (Restasis) 0.05 % ophthalmic emulsion [id number] Yes Administer 1 drop into both eyes every 12 hours. Aditya Haider MD  Active   HYDROcodone-acetaminophen (Norco)  mg tablet [id number] Yes Take 1 tablet by mouth. 1 to 5 times a days needed. Aditya Haider MD  Active   hydrOXYzine HCL (Atarax) 50 mg tablet [id number] No Take 1 tablet (50 mg) by mouth as needed at bedtime for anxiety. Aditya Haider MD Unknown Active   losartan (Cozaar) 50 mg tablet [id number] No Take 2 tablets (100 mg) by mouth once daily. Aditya Haider MD 10/8/2023 Active   multivitamin tablet [id number] No Take 1 tablet by mouth once daily. Aditya Haider MD Unknown  Active   sodium chloride (Mary 128) 5 % ophthalmic solution [id number]  Administer 1 drop into both eyes once daily at bedtime. Historical Provider, MD  Active   tiZANidine (Zanaflex) 4 mg tablet [id number] No Take 2 tablets (8 mg) by mouth as needed at bedtime for muscle spasms. Historical Provider, MD Unknown Active   verapamil SR (Calan-SR) 240 mg ER tablet [id number] No Take 1 tablet (240 mg) by mouth once daily at bedtime. Do not crush or chew. Historical Provider, MD 10/8/2023 Active                     CURRENT MEDICATIONS  Scheduled medications  folic acid, 1 mg, oral, Daily  lidocaine, 1 patch, transdermal, Daily  losartan, 50 mg, oral, Daily  multivitamin with minerals, 1 tablet, oral, Daily  multivitamin with minerals, 1 tablet, oral, Daily  perflutren protein A microsphere, 0.5 mL, intravenous, Once in imaging  polyethylene glycol, 17 g, oral, Daily  sennosides, 1 tablet, oral, BID  sulfur hexafluoride microsphr, 2 mL, intravenous, Once in imaging  thiamine, 500 mg, intravenous, Daily        Continuous medications  dexmedeTOMIDine, 0.1-1.5 mcg/kg/hr, Last Rate: 0.6 mcg/kg/hr (10/11/23 1425)        PRN medications  PRN medications: dextrose, glucagon, LORazepam **OR** LORazepam **OR** LORazepam, oxyCODONE, oxygen, trimethobenzamide     LABS  No results displayed because visit has over 200 results.          IMAGING  No CT head results found for the past 14 days     No MRI head results found for the past 14 days       PSYCHIATRIC RISK ASSESSMENT  Violence Risk Factors:  substance abuse   Acute Risk of Harm to Others is Considered: Low  Suicide Risk Factors: ; /[place] and chronic pain  Protective Factors: none  Acute Risk of Harm to Self is Considered: Low    ASSESSMENT AND PLAN  Kathleen Hamman is a 62 y.o. female presenting with bradycardia requiring transcutaneous pacing. She is intubated and sedated. Patient sent by helicopter from [facility] for bradycardia following verapamil  "intoxication. Psychiatry was consulted on 10/11/2023 for anxiety, etoh use, and possible depression.    On initial assessment, patient was fluctuating in consciousness and unable to dissipate in a full psychiatric interview.  When patient was awake, her thought process was disorganized and illogical.  Per collateral with , patient is noted to drink daily and suffer from mild withdrawal symptoms when she does not drink.  It remains unclear if this was an intentional overdose or accidental.  Patient remains encephalopathic likely multifactorial in nature, and can recommend plan as stated below.      IMPRESSION  #Acute encephalopathy, mixed, likely multifactorial     RECOMMENDATIONS    Safety:  - Patient  does not currently meet criteria for inpatient psychiatric admission.   - Patient lacks the capacity to leave AMA at this time and thus cannot leave AMA. Call CODE VIOLET if patient attempts to leave AMA.  - To evaluate decision-making capacity, recommend use of the Capacity Evaluation Tool. Search “ IP Capacity Evaluation under SmartText\" unless the patient has a legal guardian, in which case all decisions per the legal guardian.  - Patient  does require a 1:1 sitter from a psychiatric perspective at this time.  - Defer to primary team decision for 1:1 sitter for safety precautions.  - As with all hospitalized patients, would recommend delirium precautions, as below.    Work-up:  - consider CT head for new onset encephalopathy that is likely multifactorial  - Update ECG for Qtc monitoring  - continue electrolyte replacement   - monitor LFT's    Medications:  - change to thiamine 500mg IV TID for three days then Thiamine 100mg PO qdaily for vitamin supplementation  - START melatonin 5mg PO  if able to take PO.  - continue CIWA protocol for alcohol withdrawal      Ancillary Services:  - Recommend , pet/music/art therapy consult when patient's encephalopathy improvs      - Discussed recommendations " with primary team.  - Psychiatry will continue to follow     Thank you for allowing us to participate in the care of this patient. Please page [phone] with any questions or concerns.    Patient seen and discussed with Dr. Michaels, who agrees with above plan.    Medication Consent  Medication Consent: n/a; consult service    Hugo Beaver MD     Delirium Guidelines  Non-Pharmacologic:  - Assess visual and hearing impairments and provide aids and communication boards.  - Assess immobility and advocate for early evaluation and intervention by physical therapy, out of bed when medically indicated, and expeditious removal of tethers.  - Promote physiologic sleep and maintenance of sleep/wake cycle by ensuring blinds are open during the day, maintaining dark/quiet room at night with minimal interruptions, and minimizing daytime naps.  - Minimize room and staff changes.  - Engage the patient in cognitively stimulating activities and provide frequent reorientation.   - Minimize use of restraints to situations where necessary to keep patient and staff safe and to prevent from removing lines, tubes, medical devices, dressings, etc.     Pharmacologic:  - Minimize use of deliriogenic medications such as benzodiazepines, anticholinergic medications, and opiates (while ensuring adequate treatment of pain).  - Assess and treat disruption in bowel and bladder function.   - Assess and treat abnormalities in nutrition and hydration status.

## 2023-10-12 LAB
ABO GROUP (TYPE) IN BLOOD: NORMAL
ALBUMIN SERPL BCP-MCNC: 3.3 G/DL (ref 3.4–5)
ALP SERPL-CCNC: 84 U/L (ref 33–136)
ALT SERPL W P-5'-P-CCNC: 152 U/L (ref 7–45)
ANION GAP SERPL CALC-SCNC: 13 MMOL/L (ref 10–20)
APTT PPP: 25 SECONDS (ref 27–38)
AST SERPL W P-5'-P-CCNC: 133 U/L (ref 9–39)
BILIRUB SERPL-MCNC: 0.9 MG/DL (ref 0–1.2)
BUN SERPL-MCNC: 6 MG/DL (ref 6–23)
CALCIUM SERPL-MCNC: 8.8 MG/DL (ref 8.6–10.6)
CHLORIDE SERPL-SCNC: 94 MMOL/L (ref 98–107)
CO2 SERPL-SCNC: 31 MMOL/L (ref 21–32)
CREAT SERPL-MCNC: 0.37 MG/DL (ref 0.5–1.05)
ERYTHROCYTE [DISTWIDTH] IN BLOOD BY AUTOMATED COUNT: 11.2 % (ref 11.5–14.5)
GFR SERPL CREATININE-BSD FRML MDRD: >90 ML/MIN/1.73M*2
GLUCOSE SERPL-MCNC: 86 MG/DL (ref 74–99)
HCT VFR BLD AUTO: 30.3 % (ref 36–46)
HCV AB SER QL: NONREACTIVE
HGB BLD-MCNC: 10.7 G/DL (ref 12–16)
INR PPP: 0.9 (ref 0.9–1.1)
MAGNESIUM SERPL-MCNC: 2 MG/DL (ref 1.6–2.4)
MCH RBC QN AUTO: 33.4 PG (ref 26–34)
MCHC RBC AUTO-ENTMCNC: 35.3 G/DL (ref 32–36)
MCV RBC AUTO: 95 FL (ref 80–100)
NRBC BLD-RTO: 0 /100 WBCS (ref 0–0)
PHOSPHATE SERPL-MCNC: 4.1 MG/DL (ref 2.5–4.9)
PLATELET # BLD AUTO: 69 X10*3/UL (ref 150–450)
PMV BLD AUTO: 14.1 FL (ref 7.5–11.5)
POTASSIUM SERPL-SCNC: 3.4 MMOL/L (ref 3.5–5.3)
PROT SERPL-MCNC: 5.6 G/DL (ref 6.4–8.2)
PROTHROMBIN TIME: 10.4 SECONDS (ref 9.8–12.8)
RBC # BLD AUTO: 3.2 X10*6/UL (ref 4–5.2)
RH FACTOR (ANTIGEN D): NORMAL
SODIUM SERPL-SCNC: 135 MMOL/L (ref 136–145)
WBC # BLD AUTO: 10.7 X10*3/UL (ref 4.4–11.3)

## 2023-10-12 PROCEDURE — 2500000005 HC RX 250 GENERAL PHARMACY W/O HCPCS

## 2023-10-12 PROCEDURE — 2500000001 HC RX 250 WO HCPCS SELF ADMINISTERED DRUGS (ALT 637 FOR MEDICARE OP)

## 2023-10-12 PROCEDURE — 86803 HEPATITIS C AB TEST: CPT | Mod: CMCLAB

## 2023-10-12 PROCEDURE — 2500000004 HC RX 250 GENERAL PHARMACY W/ HCPCS (ALT 636 FOR OP/ED)

## 2023-10-12 PROCEDURE — 85027 COMPLETE CBC AUTOMATED: CPT

## 2023-10-12 PROCEDURE — S0166 INJ OLANZAPINE 2.5MG: HCPCS

## 2023-10-12 PROCEDURE — 96372 THER/PROPH/DIAG INJ SC/IM: CPT

## 2023-10-12 PROCEDURE — 99291 CRITICAL CARE FIRST HOUR: CPT

## 2023-10-12 PROCEDURE — 85610 PROTHROMBIN TIME: CPT

## 2023-10-12 PROCEDURE — 1200000002 HC GENERAL ROOM WITH TELEMETRY DAILY

## 2023-10-12 PROCEDURE — 93010 ELECTROCARDIOGRAM REPORT: CPT | Performed by: INTERNAL MEDICINE

## 2023-10-12 PROCEDURE — 99291 CRITICAL CARE FIRST HOUR: CPT | Performed by: EMERGENCY MEDICINE

## 2023-10-12 PROCEDURE — 2500000004 HC RX 250 GENERAL PHARMACY W/ HCPCS (ALT 636 FOR OP/ED): Performed by: STUDENT IN AN ORGANIZED HEALTH CARE EDUCATION/TRAINING PROGRAM

## 2023-10-12 PROCEDURE — 83735 ASSAY OF MAGNESIUM: CPT

## 2023-10-12 PROCEDURE — A4217 STERILE WATER/SALINE, 500 ML: HCPCS

## 2023-10-12 PROCEDURE — 93005 ELECTROCARDIOGRAM TRACING: CPT

## 2023-10-12 PROCEDURE — 84100 ASSAY OF PHOSPHORUS: CPT

## 2023-10-12 PROCEDURE — 80053 COMPREHEN METABOLIC PANEL: CPT

## 2023-10-12 PROCEDURE — 36415 COLL VENOUS BLD VENIPUNCTURE: CPT

## 2023-10-12 PROCEDURE — 36415 COLL VENOUS BLD VENIPUNCTURE: CPT | Mod: CMCLAB

## 2023-10-12 RX ORDER — ACETAMINOPHEN 325 MG/1
975 TABLET ORAL EVERY 6 HOURS PRN
Status: DISCONTINUED | OUTPATIENT
Start: 2023-10-12 | End: 2023-10-18 | Stop reason: HOSPADM

## 2023-10-12 RX ORDER — ACETAMINOPHEN 500 MG
5 TABLET ORAL NIGHTLY
Status: DISCONTINUED | OUTPATIENT
Start: 2023-10-12 | End: 2023-10-18 | Stop reason: HOSPADM

## 2023-10-12 RX ORDER — OLANZAPINE 10 MG/2ML
5 INJECTION, POWDER, FOR SOLUTION INTRAMUSCULAR NIGHTLY PRN
Status: DISCONTINUED | OUTPATIENT
Start: 2023-10-12 | End: 2023-10-13

## 2023-10-12 RX ORDER — WATER 1000 ML/1000ML
INJECTION, SOLUTION INTRAVENOUS
Status: COMPLETED
Start: 2023-10-12 | End: 2023-10-12

## 2023-10-12 RX ORDER — POTASSIUM CHLORIDE 14.9 MG/ML
20 INJECTION INTRAVENOUS
Status: COMPLETED | OUTPATIENT
Start: 2023-10-12 | End: 2023-10-12

## 2023-10-12 RX ORDER — LANOLIN ALCOHOL/MO/W.PET/CERES
100 CREAM (GRAM) TOPICAL DAILY
Status: DISCONTINUED | OUTPATIENT
Start: 2023-10-12 | End: 2023-10-12

## 2023-10-12 RX ORDER — ENOXAPARIN SODIUM 100 MG/ML
40 INJECTION SUBCUTANEOUS DAILY
Status: DISCONTINUED | OUTPATIENT
Start: 2023-10-12 | End: 2023-10-18 | Stop reason: HOSPADM

## 2023-10-12 RX ADMIN — LORAZEPAM 2 MG: 2 INJECTION INTRAMUSCULAR; INTRAVENOUS at 20:55

## 2023-10-12 RX ADMIN — FOLIC ACID 1 MG: 1 TABLET ORAL at 09:24

## 2023-10-12 RX ADMIN — THIAMINE HYDROCHLORIDE 500 MG: 200 INJECTION, SOLUTION INTRAMUSCULAR; INTRAVENOUS at 16:57

## 2023-10-12 RX ADMIN — LORAZEPAM 0.5 MG: 2 INJECTION INTRAMUSCULAR; INTRAVENOUS at 09:27

## 2023-10-12 RX ADMIN — LORAZEPAM 2 MG: 2 INJECTION INTRAMUSCULAR; INTRAVENOUS at 14:51

## 2023-10-12 RX ADMIN — SENNOSIDES 8.6 MG: 8.6 TABLET, FILM COATED ORAL at 09:24

## 2023-10-12 RX ADMIN — Medication 5 MG: at 20:55

## 2023-10-12 RX ADMIN — LOSARTAN POTASSIUM 50 MG: 50 TABLET, FILM COATED ORAL at 09:24

## 2023-10-12 RX ADMIN — LIDOCAINE 1 PATCH: 4 PATCH TOPICAL at 09:00

## 2023-10-12 RX ADMIN — DEXMEDETOMIDINE HYDROCHLORIDE 0.5 MCG/KG/HR: 400 INJECTION INTRAVENOUS at 07:30

## 2023-10-12 RX ADMIN — POTASSIUM CHLORIDE 20 MEQ: 14.9 INJECTION, SOLUTION INTRAVENOUS at 09:23

## 2023-10-12 RX ADMIN — SENNOSIDES 8.6 MG: 8.6 TABLET, FILM COATED ORAL at 20:55

## 2023-10-12 RX ADMIN — ACETAMINOPHEN 975 MG: 325 TABLET ORAL at 20:55

## 2023-10-12 RX ADMIN — THIAMINE HYDROCHLORIDE 500 MG: 200 INJECTION, SOLUTION INTRAMUSCULAR; INTRAVENOUS at 11:06

## 2023-10-12 RX ADMIN — POTASSIUM CHLORIDE 20 MEQ: 14.9 INJECTION, SOLUTION INTRAVENOUS at 11:31

## 2023-10-12 RX ADMIN — ENOXAPARIN SODIUM 40 MG: 40 INJECTION SUBCUTANEOUS at 12:26

## 2023-10-12 RX ADMIN — LORAZEPAM 2 MG: 2 INJECTION INTRAMUSCULAR; INTRAVENOUS at 22:58

## 2023-10-12 RX ADMIN — POLYETHYLENE GLYCOL 3350 17 G: 17 POWDER, FOR SOLUTION ORAL at 09:22

## 2023-10-12 RX ADMIN — THIAMINE HYDROCHLORIDE 500 MG: 200 INJECTION, SOLUTION INTRAMUSCULAR; INTRAVENOUS at 21:02

## 2023-10-12 RX ADMIN — WATER: 1 INJECTION INTRAMUSCULAR; INTRAVENOUS; SUBCUTANEOUS at 23:30

## 2023-10-12 RX ADMIN — LORAZEPAM 1 MG: 2 INJECTION INTRAMUSCULAR; INTRAVENOUS at 01:41

## 2023-10-12 RX ADMIN — OLANZAPINE 5 MG: 10 INJECTION, POWDER, FOR SOLUTION INTRAMUSCULAR at 23:32

## 2023-10-12 RX ADMIN — Medication 1 TABLET: at 09:00

## 2023-10-12 RX ADMIN — LORAZEPAM 2 MG: 2 INJECTION INTRAMUSCULAR; INTRAVENOUS at 04:33

## 2023-10-12 ASSESSMENT — LIFESTYLE VARIABLES
TOTAL SCORE: 5
ORIENTATION AND CLOUDING OF SENSORIUM: ORIENTED AND CAN DO SERIAL ADDITIONS
AUDITORY DISTURBANCES: NOT PRESENT
AGITATION: NORMAL ACTIVITY
PAROXYSMAL SWEATS: NO SWEAT VISIBLE
VISUAL DISTURBANCES: NOT PRESENT
PAROXYSMAL SWEATS: BARELY PERCEPTIBLE SWEATING, PALMS MOIST
AUDITORY DISTURBANCES: MODERATELY SEVERE HALLUCINATIONS
PAROXYSMAL SWEATS: 2
HEADACHE, FULLNESS IN HEAD: NOT PRESENT
HEADACHE, FULLNESS IN HEAD: NOT PRESENT
ORIENTATION AND CLOUDING OF SENSORIUM: ORIENTED AND CAN DO SERIAL ADDITIONS
TOTAL SCORE: 4
ORIENTATION AND CLOUDING OF SENSORIUM: CANNOT DO SERIAL ADDITIONS OR IS UNCERTAIN ABOUT DATE
PAROXYSMAL SWEATS: NO SWEAT VISIBLE
NAUSEA AND VOMITING: 6
TACTILE DISTURBANCES: MODERATELY SEVERE HALLUCINATIONS
HEADACHE, FULLNESS IN HEAD: NOT PRESENT
AGITATION: SOMEWHAT MORE THAN NORMAL ACTIVITY
VISUAL DISTURBANCES: MODERATELY SEVERE HALLUCINATIONS
AGITATION: 2
VISUAL DISTURBANCES: NOT PRESENT
TREMOR: NOT VISIBLE, BUT CAN BE FELT FINGERTIP TO FINGERTIP
AGITATION: 2
NAUSEA AND VOMITING: MILD NAUSEA WITH NO VOMITING
VISUAL DISTURBANCES: NOT PRESENT
ANXIETY: MILDLY ANXIOUS
PAROXYSMAL SWEATS: NO SWEAT VISIBLE
NAUSEA AND VOMITING: NO NAUSEA AND NO VOMITING
TOTAL SCORE: 10
PULSE: 89
AGITATION: SOMEWHAT MORE THAN NORMAL ACTIVITY
TOTAL SCORE: 7
VISUAL DISTURBANCES: NOT PRESENT
HEADACHE, FULLNESS IN HEAD: MILD
NAUSEA AND VOMITING: NO NAUSEA AND NO VOMITING
TREMOR: NO TREMOR
HEADACHE, FULLNESS IN HEAD: NOT PRESENT
AUDITORY DISTURBANCES: VERY MILD HARSHNESS OR ABILITY TO FRIGHTEN
ANXIETY: MILDLY ANXIOUS
NAUSEA AND VOMITING: NO NAUSEA AND NO VOMITING
AGITATION: 5
PAROXYSMAL SWEATS: NO SWEAT VISIBLE
AUDITORY DISTURBANCES: VERY MILD HARSHNESS OR ABILITY TO FRIGHTEN
ANXIETY: 2
VISUAL DISTURBANCES: NOT PRESENT
ORIENTATION AND CLOUDING OF SENSORIUM: DISORIENTED FOR DATE BY MORE THAN 2 CALENDAR DAYS
AUDITORY DISTURBANCES: NOT PRESENT
NAUSEA AND VOMITING: NO NAUSEA AND NO VOMITING
PAROXYSMAL SWEATS: NO SWEAT VISIBLE
PAROXYSMAL SWEATS: NO SWEAT VISIBLE
TREMOR: NO TREMOR
AUDITORY DISTURBANCES: VERY MILD HARSHNESS OR ABILITY TO FRIGHTEN
TOTAL SCORE: 11
ANXIETY: 2
ANXIETY: 2
TREMOR: NO TREMOR
TREMOR: NOT VISIBLE, BUT CAN BE FELT FINGERTIP TO FINGERTIP
HEADACHE, FULLNESS IN HEAD: NOT PRESENT
AUDITORY DISTURBANCES: MODERATE HARSHNESS OR ABILITY TO FRIGHTEN
ANXIETY: MILDLY ANXIOUS
ANXIETY: MILDLY ANXIOUS
ORIENTATION AND CLOUDING OF SENSORIUM: ORIENTED AND CAN DO SERIAL ADDITIONS
HEADACHE, FULLNESS IN HEAD: NOT PRESENT
VISUAL DISTURBANCES: NOT PRESENT
AGITATION: 3
NAUSEA AND VOMITING: NO NAUSEA AND NO VOMITING
TREMOR: NO TREMOR
ORIENTATION AND CLOUDING OF SENSORIUM: DISORIENTED FOR DATE BY MORE THAN 2 CALENDAR DAYS
NAUSEA AND VOMITING: NO NAUSEA AND NO VOMITING
ORIENTATION AND CLOUDING OF SENSORIUM: ORIENTED AND CAN DO SERIAL ADDITIONS
TREMOR: NO TREMOR
TOTAL SCORE: 22
TOTAL SCORE: 9
AGITATION: 2
HEADACHE, FULLNESS IN HEAD: NOT PRESENT
ORIENTATION AND CLOUDING OF SENSORIUM: ORIENTED AND CAN DO SERIAL ADDITIONS
TOTAL SCORE: 5
NAUSEA AND VOMITING: MILD NAUSEA WITH NO VOMITING
AGITATION: 2
AUDITORY DISTURBANCES: MILD HARSHNESS OR ABILITY TO FRIGHTEN
ORIENTATION AND CLOUDING OF SENSORIUM: ORIENTED AND CAN DO SERIAL ADDITIONS
ANXIETY: MILDLY ANXIOUS
TOTAL SCORE: 5
PAROXYSMAL SWEATS: NO SWEAT VISIBLE
AUDITORY DISTURBANCES: NOT PRESENT
TREMOR: 3
VISUAL DISTURBANCES: VERY MILD SENSITIVITY
TREMOR: NO TREMOR
HEADACHE, FULLNESS IN HEAD: NOT PRESENT
VISUAL DISTURBANCES: NOT PRESENT
ANXIETY: 3

## 2023-10-12 ASSESSMENT — PAIN SCALES - GENERAL
PAINLEVEL_OUTOF10: 5 - MODERATE PAIN
PAINLEVEL_OUTOF10: 9
PAINLEVEL_OUTOF10: 0 - NO PAIN
PAINLEVEL_OUTOF10: 3
PAINLEVEL_OUTOF10: 0 - NO PAIN
PAINLEVEL_OUTOF10: 0 - NO PAIN

## 2023-10-12 ASSESSMENT — PAIN - FUNCTIONAL ASSESSMENT
PAIN_FUNCTIONAL_ASSESSMENT: 0-10

## 2023-10-12 ASSESSMENT — ENCOUNTER SYMPTOMS
SHORTNESS OF BREATH: 0
ABDOMINAL PAIN: 0
CHEST TIGHTNESS: 0

## 2023-10-12 NOTE — PROGRESS NOTES
"Kathleen Hamman is a 62 y.o. female on day 3 of admission presenting with Bradycardia.    Subjective   Patient more awake, alert. Required dosing of lorazepam due to agitation overnight. Mental status improved this am, able to answer questions appropriately and anxious to leave hospital. Denies personal history of withdrawal,  reported to prior team that she has had symptoms consistent with alcohol withdrawal at home.        Objective     Physical Exam  Awake, alert and oriented, nad  NCAT  Neck: ecchymosis over previous line site   RRR, no mrg  CTAB, no increased wob  Abdomen soft, ntnd  Ext: no peripheral edema, no deformity  Neuro: no tremors, clonus or tongue fasciculations, moving extremities equally  Psych: normal mood and affect    Last Recorded Vitals  Blood pressure 133/89, pulse 89, temperature 36 °C (96.8 °F), temperature source Temporal, resp. rate 25, height 1.702 m (5' 7\"), weight 60.2 kg (132 lb 11.5 oz), SpO2 97 %.  Intake/Output last 3 Shifts:  I/O last 3 completed shifts:  In: 1648.8 (27.4 mL/kg) [P.O.:340; I.V.:256.3 (4.3 mL/kg); IV Piggyback:1052.5]  Out: 1175 (19.5 mL/kg) [Urine:1175 (0.5 mL/kg/hr)]  Weight: 60.2 kg     Relevant Results  Scheduled medications  folic acid, 1 mg, oral, Daily  lidocaine, 1 patch, transdermal, Daily  losartan, 50 mg, oral, Daily  melatonin, 5 mg, oral, Nightly  multivitamin with minerals, 1 tablet, oral, Daily  multivitamin with minerals, 1 tablet, oral, Daily  perflutren protein A microsphere, 0.5 mL, intravenous, Once in imaging  polyethylene glycol, 17 g, oral, Daily  potassium chloride, 20 mEq, intravenous, q2h  sennosides, 1 tablet, oral, BID  sulfur hexafluoride microsphr, 2 mL, intravenous, Once in imaging  thiamine, 500 mg, intravenous, TID      Continuous medications  dexmedeTOMIDine, 0.1-1.5 mcg/kg/hr, Last Rate: Stopped (10/12/23 1100)      PRN medications  PRN medications: dextrose, glucagon, LORazepam **OR** LORazepam **OR** LORazepam, oxyCODONE, " oxygen, trimethobenzamide  Results for orders placed or performed during the hospital encounter of 10/09/23 (from the past 24 hour(s))   Hepatic function panel   Result Value Ref Range    Albumin 3.0 (L) 3.4 - 5.0 g/dL    Bilirubin, Total 0.8 0.0 - 1.2 mg/dL    Bilirubin, Direct 0.2 0.0 - 0.3 mg/dL    Alkaline Phosphatase 83 33 - 136 U/L     (H) 7 - 45 U/L     (H) 9 - 39 U/L    Total Protein 5.1 (L) 6.4 - 8.2 g/dL   Renal function panel   Result Value Ref Range    Glucose 88 74 - 99 mg/dL    Sodium 132 (L) 136 - 145 mmol/L    Potassium 3.9 3.5 - 5.3 mmol/L    Chloride 93 (L) 98 - 107 mmol/L    Bicarbonate 30 21 - 32 mmol/L    Anion Gap 13 10 - 20 mmol/L    Urea Nitrogen 5 (L) 6 - 23 mg/dL    Creatinine 0.39 (L) 0.50 - 1.05 mg/dL    eGFR >90 >60 mL/min/1.73m*2    Calcium 8.8 8.6 - 10.6 mg/dL    Phosphorus 4.2 2.5 - 4.9 mg/dL    Albumin 3.0 (L) 3.4 - 5.0 g/dL   Magnesium   Result Value Ref Range    Magnesium 2.30 1.60 - 2.40 mg/dL   Coagulation Screen   Result Value Ref Range    Protime 10.4 9.8 - 12.8 seconds    INR 0.9 0.9 - 1.1    aPTT 25 (L) 27 - 38 seconds   CBC   Result Value Ref Range    WBC 10.7 4.4 - 11.3 x10*3/uL    nRBC 0.0 0.0 - 0.0 /100 WBCs    RBC 3.20 (L) 4.00 - 5.20 x10*6/uL    Hemoglobin 10.7 (L) 12.0 - 16.0 g/dL    Hematocrit 30.3 (L) 36.0 - 46.0 %    MCV 95 80 - 100 fL    MCH 33.4 26.0 - 34.0 pg    MCHC 35.3 32.0 - 36.0 g/dL    RDW 11.2 (L) 11.5 - 14.5 %    Platelets 69 (L) 150 - 450 x10*3/uL    MPV 14.1 (H) 7.5 - 11.5 fL   Comprehensive metabolic panel   Result Value Ref Range    Glucose 86 74 - 99 mg/dL    Sodium 135 (L) 136 - 145 mmol/L    Potassium 3.4 (L) 3.5 - 5.3 mmol/L    Chloride 94 (L) 98 - 107 mmol/L    Bicarbonate 31 21 - 32 mmol/L    Anion Gap 13 10 - 20 mmol/L    Urea Nitrogen 6 6 - 23 mg/dL    Creatinine 0.37 (L) 0.50 - 1.05 mg/dL    eGFR >90 >60 mL/min/1.73m*2    Calcium 8.8 8.6 - 10.6 mg/dL    Albumin 3.3 (L) 3.4 - 5.0 g/dL    Alkaline Phosphatase 84 33 - 136 U/L     Total Protein 5.6 (L) 6.4 - 8.2 g/dL     (H) 9 - 39 U/L    Bilirubin, Total 0.9 0.0 - 1.2 mg/dL     (H) 7 - 45 U/L   Magnesium   Result Value Ref Range    Magnesium 2.00 1.60 - 2.40 mg/dL   Phosphorus   Result Value Ref Range    Phosphorus 4.1 2.5 - 4.9 mg/dL                    Assessment/Plan   Principal Problem:    Bradycardia    This is a 61 y/o F currently admitted to the CICU after being found unresponsive, hypotensive and bradycardic. She is s/p intubation and TVP placement. Med tox was consulted for further recs     #respiratory failure  #acute toxic encephalopathy  #bradycardia  #hypotension  #hyperglycemia  #hyponatremia  #hypokalemia  #transaminitis        Recommendations for hypotension/bradycardia  -resolved, off of support  Recs for transaminitis  -completed NAC course without issue     Recs for elevated CIWA scores  -CIWA scores improved to max of 10; improved with IV lorazepam  -dex drip requirements have improved  -CIWA elevations seem to occur mostly at night with confusion giving the highest scores  -sx consistent with a mixed picture delirium with ICU, msof, and possible withdrawal/untreated psychiatric illness  -continue thiamine/folate supplementation,  thiamine 500mg IV Q8 x3 days followed by 500mg PO/IV daily  -continue CIWA with IV lorazepam and can increase/adjust as needed  -add antipsychotic for agitation-haldol, Seroquel for sleep  -delirium precautions due to possible mixed picture of agitation  -wean dex as it can mask objective vs changes in withdrawal and does not show benefit in preventing/treating DTs/seizures         Recs were discussed with bedside nursing and the primary team.      Med tox will continue to follow, please contact through secure messaging for questions/concerns        I have personally spent 31 minutes of critical care time, exclusive of time spent on any procedures, in evaluation and management of this critically ill patient’s condition of delirium  and possible withdrawal. I provided the following critical care treatment: assessment, documentation, taking additional history, discussion with primary team and nursing, management of antidotes.     Bridgette Eckert DO     I spent 35 minutes in the professional and overall care of this patient.

## 2023-10-12 NOTE — HOSPITAL COURSE
62 year old female transported from [facility] to CICU on 10/9 morning for bradycardia needing transcutaneous pacing for verapamil intoxication. She arrived on Levophed @0.02, vasopressin 0.03, and insulin gtt @56 units (for inotropic effects iso CCB overdose). On arrival to CICU, patient immediately taken to cath lab for TVP placement. [place] [facility] [place] in [place]. Med tox at  also consulted. Patient noted to have transaminitis likely due to shock liver, and was given NAC until LFTs significantly reduced. Patient was in native rhythm not requiring pacing. All lines including TVP removed 10/9 afternoon. Patient extubated morning of 10/10 and currently on 2L NC. Patient has had some agitation/anxiety requiring precedex drip intermittently in CICU, which was weaned off 10/12 am. 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 Erika Syed). Liver US was attempted but patient refused exam. Patient has daily alcohol use history at least two glasses wine daily with full history unknown. Pt was on CIWA while in unit with scores initially up to 17. Pt not showing signs of withdrawal so CIWA discontinued. Patient denied SI on evaluation in the CICU and states she had been out of her carvedilol for a week so had been taking extra doses of her verapamil and losartan. Patient does have a psych history per  which patient denies so psych was consulted. Patient is not currently taking any psychiatric medications or seeing a psych provider.     Overnight 10/13 patient patient became hypertensive with Bps >200s/100s yesterday evening so was briefly started on nitroglycerin gtt that was later stopped. Patient was also also tachypneic into 50s with oxygen in the 70s and tachycardic to 130s-170s. Patient trialed on bipap with precedex gtt without success so code intubate called and patient intubated. Per intubation  procedure note, patient went into SVT with rates into 170s post intubation so amio gtt was started. Patient started on propofol for sedation but with hypotension was switched to fentanyl, precedex, and midazolam. Due to persistent hypotension, patient was started on Levophed. Bedside TTE without any acute findings, normal RV. New arterial line placed and central line placed given extended levophed requirements. She was started on vanc/zosyn on 10/14 and had positive UA. Sedation weaned 10/15 and patient extubated. She is off all drips. Vanc stopped 10/15 with blood cultures and urine culture growing E coli so she was switched to ceftriaxone.          To follow up:  -Got dose of CTX on 10/17, switched to PO cipro to start 10/18  -adjust home BP regimen as necessary (restarted her home losartan 100mg 10/17 and started nifedipine 60mg, do not restart coreg or verapamil her prior agents

## 2023-10-12 NOTE — PROGRESS NOTES
"Kathleen Hamman is a 62 y.o. female on day 3 of admission presenting with Bradycardia.    Subjective   Patient on decreased dosage of precedex gtt @ 0.5 since weaned off, resting comfortably in room.        Objective   Review of Systems   Respiratory:  Negative for chest tightness and shortness of breath.    Cardiovascular:  Negative for chest pain.   Gastrointestinal:  Negative for abdominal pain.        Physical Exam  Constitutional:       General: She is not in acute distress.     Appearance: She is not diaphoretic.      Comments: drowsy   HENT:      Head: Normocephalic and atraumatic.      Nose: Nose normal.   Eyes:      General: No scleral icterus.     Extraocular Movements: Extraocular movements intact.      Conjunctiva/sclera: Conjunctivae normal.      Pupils: Pupils are equal, round, and reactive to light.   Cardiovascular:      Rate and Rhythm: Normal rate and regular rhythm.   Pulmonary:      Effort: Pulmonary effort is normal.      Breath sounds: Normal breath sounds. No wheezing or rhonchi.      Comments: On 2L NC  Abdominal:      General: There is no distension.      Palpations: Abdomen is soft.      Tenderness: There is no guarding.      Comments: normal bowel sounds   Musculoskeletal:      Right lower leg: No edema.      Left lower leg: No edema.   Neurological:      Mental Status: She is oriented to person, place, and time.      Comments: Exhibits some confusion on questioning stating \"a mess up on a bike\" is why she's in hospital   Psychiatric:      Comments: Minimally engages, recalls and can describe why she took her extra medications         Last Recorded Vitals  Blood pressure 134/90, pulse 96, temperature 36.2 °C (97.2 °F), resp. rate 25, height 1.702 m (5' 7\"), weight 60.2 kg (132 lb 11.5 oz), SpO2 95 %.  Intake/Output last 3 Shifts:  I/O last 3 completed shifts:  In: 1648.8 (27.4 mL/kg) [P.O.:340; I.V.:256.3 (4.3 mL/kg); IV Piggyback:1052.5]  Out: 1175 (19.5 mL/kg) [Urine:1175 (0.5 " mL/kg/hr)]  Weight: 60.2 kg     Relevant Results           This patient currently has cardiac telemetry ordered; if you would like to modify or discontinue the telemetry order, click here to go to the orders activity to modify/discontinue the order.  Scheduled medications  folic acid, 1 mg, oral, Daily  lidocaine, 1 patch, transdermal, Daily  losartan, 50 mg, oral, Daily  melatonin, 5 mg, oral, Nightly  multivitamin with minerals, 1 tablet, oral, Daily  multivitamin with minerals, 1 tablet, oral, Daily  perflutren protein A microsphere, 0.5 mL, intravenous, Once in imaging  polyethylene glycol, 17 g, oral, Daily  potassium chloride, 20 mEq, intravenous, q2h  sennosides, 1 tablet, oral, BID  sulfur hexafluoride microsphr, 2 mL, intravenous, Once in imaging  thiamine, 100 mg, oral, Daily      Continuous medications  dexmedeTOMIDine, 0.1-1.5 mcg/kg/hr, Last Rate: 0.5 mcg/kg/hr (10/12/23 0600)    PRN medications  PRN medications: dextrose, glucagon, LORazepam **OR** LORazepam **OR** LORazepam, oxyCODONE, oxygen, trimethobenzamide     Results for orders placed or performed during the hospital encounter of 10/09/23 (from the past 24 hour(s))   Hepatic function panel   Result Value Ref Range    Albumin 3.0 (L) 3.4 - 5.0 g/dL    Bilirubin, Total 0.8 0.0 - 1.2 mg/dL    Bilirubin, Direct 0.2 0.0 - 0.3 mg/dL    Alkaline Phosphatase 83 33 - 136 U/L     (H) 7 - 45 U/L     (H) 9 - 39 U/L    Total Protein 5.1 (L) 6.4 - 8.2 g/dL   Renal function panel   Result Value Ref Range    Glucose 88 74 - 99 mg/dL    Sodium 132 (L) 136 - 145 mmol/L    Potassium 3.9 3.5 - 5.3 mmol/L    Chloride 93 (L) 98 - 107 mmol/L    Bicarbonate 30 21 - 32 mmol/L    Anion Gap 13 10 - 20 mmol/L    Urea Nitrogen 5 (L) 6 - 23 mg/dL    Creatinine 0.39 (L) 0.50 - 1.05 mg/dL    eGFR >90 >60 mL/min/1.73m*2    Calcium 8.8 8.6 - 10.6 mg/dL    Phosphorus 4.2 2.5 - 4.9 mg/dL    Albumin 3.0 (L) 3.4 - 5.0 g/dL   Magnesium   Result Value Ref Range     Magnesium 2.30 1.60 - 2.40 mg/dL   Coagulation Screen   Result Value Ref Range    Protime 10.4 9.8 - 12.8 seconds    INR 0.9 0.9 - 1.1    aPTT 25 (L) 27 - 38 seconds   CBC   Result Value Ref Range    WBC 10.7 4.4 - 11.3 x10*3/uL    nRBC 0.0 0.0 - 0.0 /100 WBCs    RBC 3.20 (L) 4.00 - 5.20 x10*6/uL    Hemoglobin 10.7 (L) 12.0 - 16.0 g/dL    Hematocrit 30.3 (L) 36.0 - 46.0 %    MCV 95 80 - 100 fL    MCH 33.4 26.0 - 34.0 pg    MCHC 35.3 32.0 - 36.0 g/dL    RDW 11.2 (L) 11.5 - 14.5 %    Platelets 69 (L) 150 - 450 x10*3/uL    MPV 14.1 (H) 7.5 - 11.5 fL   Comprehensive metabolic panel   Result Value Ref Range    Glucose 86 74 - 99 mg/dL    Sodium 135 (L) 136 - 145 mmol/L    Potassium 3.4 (L) 3.5 - 5.3 mmol/L    Chloride 94 (L) 98 - 107 mmol/L    Bicarbonate 31 21 - 32 mmol/L    Anion Gap 13 10 - 20 mmol/L    Urea Nitrogen 6 6 - 23 mg/dL    Creatinine 0.37 (L) 0.50 - 1.05 mg/dL    eGFR >90 >60 mL/min/1.73m*2    Calcium 8.8 8.6 - 10.6 mg/dL    Albumin 3.3 (L) 3.4 - 5.0 g/dL    Alkaline Phosphatase 84 33 - 136 U/L    Total Protein 5.6 (L) 6.4 - 8.2 g/dL     (H) 9 - 39 U/L    Bilirubin, Total 0.9 0.0 - 1.2 mg/dL     (H) 7 - 45 U/L   Magnesium   Result Value Ref Range    Magnesium 2.00 1.60 - 2.40 mg/dL   Phosphorus   Result Value Ref Range    Phosphorus 4.1 2.5 - 4.9 mg/dL                  Assessment/Plan   Principal Problem:    Bradycardia    62 [facility]2 to CCB intoxication with verapamil. Patient is clinically significantly improved from a cardiovascular standpoint. TVP and other lines removed 10/10.        Neuro/Psych  #Acute toxic encephalopathy   #Anxiety  -on precedex gtt, weaned off this morning   -pt denies SI  -Psych following       CV  #Bradycardia, resolved  #CCB intoxication  #Cardiogenic shock, resolved  -off pressors since 10/9  -Med tox following  -PRN zyprexa at bedtime      Resp  #s/p intubation for airway protection   -RSI  intubation @ [facility]  -extubated to NC around 11 am 10/10  -on 2L NC     GI  #Transaminitis likely 2/2 shock liver, improved  #Etoh use history  -s/p NAC, LFTs significantly downtrended  -CIWA protocol  -Liver US, not completed as pt refused     Endo  -no active issues     Renal  #DARIUS, resolved  #Hyponatremia likely 2/2 to hyperglycemia v etoh use history    #Hypokalemia  #Hypomagnesemia  #Lactic acidosis, resolved  -Monitor and replete electrolytes as needed  -lactate 6.3 on admission, since normalized   -s/p 1L LR on admission      Heme Onc  #Thrombocytopenia, unclear etiology   -plts stable in 60s  -Monitor, no s/s of bleeding  -HIV negative, Hep C pending   -Liver US not completed as pt refused     N: regular   DVT ppx: lovenox   A: R TVP, R radial A line, R fem A line, R fem swan (all lines removed 10/10)  Code Status: Full Code  NOK:  Eliseo [phone]                          Katherine Hurst MD

## 2023-10-12 NOTE — PROGRESS NOTES
"Kathleen Hamman is a 62 y.o. female on day 3 of admission presenting with Bradycardia.      Subjective   Diane was laying in bed and agreeable to discuss her condition with the psychiatry team. She endorsed being at her \"baseline, good\" mood, with great sleep quality and on/off appetite. She denied any tremors/shakiness, sweating, diarrhea or constipation, GI upset, gooseflesh.     When asked about events leading up to her hospitalization, she reports taking extra verapamil due to running out of her other BP medicines. She endorses having 2-3 glasses of wine around the same time she took her medicines as well. She denied ever taking opioids, contrary to her OARRS report showing she has filled prescriptions monthly for Vicodin. She denies ever having a depressed mood, SI, episodes of self-harm or thoughts of dying by suicide, contrary to her  Eliseo's report yesterday. Patient denies any increased stress or other triggers before her hospitalization. She reports good relationships with her elderly parents who live close, three adult children, and .     Denies presence of firearms in house. Denies any other PPHx, including SI, suicide attempts, self-harm, diagnoses of depression, anxiety and other common disorders, or hospitalizations. Expressed that she would never consider suicide as she has too many people depending on her (elderly family, children) and rich relationships with them       Objective     Last Recorded Vitals  Blood pressure (!) 147/95, pulse 90, temperature 36 °C (96.8 °F), temperature source Temporal, resp. rate (!) 31, height 1.702 m (5' 7\"), weight 60.2 kg (132 lb 11.5 oz), SpO2 95 %.    Psychiatric ROS - Adult  Anxiety: Negative  Depression: psychomotor agitation and sleep increased  Delirium: negative  Psychosis: negative  Mackenzie: negative  Safety Issues: none    Mental Status Exam  General: middle-aged  female, long white-gray hair, in hospital attire, disheveled. " "  Appearance: resting in bed. Somnolent but much improved from yesterday, mild fluctuations in somnolence    Attitude: calm, cooperative  Behavior: not agitated, overall calm. Patient was yawning multiple times per minute throughput interview  Motor Activity: frequent purposeless movements with her limbs in a writhing-like pattern. Gait not assessed; no EPS/TD appreciated.   Speech: slow rhythm, delayed responses; normal tone and prosody  Mood: \"baseline good\"   Affect: euthymic but slightly irritable. Congruent to mood. Appropriate to situation.  Thought Process: no SI/HI/thoughts of self-harm or passive death wish endorsed.  Thought Content: no SI/HI/thoughts of self-harm or passive death wish endorsed.  Thought Perception: no AVH reported. Does not appear internally stimulated  Cognition:   Insight: poor  Judgement: poor. Patient expressed desire to go home today, denies history of opioid usage. Denies psychiatric history, contrary to 's report    Psychiatric Risk Assessment  Violence Risk Assessment: none  Acute Risk of Harm to Others is Considered: low   Suicide Risk Assessment: none  Protective Factors against Suicide: hopefulness/future orientation, marriage/partnership, positive family relationships, sense of responsibility toward family, and social support/connectedness  Acute Risk of Harm to Self is Considered: low    Relevant Results          Assessment/Plan   Principal Problem:    Bradycardia  Kathleen Hamman is a 62 y.o. female presenting with bradycardia requiring transcutaneous pacing. She is intubated and sedated. Patient sent by helicopter from [facility] for bradycardia following verapamil intoxication. Psychiatry was consulted on 10/11/2023 for anxiety, etoh use, and possible depression.     On initial assessment, patient was fluctuating in consciousness and unable to dissipate in a full psychiatric interview.  When patient was awake, her thought process was disorganized and illogical.  Per " "collateral with , patient is noted to drink daily and suffer from mild withdrawal symptoms when she does not drink.  It remains unclear if this was an intentional overdose or accidental.  Patient remains encephalopathic likely multifactorial in nature, and can recommend plan as stated below.       10/12: Patient alert and oriented x4. Somnolent but much improved from yesterday and able to conduct interview. Denies history of filling opioid prescriptions contrary to OARRS and denies any psychiatric history, contrary to 's report of a prior suicide attempt via medicine overdose 25 or so years ago. Denies suicidal intent with overdose. Denies current SI, HI, AVH, depressed mood, increasing anxiety, or any other acute psychiatric concerns. With patient denying history of opioid dependence/usage, current excessive yawning and increase in limb movements, may consider opioid withdrawal. Follow up with [facility] for potentially starting COWS to monitor. Patient refused wanting to follow-up for any psychiatric assistance at this time. Psychiatry will sign off for now and can be re-consulted with any emergent psychiatric concerns.      IMPRESSION  #Acute encephalopathy, mixed, likely multifactorial      RECOMMENDATIONS     Safety:  - Patient  does not currently meet criteria for inpatient psychiatric admission.   - Patient lacks the capacity to leave AMA at this time and thus cannot leave AMA. Call [facility]IOLET if patient attempts to leave AMA.  - To evaluate decision-making capacity, recommend use of the Capacity Evaluation Tool. Search “ IP Capacity Evaluation under SmartText\" unless the patient has a legal guardian, in which case all decisions per the legal guardian.  - Patient  does require a 1:1 sitter from a psychiatric perspective at this time.  - Defer to primary team decision for 1:1 sitter for safety precautions.  - As with all hospitalized patients, would recommend delirium precautions, as below.   "   Work-up:  - consider CT head for new onset encephalopathy that is likely multifactorial  - Update ECG for Qtc monitoring  - continue electrolyte replacement   - monitor LFT's  - Consider adding COWS protocol to monitor for opioid withdrawal     Medications:  - change to thiamine 500mg IV TID for three days then Thiamine 100mg PO qdaily for vitamin supplementation  - START melatonin 5mg PO  if able to take PO.  - continue CIWA protocol for alcohol withdrawal        Ancillary Services:  - Recommend , pet/music/art therapy consult when patient's encephalopathy improvs        - Discussed recommendations with primary team.  - Psychiatry will sign off     Thank you for allowing us to participate in the care of this patient. Please page [phone] with any questions or concerns.     Medication Consent  Medication Consent: n/a; consult service       Delirium Guidelines  Non-Pharmacologic:  - Assess visual and hearing impairments and provide aids and communication boards.  - Assess immobility and advocate for early evaluation and intervention by physical therapy, out of bed when medically indicated, and expeditious removal of tethers.  - Promote physiologic sleep and maintenance of sleep/wake cycle by ensuring blinds are open during the day, maintaining dark/quiet room at night with minimal interruptions, and minimizing daytime naps.  - Minimize room and staff changes.  - Engage the patient in cognitively stimulating activities and provide frequent reorientation.   - Minimize use of restraints to situations where necessary to keep patient and staff safe and to prevent from removing lines, tubes, medical devices, dressings, etc.      Pharmacologic:  - Minimize use of deliriogenic medications such as benzodiazepines, anticholinergic medications, and opiates (while ensuring adequate treatment of pain).  - Assess and treat disruption in bowel and bladder function.   - Assess and treat abnormalities in nutrition and  hydration status.      Patient discussed with Dr. Schneider, who agrees with the above plan.    ARCHANA JACINTO    I examined & discussed the patient above. I have reviewed and agree with the excellent note above. Any edits have been made into the note. Patient to be discussed with the attending.  -Hugo Beaver, PGY-2 Adult Psychiatry

## 2023-10-13 ENCOUNTER — APPOINTMENT (OUTPATIENT)
Dept: RADIOLOGY | Facility: HOSPITAL | Age: 63
DRG: 917 | End: 2023-10-13
Payer: COMMERCIAL

## 2023-10-13 LAB
ALBUMIN SERPL BCP-MCNC: 3.7 G/DL (ref 3.4–5)
ANION GAP SERPL CALC-SCNC: 14 MMOL/L (ref 10–20)
BASOPHILS # BLD AUTO: 0.03 X10*3/UL (ref 0–0.1)
BASOPHILS NFR BLD AUTO: 0.3 %
BUN SERPL-MCNC: 6 MG/DL (ref 6–23)
CALCIUM SERPL-MCNC: 9.3 MG/DL (ref 8.6–10.6)
CHLORIDE SERPL-SCNC: 90 MMOL/L (ref 98–107)
CO2 SERPL-SCNC: 32 MMOL/L (ref 21–32)
CREAT SERPL-MCNC: 0.47 MG/DL (ref 0.5–1.05)
EOSINOPHIL # BLD AUTO: 0.16 X10*3/UL (ref 0–0.7)
EOSINOPHIL NFR BLD AUTO: 1.7 %
ERYTHROCYTE [DISTWIDTH] IN BLOOD BY AUTOMATED COUNT: 11.2 % (ref 11.5–14.5)
ERYTHROCYTE [DISTWIDTH] IN BLOOD BY AUTOMATED COUNT: 11.4 % (ref 11.5–14.5)
GFR SERPL CREATININE-BSD FRML MDRD: >90 ML/MIN/1.73M*2
GLUCOSE SERPL-MCNC: 161 MG/DL (ref 74–99)
HCT VFR BLD AUTO: 30.4 % (ref 36–46)
HCT VFR BLD AUTO: 31.3 % (ref 36–46)
HGB BLD-MCNC: 10.6 G/DL (ref 12–16)
HGB BLD-MCNC: 11.4 G/DL (ref 12–16)
HOLD SPECIMEN: NORMAL
HOLD SPECIMEN: NORMAL
IMM GRANULOCYTES # BLD AUTO: 0.07 X10*3/UL (ref 0–0.7)
IMM GRANULOCYTES NFR BLD AUTO: 0.7 % (ref 0–0.9)
LACTATE SERPL-SCNC: 2.3 MMOL/L (ref 0.4–2)
LYMPHOCYTES # BLD AUTO: 0.8 X10*3/UL (ref 1.2–4.8)
LYMPHOCYTES NFR BLD AUTO: 8.3 %
MAGNESIUM SERPL-MCNC: 1.98 MG/DL (ref 1.6–2.4)
MCH RBC QN AUTO: 33.1 PG (ref 26–34)
MCH RBC QN AUTO: 33.2 PG (ref 26–34)
MCHC RBC AUTO-ENTMCNC: 34.9 G/DL (ref 32–36)
MCHC RBC AUTO-ENTMCNC: 36.4 G/DL (ref 32–36)
MCV RBC AUTO: 91 FL (ref 80–100)
MCV RBC AUTO: 95 FL (ref 80–100)
MONOCYTES # BLD AUTO: 1.38 X10*3/UL (ref 0.1–1)
MONOCYTES NFR BLD AUTO: 14.3 %
NEUTROPHILS # BLD AUTO: 7.22 X10*3/UL (ref 1.2–7.7)
NEUTROPHILS NFR BLD AUTO: 74.7 %
NRBC BLD-RTO: 0 /100 WBCS (ref 0–0)
NRBC BLD-RTO: 0 /100 WBCS (ref 0–0)
PHOSPHATE SERPL-MCNC: 3.9 MG/DL (ref 2.5–4.9)
PLATELET # BLD AUTO: 73 X10*3/UL (ref 150–450)
PLATELET # BLD AUTO: 91 X10*3/UL (ref 150–450)
PMV BLD AUTO: 12.6 FL (ref 7.5–11.5)
PMV BLD AUTO: 12.7 FL (ref 7.5–11.5)
POTASSIUM SERPL-SCNC: 3.3 MMOL/L (ref 3.5–5.3)
RBC # BLD AUTO: 3.2 X10*6/UL (ref 4–5.2)
RBC # BLD AUTO: 3.43 X10*6/UL (ref 4–5.2)
SODIUM SERPL-SCNC: 133 MMOL/L (ref 136–145)
WBC # BLD AUTO: 10.8 X10*3/UL (ref 4.4–11.3)
WBC # BLD AUTO: 9.7 X10*3/UL (ref 4.4–11.3)

## 2023-10-13 PROCEDURE — 83735 ASSAY OF MAGNESIUM: CPT | Mod: CMCLAB

## 2023-10-13 PROCEDURE — 2500000001 HC RX 250 WO HCPCS SELF ADMINISTERED DRUGS (ALT 637 FOR MEDICARE OP)

## 2023-10-13 PROCEDURE — 83605 ASSAY OF LACTIC ACID: CPT | Mod: CMCLAB | Performed by: STUDENT IN AN ORGANIZED HEALTH CARE EDUCATION/TRAINING PROGRAM

## 2023-10-13 PROCEDURE — 80069 RENAL FUNCTION PANEL: CPT | Mod: CMCLAB

## 2023-10-13 PROCEDURE — 97535 SELF CARE MNGMENT TRAINING: CPT | Mod: GO

## 2023-10-13 PROCEDURE — 99291 CRITICAL CARE FIRST HOUR: CPT | Performed by: EMERGENCY MEDICINE

## 2023-10-13 PROCEDURE — 03HY32Z INSERTION OF MONITORING DEVICE INTO UPPER ARTERY, PERCUTANEOUS APPROACH: ICD-10-PCS | Performed by: STUDENT IN AN ORGANIZED HEALTH CARE EDUCATION/TRAINING PROGRAM

## 2023-10-13 PROCEDURE — 85025 COMPLETE CBC W/AUTO DIFF WBC: CPT | Performed by: STUDENT IN AN ORGANIZED HEALTH CARE EDUCATION/TRAINING PROGRAM

## 2023-10-13 PROCEDURE — 31500 INSERT EMERGENCY AIRWAY: CPT | Mod: GC | Performed by: STUDENT IN AN ORGANIZED HEALTH CARE EDUCATION/TRAINING PROGRAM

## 2023-10-13 PROCEDURE — 1200000002 HC GENERAL ROOM WITH TELEMETRY DAILY

## 2023-10-13 PROCEDURE — 99291 CRITICAL CARE FIRST HOUR: CPT

## 2023-10-13 PROCEDURE — 37799 UNLISTED PX VASCULAR SURGERY: CPT | Performed by: STUDENT IN AN ORGANIZED HEALTH CARE EDUCATION/TRAINING PROGRAM

## 2023-10-13 PROCEDURE — 2500000004 HC RX 250 GENERAL PHARMACY W/ HCPCS (ALT 636 FOR OP/ED)

## 2023-10-13 PROCEDURE — 97530 THERAPEUTIC ACTIVITIES: CPT | Mod: GP

## 2023-10-13 PROCEDURE — 4A133J1 MONITORING OF ARTERIAL PULSE, PERIPHERAL, PERCUTANEOUS APPROACH: ICD-10-PCS | Performed by: STUDENT IN AN ORGANIZED HEALTH CARE EDUCATION/TRAINING PROGRAM

## 2023-10-13 PROCEDURE — 36620 INSERTION CATHETER ARTERY: CPT | Performed by: STUDENT IN AN ORGANIZED HEALTH CARE EDUCATION/TRAINING PROGRAM

## 2023-10-13 PROCEDURE — 96372 THER/PROPH/DIAG INJ SC/IM: CPT

## 2023-10-13 PROCEDURE — 4A133B1 MONITORING OF ARTERIAL PRESSURE, PERIPHERAL, PERCUTANEOUS APPROACH: ICD-10-PCS | Performed by: STUDENT IN AN ORGANIZED HEALTH CARE EDUCATION/TRAINING PROGRAM

## 2023-10-13 PROCEDURE — 85027 COMPLETE CBC AUTOMATED: CPT | Mod: CMCLAB

## 2023-10-13 PROCEDURE — 36415 COLL VENOUS BLD VENIPUNCTURE: CPT | Mod: CMCLAB | Performed by: STUDENT IN AN ORGANIZED HEALTH CARE EDUCATION/TRAINING PROGRAM

## 2023-10-13 PROCEDURE — 31500 INSERT EMERGENCY AIRWAY: CPT | Performed by: STUDENT IN AN ORGANIZED HEALTH CARE EDUCATION/TRAINING PROGRAM

## 2023-10-13 PROCEDURE — 2500000005 HC RX 250 GENERAL PHARMACY W/O HCPCS

## 2023-10-13 PROCEDURE — 36415 COLL VENOUS BLD VENIPUNCTURE: CPT | Mod: CMCLAB

## 2023-10-13 PROCEDURE — 02HV33Z INSERTION OF INFUSION DEVICE INTO SUPERIOR VENA CAVA, PERCUTANEOUS APPROACH: ICD-10-PCS | Performed by: STUDENT IN AN ORGANIZED HEALTH CARE EDUCATION/TRAINING PROGRAM

## 2023-10-13 PROCEDURE — 84132 ASSAY OF SERUM POTASSIUM: CPT | Performed by: STUDENT IN AN ORGANIZED HEALTH CARE EDUCATION/TRAINING PROGRAM

## 2023-10-13 PROCEDURE — 5A1223Z PERFORMANCE OF CARDIAC PACING, CONTINUOUS: ICD-10-PCS | Performed by: STUDENT IN AN ORGANIZED HEALTH CARE EDUCATION/TRAINING PROGRAM

## 2023-10-13 PROCEDURE — 71045 X-RAY EXAM CHEST 1 VIEW: CPT | Performed by: RADIOLOGY

## 2023-10-13 PROCEDURE — 97530 THERAPEUTIC ACTIVITIES: CPT | Mod: GO

## 2023-10-13 PROCEDURE — 83735 ASSAY OF MAGNESIUM: CPT | Mod: CMCLAB | Performed by: STUDENT IN AN ORGANIZED HEALTH CARE EDUCATION/TRAINING PROGRAM

## 2023-10-13 PROCEDURE — 71045 X-RAY EXAM CHEST 1 VIEW: CPT | Mod: FY

## 2023-10-13 RX ORDER — PHENYLEPHRINE HCL IN 0.9% NACL 0.4MG/10ML
SYRINGE (ML) INTRAVENOUS
Status: DISPENSED
Start: 2023-10-13 | End: 2023-10-14

## 2023-10-13 RX ORDER — POTASSIUM CHLORIDE 14.9 MG/ML
20 INJECTION INTRAVENOUS
Status: COMPLETED | OUTPATIENT
Start: 2023-10-13 | End: 2023-10-13

## 2023-10-13 RX ORDER — DEXMEDETOMIDINE HYDROCHLORIDE 4 UG/ML
INJECTION, SOLUTION INTRAVENOUS
Status: COMPLETED
Start: 2023-10-13 | End: 2023-10-14

## 2023-10-13 RX ORDER — FENTANYL CITRATE-0.9 % NACL/PF 10 MCG/ML
25-200 PLASTIC BAG, INJECTION (ML) INTRAVENOUS CONTINUOUS
Status: DISCONTINUED | OUTPATIENT
Start: 2023-10-13 | End: 2023-10-16

## 2023-10-13 RX ORDER — OLANZAPINE 10 MG/2ML
2.5 INJECTION, POWDER, FOR SOLUTION INTRAMUSCULAR NIGHTLY PRN
Status: DISCONTINUED | OUTPATIENT
Start: 2023-10-13 | End: 2023-10-14

## 2023-10-13 RX ORDER — ROCURONIUM BROMIDE 10 MG/ML
INJECTION, SOLUTION INTRAVENOUS
Status: COMPLETED
Start: 2023-10-13 | End: 2023-10-14

## 2023-10-13 RX ORDER — LOSARTAN POTASSIUM 50 MG/1
100 TABLET ORAL DAILY
Status: DISCONTINUED | OUTPATIENT
Start: 2023-10-14 | End: 2023-10-17

## 2023-10-13 RX ORDER — BENZONATATE 100 MG/1
200 CAPSULE ORAL 3 TIMES DAILY PRN
Status: DISCONTINUED | OUTPATIENT
Start: 2023-10-13 | End: 2023-10-18 | Stop reason: HOSPADM

## 2023-10-13 RX ORDER — ATROPINE SULFATE 0.1 MG/ML
INJECTION INTRAVENOUS
Status: DISPENSED
Start: 2023-10-13 | End: 2023-10-14

## 2023-10-13 RX ORDER — LOSARTAN POTASSIUM 50 MG/1
50 TABLET ORAL ONCE
Status: COMPLETED | OUTPATIENT
Start: 2023-10-13 | End: 2023-10-13

## 2023-10-13 RX ORDER — NITROGLYCERIN 20 MG/100ML
INJECTION INTRAVENOUS
Status: COMPLETED
Start: 2023-10-13 | End: 2023-10-13

## 2023-10-13 RX ORDER — MIDAZOLAM HYDROCHLORIDE 1 MG/ML
INJECTION INTRAMUSCULAR; INTRAVENOUS
Status: COMPLETED
Start: 2023-10-13 | End: 2023-10-14

## 2023-10-13 RX ORDER — PROPOFOL 10 MG/ML
5-20 INJECTION, EMULSION INTRAVENOUS CONTINUOUS
Status: DISCONTINUED | OUTPATIENT
Start: 2023-10-13 | End: 2023-10-13

## 2023-10-13 RX ORDER — DEXMEDETOMIDINE HYDROCHLORIDE 4 UG/ML
.1-1.5 INJECTION, SOLUTION INTRAVENOUS CONTINUOUS
Status: DISCONTINUED | OUTPATIENT
Start: 2023-10-13 | End: 2023-10-17

## 2023-10-13 RX ORDER — PROPOFOL 10 MG/ML
INJECTION, EMULSION INTRAVENOUS
Status: DISPENSED
Start: 2023-10-13 | End: 2023-10-14

## 2023-10-13 RX ORDER — SODIUM NITROPRUSSIDE IN 0.9% SODIUM CHLORIDE 0.5 MG/ML
0-5 INJECTION INTRAVENOUS CONTINUOUS
Status: DISCONTINUED | OUTPATIENT
Start: 2023-10-13 | End: 2023-10-13

## 2023-10-13 RX ORDER — ALBUTEROL SULFATE 0.83 MG/ML
2.5 SOLUTION RESPIRATORY (INHALATION) EVERY 6 HOURS PRN
Status: DISCONTINUED | OUTPATIENT
Start: 2023-10-13 | End: 2023-10-13

## 2023-10-13 RX ORDER — NITROGLYCERIN 20 MG/100ML
0-200 INJECTION INTRAVENOUS CONTINUOUS
Status: DISCONTINUED | OUTPATIENT
Start: 2023-10-13 | End: 2023-10-14

## 2023-10-13 RX ORDER — NOREPINEPHRINE BITARTRATE/D5W 8 MG/250ML
0-3.3 PLASTIC BAG, INJECTION (ML) INTRAVENOUS CONTINUOUS
Status: DISCONTINUED | OUTPATIENT
Start: 2023-10-13 | End: 2023-10-16

## 2023-10-13 RX ORDER — NOREPINEPHRINE BITARTRATE/D5W 8 MG/250ML
PLASTIC BAG, INJECTION (ML) INTRAVENOUS
Status: COMPLETED
Start: 2023-10-13 | End: 2023-10-13

## 2023-10-13 RX ADMIN — LOSARTAN POTASSIUM 50 MG: 50 TABLET, FILM COATED ORAL at 11:15

## 2023-10-13 RX ADMIN — SENNOSIDES 8.6 MG: 8.6 TABLET, FILM COATED ORAL at 08:23

## 2023-10-13 RX ADMIN — FOLIC ACID 1 MG: 1 TABLET ORAL at 08:23

## 2023-10-13 RX ADMIN — ENOXAPARIN SODIUM 40 MG: 40 INJECTION SUBCUTANEOUS at 08:23

## 2023-10-13 RX ADMIN — Medication 0.04 MCG/KG/MIN: at 20:25

## 2023-10-13 RX ADMIN — ACETAMINOPHEN 975 MG: 325 TABLET ORAL at 08:22

## 2023-10-13 RX ADMIN — POTASSIUM CHLORIDE 20 MEQ: 14.9 INJECTION, SOLUTION INTRAVENOUS at 15:53

## 2023-10-13 RX ADMIN — THIAMINE HYDROCHLORIDE 500 MG: 200 INJECTION, SOLUTION INTRAMUSCULAR; INTRAVENOUS at 16:04

## 2023-10-13 RX ADMIN — THIAMINE HYDROCHLORIDE 500 MG: 200 INJECTION, SOLUTION INTRAMUSCULAR; INTRAVENOUS at 08:24

## 2023-10-13 RX ADMIN — PROPOFOL 30 MCG/KG/MIN: 10 INJECTION, EMULSION INTRAVENOUS at 20:25

## 2023-10-13 RX ADMIN — LOSARTAN POTASSIUM 50 MG: 50 TABLET, FILM COATED ORAL at 08:23

## 2023-10-13 RX ADMIN — DEXMEDETOMIDINE HYDROCHLORIDE 0.4 MCG/KG/HR: 4 INJECTION, SOLUTION INTRAVENOUS at 19:35

## 2023-10-13 RX ADMIN — Medication 1 TABLET: at 09:00

## 2023-10-13 RX ADMIN — POTASSIUM CHLORIDE 20 MEQ: 14.9 INJECTION, SOLUTION INTRAVENOUS at 13:54

## 2023-10-13 RX ADMIN — NITROGLYCERIN 50 MCG/MIN: 20 INJECTION INTRAVENOUS at 19:40

## 2023-10-13 RX ADMIN — AMIODARONE HYDROCHLORIDE 1 MG/MIN: 1.8 INJECTION, SOLUTION INTRAVENOUS at 20:25

## 2023-10-13 RX ADMIN — POLYETHYLENE GLYCOL 3350 17 G: 17 POWDER, FOR SOLUTION ORAL at 08:24

## 2023-10-13 RX ADMIN — NOREPINEPHRINE BITARTRATE 0.04 MCG/KG/MIN: 8 INJECTION, SOLUTION INTRAVENOUS at 20:25

## 2023-10-13 RX ADMIN — BENZONATATE 200 MG: 100 CAPSULE ORAL at 16:53

## 2023-10-13 ASSESSMENT — LIFESTYLE VARIABLES
ANXIETY: NO ANXIETY, AT EASE
TOTAL SCORE: 4
AGITATION: SOMEWHAT MORE THAN NORMAL ACTIVITY
ORIENTATION AND CLOUDING OF SENSORIUM: ORIENTED AND CAN DO SERIAL ADDITIONS
PAROXYSMAL SWEATS: NO SWEAT VISIBLE
TOTAL SCORE: 4
AUDITORY DISTURBANCES: MODERATELY SEVERE HALLUCINATIONS
TOTAL SCORE: 8
ANXIETY: 2
PAROXYSMAL SWEATS: 2
TREMOR: NO TREMOR
NAUSEA AND VOMITING: NO NAUSEA AND NO VOMITING
ORIENTATION AND CLOUDING OF SENSORIUM: ORIENTED AND CAN DO SERIAL ADDITIONS
NAUSEA AND VOMITING: NO NAUSEA AND NO VOMITING
AGITATION: NORMAL ACTIVITY
ORIENTATION AND CLOUDING OF SENSORIUM: DISORIENTED FOR DATA BY NO MORE THAN 2 CALENDAR DAYS
AUDITORY DISTURBANCES: NOT PRESENT
TREMOR: NO TREMOR
VISUAL DISTURBANCES: NOT PRESENT
HEADACHE, FULLNESS IN HEAD: NOT PRESENT
AUDITORY DISTURBANCES: NOT PRESENT
NAUSEA AND VOMITING: NO NAUSEA AND NO VOMITING
ANXIETY: MILDLY ANXIOUS
PAROXYSMAL SWEATS: 2
VISUAL DISTURBANCES: NOT PRESENT
AGITATION: SOMEWHAT MORE THAN NORMAL ACTIVITY
ANXIETY: 2
PAROXYSMAL SWEATS: NO SWEAT VISIBLE
AUDITORY DISTURBANCES: MODERATELY SEVERE HALLUCINATIONS
ANXIETY: MILDLY ANXIOUS
ANXIETY: 2
NAUSEA AND VOMITING: NO NAUSEA AND NO VOMITING
AGITATION: SOMEWHAT MORE THAN NORMAL ACTIVITY
AGITATION: 2
HEADACHE, FULLNESS IN HEAD: NOT PRESENT
PAROXYSMAL SWEATS: NO SWEAT VISIBLE
HEADACHE, FULLNESS IN HEAD: NOT PRESENT
TREMOR: NO TREMOR
TREMOR: NO TREMOR
NAUSEA AND VOMITING: NO NAUSEA AND NO VOMITING
HEADACHE, FULLNESS IN HEAD: NOT PRESENT
VISUAL DISTURBANCES: MILD SENSITIVITY
TREMOR: NO TREMOR
VISUAL DISTURBANCES: NOT PRESENT
TREMOR: NO TREMOR
HEADACHE, FULLNESS IN HEAD: NOT PRESENT
VISUAL DISTURBANCES: MILD SENSITIVITY
TREMOR: NO TREMOR
ORIENTATION AND CLOUDING OF SENSORIUM: ORIENTED AND CAN DO SERIAL ADDITIONS
PAROXYSMAL SWEATS: NO SWEAT VISIBLE
HEADACHE, FULLNESS IN HEAD: NOT PRESENT
AUDITORY DISTURBANCES: NOT PRESENT
VISUAL DISTURBANCES: NOT PRESENT
NAUSEA AND VOMITING: NO NAUSEA AND NO VOMITING
ORIENTATION AND CLOUDING OF SENSORIUM: DISORIENTED FOR DATA BY NO MORE THAN 2 CALENDAR DAYS
VISUAL DISTURBANCES: NOT PRESENT
TOTAL SCORE: 14
ORIENTATION AND CLOUDING OF SENSORIUM: ORIENTED AND CAN DO SERIAL ADDITIONS
AUDITORY DISTURBANCES: NOT PRESENT
NAUSEA AND VOMITING: NO NAUSEA AND NO VOMITING
AGITATION: NORMAL ACTIVITY
TOTAL SCORE: 3
TOTAL SCORE: 5
HEADACHE, FULLNESS IN HEAD: MILD
ORIENTATION AND CLOUDING OF SENSORIUM: DISORIENTED FOR DATA BY NO MORE THAN 2 CALENDAR DAYS
PAROXYSMAL SWEATS: NO SWEAT VISIBLE
TOTAL SCORE: 4
AGITATION: MODERATELY FIDGETY AND RESTLESS
AUDITORY DISTURBANCES: NOT PRESENT
ANXIETY: MILDLY ANXIOUS

## 2023-10-13 ASSESSMENT — COGNITIVE AND FUNCTIONAL STATUS - GENERAL
MOVING FROM LYING ON BACK TO SITTING ON SIDE OF FLAT BED WITH BEDRAILS: A LITTLE
MOVING TO AND FROM BED TO CHAIR: TOTAL
DRESSING REGULAR UPPER BODY CLOTHING: A LOT
TURNING FROM BACK TO SIDE WHILE IN FLAT BAD: TOTAL
STANDING UP FROM CHAIR USING ARMS: TOTAL
DRESSING REGULAR LOWER BODY CLOTHING: A LOT
WALKING IN HOSPITAL ROOM: TOTAL
TOILETING: A LOT
HELP NEEDED FOR BATHING: A LOT
DAILY ACTIVITIY SCORE: 14
MOBILITY SCORE: 8
PERSONAL GROOMING: A LITTLE
EATING MEALS: A LITTLE
CLIMB 3 TO 5 STEPS WITH RAILING: TOTAL

## 2023-10-13 ASSESSMENT — PAIN SCALES - GENERAL
PAINLEVEL_OUTOF10: 0 - NO PAIN
PAINLEVEL_OUTOF10: 0 - NO PAIN
PAINLEVEL_OUTOF10: 2
PAINLEVEL_OUTOF10: 0 - NO PAIN
PAINLEVEL_OUTOF10: 0 - NO PAIN
PAINLEVEL_OUTOF10: 3
PAINLEVEL_OUTOF10: 0 - NO PAIN

## 2023-10-13 ASSESSMENT — ENCOUNTER SYMPTOMS
SHORTNESS OF BREATH: 0
ABDOMINAL PAIN: 0
CHEST TIGHTNESS: 0
NAUSEA: 0

## 2023-10-13 ASSESSMENT — PAIN DESCRIPTION - DESCRIPTORS: DESCRIPTORS: SORE

## 2023-10-13 ASSESSMENT — ACTIVITIES OF DAILY LIVING (ADL): HOME_MANAGEMENT_TIME_ENTRY: 12

## 2023-10-13 NOTE — PROGRESS NOTES
Occupational Therapy    Occupational Therapy Treatment    Name: Kathleen Hamman  MRN: [id number]  : 1960  Date: 10/13/23  Time Calculation  Start Time: 1225  Stop Time: 1250  Time Calculation (min): 25 min    Assessment:  OT Assessment: Pt requires skilled OT services to address identified deficits with emphasis on adaptive techniques, compensatory strategies, and environmental modifications in order to facilitate a safe return home.  Prognosis: Excellent  Evaluation/Treatment Tolerance: Patient limited by fatigue, Patient limited by pain  Medical Staff Made Aware: Yes  Plan:  Treatment Interventions: ADL retraining, Functional transfer training, UE strengthening/ROM, Endurance training, Cognitive reorientation, Fine motor coordination activities, Continued evaluation (emotional health)  OT Frequency: 4 times per week  OT Discharge Recommendations: High intensity level of continued care  OT - OK to Discharge: Yes (OT eval complete and d/c rec made)    Subjective   General:  OT Last Visit  OT Received On: 10/13/23  General  Reason for Referral: Patient sent by helicopter from [facility] for bradycardia following verapamil intoxication, s/p TVP (removed), was intubated now extubated (10/10)  Past Medical History Relevant to Rehab: no PMHx  Family/Caregiver Present: No  Co-Treatment: PT  Co-Treatment Reason: co-session to progress mobility and safety d/t impaired cognition and impaired activity tolerance with mobility AMPAC <10  Prior to Session Communication: Bedside nurse  Patient Position Received: Bed, 3 rail up, Alarm off, caregiver present (RUE wrist restraint secured; at end of session pt returned to position discovered.)  General Comment: fall and cardiac precautions. pt confused and lethargic throughout. pt oriented to self and month only; command follow >90% to one-step commands with increased cues. pt with increased work of breathing and hypertensive during activity - RN aware.  Vitals:  Vital  Signs  Heart Rate: 91 (post 104)  Heart Rate Source: Monitor  Resp: (!) 29 (post 26)  SpO2: 100 % (during activity 100, post 100)  BP: (!) 168/105 (during activity 170s/110s, post 167/109)  MAP (mmHg): 123 (post 125)  BP Location: Right arm  BP Method: Automatic  Patient Position: Lying  Pain Assessment:  Pain Assessment  Pain Assessment:  (pt denied pain)     Objective   Activities of Daily Living:    UE Dressing  UE Dressing Level of Assistance: Maximum assistance, Setup, Maximum verbal cues, Tactile cues  UE Dressing Where Assessed: Bed level  UE Dressing Comments: pt able to minimally present BUE for threading for doff/don of hospital gown with assist at buttons/ties    LE Dressing  LE Dressing: Yes  Sock Level of Assistance: Dependent  LE Dressing Where Assessed: Bed level  LE Dressing Comments: don/doff socks    Toileting  Toileting Level of Assistance: Dependent, Tactile cues, Maximum verbal cues  Where Assessed: Bed level  Toileting Comments: pt discovered incontinent of urine while supine, required Total assist for hygiene and linen change via rolling R/L in bed with maximal cues for positioning, direction follow, and reassurance.    Bed Mobility/Transfers:   Bed Mobility  Bed Mobility: Yes  Bed Mobility 1  Bed Mobility 1: Rolling left, Rolling right  Level of Assistance 1: Contact guard, Maximum verbal cues, Maximum tactile cues  Bed Mobility Comments 1: bed rails  Bed Mobility 2  Bed Mobility  2: Supine to sitting, Sitting to supine  Level of Assistance 2: Maximum assistance, Maximum verbal cues, Maximum tactile cues (x2 assist)  Bed Mobility Comments 2: cues for technique, direction follow and safety (HOB elevated in sup to sit, HOB flat in sit to sup)    Transfers  Transfer: Yes  Transfer 1  Transfer From 1:  (sit<>stand)  Transfer Device 1:  (B arm-in-arm assist, BLE blocked, draw sheet at hips)  Transfer Level of Assistance 1: Moderate assistance, Moderate tactile cues, Moderate verbal cues (x2  assist)  Trials/Comments 1: pt with retro lean throught, decreased eccentric control and poor safety awareness with cues for safety, direction follow, and technique; easily fatigued    Therapy/Activity:   Therapeutic Activity  Therapeutic Activity Performed: Yes  Therapeutic Activity 1: Pt sat EOB ~5 minutes with impulsivity, decreased awareness, retro lean and occassional anterior block to prevent sliding; CGA throughout. Pt tolerated static stand ~20 seconds with Mod A x2 demonstrating retro lean.     Outcome Measures:  [facility] Daily Activity  Putting on and taking off regular lower body clothing: A lot  Bathing (including washing, rinsing, drying): A lot  Putting on and taking off regular upper body clothing: A lot  Toileting, which includes using toilet, bedpan or urinal: A lot  Taking care of personal grooming such as brushing teeth: A little  Eating Meals: A little  Daily Activity - Total Score: 14    Confusion Assessment Method-ICU (CAM-ICU)  Feature 1: Acute Onset or Fluctuating Course: Positive  Feature 2: Inattention: Positive  Feature 3: Altered Level of Consciousness: Positive  Feature 4: Disorganized Thinking: Positive  Overall CAM-ICU: Positive    ICU Mobility Screen  Early Mobility/Exercise Safety Screen:  (ICU Mobility Scale: 4)    Education Documentation  Body Mechanics, taught by Aisha Arroyo OT at 10/13/2023  4:06 PM.  Learner: Patient  Readiness: Acceptance  Method: Explanation, Demonstration  Response: Needs Reinforcement    Precautions, taught by Aisha Arroyo OT at 10/13/2023  4:06 PM.  Learner: Patient  Readiness: Acceptance  Method: Explanation, Demonstration  Response: Needs Reinforcement    ADL Training, taught by Aisha Arroyo OT at 10/13/2023  4:06 PM.  Learner: Patient  Readiness: Acceptance  Method: Explanation, Demonstration  Response: Needs Reinforcement    Education Comments  No comments found.      Goals:  Encounter Problems       Encounter Problems (Active)       ADLs        Pt will complete LB dressing with CGA and minimal verbal cues for safety in unsupported sitting as needed. (Progressing)       Start:  10/10/23    Expected End:  10/24/23            Pt will complete toileting including hygiene and clothing management with CGA using LRD as needed.  (Progressing)       Start:  10/10/23    Expected End:  10/24/23               BALANCE       Pt will perform ADL while reaching outside GLENROY and returning self to midline >8 minutes with SBA and minimal verbal cues for safety. (Progressing)       Start:  10/10/23    Expected End:  10/24/23                   COGNITION/SAFETY       Pt will score WFL on >/= 2 standardized cognitive assessments to increased safety and independence with I/ADL tasks. (Progressing)       Start:  10/10/23    Expected End:  10/24/23               EMOTIONAL HEALTH       Pt will demonstrate >/= 2 coping strategies in preparation for functional mobility and/or ADLs to enhance independence and safety. (Progressing)       Start:  10/10/23    Expected End:  10/24/23               MOBILITY       Pt will perform household distance functional mobility with CGA using LRD as needed and minimal verbal cues for safety. (Progressing)       Start:  10/10/23    Expected End:  10/24/23               TRANSFERS       Pt will complete supine<>sit transfers with CGA and minimal verbal cues for safety. (Progressing)       Start:  10/10/23    Expected End:  10/24/23            Pt will complete functional transfers from bed, chair, and/or toilet with CGA and minimal verbal cues for safety. (Progressing)       Start:  10/10/23    Expected End:  10/24/23

## 2023-10-13 NOTE — NURSING NOTE
Patient hypertensive, 160s/90s & patient having labored breathing. O2 saturation 100% without oxygen. MD Gordon notified.

## 2023-10-13 NOTE — PROGRESS NOTES
"Kathleen Hamman is a 62 y.o. female on day 4 of admission presenting with Bradycardia.    Subjective   Patient off precedex gtt since yesterday. She is Aox2 but makes some nonsensical statements such as \"I'm not staying at the three [facility] for four more hours\".     Hospital Course:   62 year old female transported from [facility] to CICU on 10/9 morning for bradycardia needing transcutaneous pacing for verapamil intoxication. She arrived on Levophed @0.02, vasopressin 0.03, and insulin gtt @56 units (for inotropic effects iso CCB overdose). On arrival to CICU, patient immediately taken to cath lab for TVP placement. Poison control had been contacted at [facility] and followed patient's case while in CICU. Med tox at  also consulted. Patient noted to have transaminitis likely due to shock liver, and was given NAC until LFTs significantly reduced. Patient was in native rhythm not requiring pacing. All lines including TVP removed 10/9 afternoon. Patient extubated morning of 10/10 and currently on 2L NC. Patient has had some agitation/anxiety requiring precedex drip intermittently in CICU, which was weaned off 10/12 am. Patient found to have thrombocytopenia in the 60s of unclear etiology. HIV negative and hep C negative, no signs of bleeding or hemolysis. Liver US was attempted but patient refused exam. Patient has daily alcohol use history at least two glasses wine daily with full history unknown. Pt was on CIWA while in unit with scores initially up to 17. Pt not showing signs of withdrawal so CIWA discontinued. Patient denied SI on evaluation in the CICU and states she had been out of her carvedilol for a week so had been taking extra doses of her verapamil and losartan. Patient does have a psych history per  which patient denies so psych was consulted. Patient is not currently taking any psychiatric medications or seeing a psych provider.        Objective   Review of Systems   Respiratory:  Negative " "for chest tightness and shortness of breath.    Cardiovascular:  Negative for chest pain.   Gastrointestinal:  Negative for abdominal pain and nausea.        Physical Exam  Constitutional:       General: She is not in acute distress.     Appearance: She is not diaphoretic.      Comments: drowsy   HENT:      Head: Normocephalic and atraumatic.      Nose: Nose normal.   Eyes:      General: No scleral icterus.     Extraocular Movements: Extraocular movements intact.      Conjunctiva/sclera: Conjunctivae normal.      Pupils: Pupils are equal, round, and reactive to light.   Cardiovascular:      Rate and Rhythm: Normal rate and regular rhythm.   Pulmonary:      Effort: Pulmonary effort is normal. No respiratory distress.      Breath sounds: Normal breath sounds. No wheezing or rhonchi.      Comments: On 2L NC  Abdominal:      General: Bowel sounds are normal. There is no distension.      Palpations: Abdomen is soft.      Tenderness: There is no abdominal tenderness. There is no guarding.   Musculoskeletal:      Right lower leg: No edema.      Left lower leg: No edema.   Skin:     General: Skin is warm and dry.   Neurological:      Comments: Oriented to person, date, states she's \"somewhere in [place]\", normal speech   Psychiatric:      Comments: Minimally engages, makes some tangential statements         Last Recorded Vitals  Blood pressure (!) 149/93, pulse (!) 113, temperature 36.7 °C (98.1 °F), resp. rate 20, height 1.702 m (5' 7\"), weight 60.4 kg (133 lb 2.5 oz), SpO2 100 %.  Intake/Output last 3 Shifts:  I/O last 3 completed shifts:  In: 650.9 (10.8 mL/kg) [I.V.:150.9 (2.5 mL/kg); IV Piggyback:500]  Out: 1600 (26.5 mL/kg) [Urine:1300 (0.6 mL/kg/hr); Emesis/NG output:300]  Weight: 60.4 kg     Relevant Results           This patient currently has cardiac telemetry ordered; if you would like to modify or discontinue the telemetry order, click here to go to the orders activity to modify/discontinue the order.  Scheduled " medications  enoxaparin, 40 mg, subcutaneous, Daily  folic acid, 1 mg, oral, Daily  lidocaine, 1 patch, transdermal, Daily  losartan, 50 mg, oral, Daily  melatonin, 5 mg, oral, Nightly  multivitamin with minerals, 1 tablet, oral, Daily  multivitamin with minerals, 1 tablet, oral, Daily  perflutren protein A microsphere, 0.5 mL, intravenous, Once in imaging  polyethylene glycol, 17 g, oral, Daily  sennosides, 1 tablet, oral, BID  sulfur hexafluoride microsphr, 2 mL, intravenous, Once in imaging  thiamine, 500 mg, intravenous, TID      Continuous medications     PRN medications  PRN medications: acetaminophen, dextrose, glucagon, LORazepam **OR** LORazepam **OR** LORazepam, OLANZapine, oxyCODONE, oxygen, trimethobenzamide     Results for orders placed or performed during the hospital encounter of 10/09/23 (from the past 24 hour(s))   Hepatitis C antibody   Result Value Ref Range    Hepatitis C AB Nonreactive Nonreactive                  Assessment/Plan   Principal Problem:    Bradycardia    62 year old [facility]adycardia 2/2 to CCB intoxication with verapamil. Patient is clinically significantly improved from a cardiovascular standpoint. TVP and other lines removed 10/10. Unclear if her mental status is near her baseline given her psychiatric history versus related to her acute process.        Neuro/Psych  #Acute toxic encephalopathy   #Anxiety  -weaned off precedex gtt 10/12 am  -pt denies SI  -Psych signed off 10/12, pt not engaging        CV  #Bradycardia, resolved  #CCB intoxication  #Cardiogenic shock, resolved  -off pressors since 10/9  -Med tox following  -PRN zyprexa at bedtime    -Melatonin nightly     Resp  #s/p intubation for airway protection   -RSI intubation @ [facility]  -extubated to NC around 11 am 10/10  -on 2L NC     GI  #Transaminitis likely 2/2 shock liver, improving   #Etoh use history  -s/p NAC, LFTs significantly downtrended  -WA protocol  discontinued, not exhibiting signs of withdrawal   -Liver US, not completed as pt refused     Endo  -no active issues     Renal  #DARIUS, resolved  #Hyponatremia likely 2/2 to hyperglycemia v etoh use history    #Hypokalemia  #Hypomagnesemia  #Lactic acidosis, resolved  -Monitor and replete electrolytes as needed  -lactate 6.3 on admission, since normalized   -s/p 1L LR on admission      Heme Onc  #Thrombocytopenia, unclear etiology   -plts stable in 60s  -Monitor, no s/s of bleeding  -HIV negative, Hep C negative   -Liver US not completed as pt refused     N: regular   DVT ppx: lovenox   A: R TVP, R radial A line, R fem A line, R fem swan (all lines removed 10/10)  Code Status: Full Code  NOK:  Eliseo [phone]                          Katherine Hurst MD

## 2023-10-13 NOTE — PROGRESS NOTES
Kathleen Hamman is a 62 y.o. female on day 4 of admission presenting with Bradycardia.      Subjective   Patient with some increased confusion this am. Denies other complaints but unable to participate fully in hpi/ros       Objective     Last Recorded Vitals  BP (!) 149/93   Pulse (!) 113   Temp 36.4 °C (97.5 °F) (Temporal)   Resp 20   Wt 60.4 kg (133 lb 2.5 oz)   SpO2 100%   Intake/Output last 3 Shifts:    Intake/Output Summary (Last 24 hours) at 10/13/2023 0958  Last data filed at 10/13/2023 0600  Gross per 24 hour   Intake 509.34 ml   Output 1150 ml   Net -640.66 ml       Admission Weight  Weight: 56.8 kg (125 lb 3.5 oz) (10/09/23 0900)    Daily Weight  10/13/23 : 60.4 kg (133 lb 2.5 oz)    Image Results  Electrophysiology procedure     [facility], Cath Lab, [address] [place] [address]    Cardiovascular Catheterization Report    Patient Name:      KATHLEEN HAMMAN      Performing Physician:  [id number] Guillen MD  Study Date:        10/9/2023            Verifying Physician:   [id number] Guillen MD  MRN/PID:           [id number]             Cardiologist/Co-scrub:  Accession#:        [id number]         Ordering Physician:    [id number] JO  Date of Birth/Age: 1960 / 62 years Fellow:                [id number] Jo MD  Gender:            F                    Fellow:                [id number] Daniela Berger MD  Encounter#:        [id number]       Study:            Pacemaker - Temporary  Additional Study: Right Heart Cath       Procedure Description:  After infiltration of local anesthetic, the right femoral vein was identified with two-dimensional  ultrasound. Under direct ultrasound visualization, the right femoral vein was cannulated with a micropuncture technique. A 9f sheath was placed in the vein. After infiltration of local anesthetic, The right internal jugular vein was identified with two-dimensional ultrasound. The right internal jugular vein was cannulated with a micropuncture technique. A 6 Romansh sheath was placed in the vein. Cardiac output was calculated via the Elke method.     Procedure Description Comments:  TVP sutured at 38cm in RIJ. Leave in swan sutured at 68 cm in RVF.     Right Heart Catheterization:  Cardiac output was calculated via the Elke method. Elevated left sided filling pressures with normal cardiac output. Cardiac output is normal. RA 15 , RV 36/12/17, PA 37/17/24, PCWP 18, CO/CI 3.7/2.5, PA sat 65%.       Hemo Personnel:  +----------------+---------+  Name            Duty       +----------------+---------+  Carlos Guillen MD 1  +----------------+---------+       Hemodynamic Pressures:     +----+----------+---------+-------------+-------------+---+----+-------+-------+  SiteDate Time   Phase  Systolic mmHg  Diastolic  ED MeanA-Wave V-Wave                   Name                    mmHg     mmHmmHg mmHg   mmHg                                                      g                     +----+----------+---------+-------------+-------------+---+----+-------+-------+   Art 10/9/2023     Rest          145           60     85                    9:44:08 AM                                                          +----+----------+---------+-------------+-------------+---+----+-------+-------+   Art 10/9/2023     Rest          148           62     88                    9:46:15 AM                                                          +----+----------+---------+-------------+-------------+---+----+-------+-------+   Art 10/9/2023     Rest          150            67     90                    9:48:53 AM                                                          +----+----------+---------+-------------+-------------+---+----+-------+-------+   Art 10/9/2023     Rest          151           66     91                    9:53:48 AM                                                          +----+----------+---------+-------------+-------------+---+----+-------+-------+   Art 10/9/2023     Rest          152           66     91                    9:55:24 AM                                                          +----+----------+---------+-------------+-------------+---+----+-------+-------+   Art 10/9/2023     Rest          150           65     90                    9:58:53 AM                                                          +----+----------+---------+-------------+-------------+---+----+-------+-------+    AO 10/9/2023     Rest          150           65      0                    9:58:53 AM                                                          +----+----------+---------+-------------+-------------+---+----+-------+-------+   Art 10/9/2023     Rest          149           62     82                      10:01:36                                                                      AM                                                          +----+----------+---------+-------------+-------------+---+----+-------+-------+    AO 10/9/2023     Rest            0            0      0                      10:01:36                                                                      AM                                                          +----+----------+---------+-------------+-------------+---+----+-------+-------+   Art 10/9/2023     Rest          153           66     91                      10:03:40                                                                       AM                                                          +----+----------+---------+-------------+-------------+---+----+-------+-------+    AO 10/9/2023     Rest            0            0      0                      10:03:40                                                                      AM                                                          +----+----------+---------+-------------+-------------+---+----+-------+-------+   Art 10/9/2023     Rest          154           66     92                      10:07:00                                                                      AM                                                          +----+----------+---------+-------------+-------------+---+----+-------+-------+    AO 10/9/2023     Rest            1            0      0                      10:07:00                                                                      AM                                                          +----+----------+---------+-------------+-------------+---+----+-------+-------+   Art 10/9/2023     Rest          156           69     93                      10:08:36                                                                      AM                                                          +----+----------+---------+-------------+-------------+---+----+-------+-------+    AO 10/9/2023     Rest            0            0      0                      10:08:36                                                                      AM                                                          +----+----------+---------+-------------+-------------+---+----+-------+-------+   Art 10/9/2023     Rest          158           68     94                       10:14:01                                                                      AM                                                          +----+----------+---------+-------------+-------------+---+----+-------+-------+    RA 10/9/2023     Rest                               15     18     17        10:14:01                                                                      AM                                                          +----+----------+---------+-------------+-------------+---+----+-------+-------+   Art 10/9/2023     Rest          153           66     86                      10:14:25                                                                      AM                                                          +----+----------+---------+-------------+-------------+---+----+-------+-------+    RV 10/9/2023     Rest           36           12 17                          10:14:25                                                                      AM                                                          +----+----------+---------+-------------+-------------+---+----+-------+-------+   Art 10/9/2023     Rest          157           67     92                      10:18:07                                                                      AM                                                          +----+----------+---------+-------------+-------------+---+----+-------+-------+    PW 10/9/2023     Rest                               18     21     21        10:18:07                                                                      AM                                                          +----+----------+---------+-------------+-------------+---+----+-------+-------+   Art 10/9/2023     Rest          159           69     95                       10:18:29                                                                      AM                                                          +----+----------+---------+-------------+-------------+---+----+-------+-------+    PA 10/9/2023     Rest           37           17     24                      10:18:29                                                                      AM                                                          +----+----------+---------+-------------+-------------+---+----+-------+-------+   Art 10/9/2023     Rest          164           69     96                      10:24:41                                                                      AM                                                          +----+----------+---------+-------------+-------------+---+----+-------+-------+         Oxygen Saturation %:  +-----------+----------+------------+  Sample SiteO2 Sat (%)HB (g/100ml)  +-----------+----------+------------+           FA       100        11.8  +-----------+----------+------------+           PA        65        11.8  +-----------+----------+------------+           FA       100        11.8  +-----------+----------+------------+           PA        65        11.8  +-----------+----------+------------+       Cardiac Outputs:  +---------------+------------------+-------+  ROSALEE CO (l/min)ROSALEE CI (l/min/m2)ROSALEE SV  +---------------+------------------+-------+              3.5               2.2   84.0  +---------------+------------------+-------+       Vascular Resistance Calculated Values (Wood Units):  +-----+---+----+---+----+  PhasePVRPVRITPRTPRI  +-----+---+----+---+----+  0    1.72.7 6.810.8  +-----+---+----+---+----+       Complications:  No in-lab complications observed.     Cardiac Cath Post Procedure Notes:  Post Procedure Diagnosis:  Uncomplicated RIJ TVP placement.                            Uncomplicated RHC with leave in swtony showing mildly                            elevated filling pressures.  Blood Loss:               Estimated blood loss during the procedure was 5 mls.  Specimens Removed:        Number of specimen(s) removed: none.    ____________________________________________________________________________________  CONCLUSIONS:    ICD 10 Codes:  Bradycardia, unspecified-R00.1     CPT Codes:  Insertion of temporary single chamber cardiac electrode or pacemaker catheter (separate procedure)-90323; Right Heart Cath O2/Cardiac output without biopsy (RHC)-13711; [place]vis[place] Zackery-69108; Moderate Sedation Services 1st additional 15 minutes patient >5 years-83258; Moderate Sedation Services 2nd additional 15 minutes patient >5 years-70505; Moderate Sedation Services 3rd additional 15 minutes patient >5 years-52373     [id number] Carlos Guillen MD  Performing Physician  Electronically signed by [id number] Carlos Guillen MD on 10/12/2023 at 1:38:16 PM         ** Final **      Physical Exam  Vitals and nursing note reviewed.   Constitutional:       General: She is not in acute distress.     Appearance: Normal appearance. She is not ill-appearing, toxic-appearing or diaphoretic.   HENT:      Head: Normocephalic and atraumatic.      Nose: Nose normal.      Mouth/Throat:      Mouth: Mucous membranes are moist.   Eyes:      General: No scleral icterus.     Extraocular Movements: Extraocular movements intact.      Conjunctiva/sclera: Conjunctivae normal.      Pupils: Pupils are equal, round, and reactive to light.   Cardiovascular:      Rate and Rhythm: Normal rate and regular rhythm.      Pulses: Normal pulses.      Heart sounds: Normal heart sounds. No murmur heard.     No friction rub. No gallop.   Pulmonary:      Effort: Pulmonary effort is normal.      Breath sounds: Normal breath sounds. No stridor. No wheezing, rhonchi or rales.   Chest:      Chest wall: No  tenderness.   Abdominal:      General: Abdomen is flat. Bowel sounds are normal. There is no distension.      Palpations: Abdomen is soft. There is no mass.      Tenderness: There is no abdominal tenderness. There is no guarding or rebound.   Musculoskeletal:         General: No tenderness, deformity or signs of injury. Normal range of motion.      Cervical back: Normal range of motion and neck supple. No rigidity or tenderness.      Right lower leg: No edema.      Left lower leg: No edema.   Lymphadenopathy:      Cervical: No cervical adenopathy.   Skin:     General: Skin is warm and dry.      Capillary Refill: Capillary refill takes less than 2 seconds.      Findings: Bruising present. No erythema, lesion or rash.   Neurological:      General: No focal deficit present.      Mental Status: She is disoriented.      Cranial Nerves: No cranial nerve deficit.      Sensory: No sensory deficit.      Motor: No weakness.   Psychiatric:      Comments: Confused             Assessment/Plan           Principal Problem:    Bradycardia    This is a 63 y/o F currently admitted to the CICU after being found unresponsive, hypotensive and bradycardic. She is s/p intubation and TVP placement. Med tox was consulted for further recs     #respiratory failure  #acute toxic encephalopathy  #bradycardia  #hypotension  #hyperglycemia  #hyponatremia  #hypokalemia  #transaminitis        Recommendations for hypotension/bradycardia  -resolved, off of support  Recs for transaminitis  -completed NAC course without issue     Recs for elevated CIWA scores  -CIWA scores more consistent with delirium and not a GABAa agonist withdrawal  -off of dex drip  -sx consistent with a mixed picture delirium with ICU, msof, and possible withdrawal/untreated psychiatric illness  -continue thiamine/folate supplementation,  thiamine 500mg IV Q8 x3 days followed by 500mg PO/IV daily  -discontinue CIWA  -decrease dose of zyprexa to 2.5mg as needed  -delirium  precautions due to possible mixed picture of agitation       Recs were discussed with bedside nursing and the primary team.      Med tox will continue to follow, please contact through secure messaging for questions/concerns        I have personally spent 31 minutes of critical care time, exclusive of time spent on any procedures, in evaluation and management of this critically ill patient’s condition of delirium and possible withdrawal. I provided the following critical care treatment: assessment, documentation, taking additional history, discussion with primary team and nursing, management of antidotes.     Bridgette Eckert, DO Bridgette Eckert, DO

## 2023-10-13 NOTE — PROGRESS NOTES
Physical Therapy    Physical Therapy Treatment    Patient Name: Kathleen Hamman  MRN: [id number]  Today's Date: 10/13/2023  In Time: 12:24  Out Time: 12:49  SPT under direct supervision of PT         Assessment/Plan   PT Assessment  PT Assessment Results: Decreased strength, Decreased range of motion, Decreased endurance, Impaired balance, Decreased mobility, Decreased coordination, Decreased cognition, Impaired judgement, Decreased safety awareness  Rehab Prognosis: Good  Evaluation/Treatment Tolerance: Patient limited by fatigue  Medical Staff Made Aware: Yes  End of Session Communication: Bedside nurse  End of Session Patient Position: Bed, 3 rail up, Alarm off, caregiver present (R wrist restraint)  PT Plan  Inpatient/Swing Bed or Outpatient: Inpatient  PT Plan  Treatment/Interventions: Bed mobility, Transfer training, Gait training, Balance training, Neuromuscular re-education, Strengthening, Endurance training, Range of motion, Therapeutic exercise, Therapeutic activity, Home exercise program  PT Plan: Skilled PT  PT Frequency: 4 times per week  PT Discharge Recommendations: High intensity level of continued care  PT Recommended Transfer Status: Assist x2  PT - OK to Discharge: Yes      General Visit Information:   PT  Visit  PT Received On: 10/13/23  Response to Previous Treatment: Patient with no complaints from previous session.  General  Reason for Referral: Patient sent by helicopter from [facility] for bradycardia following verapamil intoxication, s/p TVP (removed), was intubated now extubated (10/10)  Past Medical History Relevant to Rehab: no PMHx  Family/Caregiver Present: No  Co-Treatment: OT  Co-Treatment Reason: 2/2 decreased activity tolerance  Prior to Session Communication: Bedside nurse  Patient Position Received: Bed, 3 rail up, Alarm off, caregiver present (R wrist restraint)  General Comment: Pt supine upon arrival and lethargic. Sitter present. Pt was agreeable to therapies and presented with  "labored breathing and unproductive cough throughout. Pt was hypertensive in all positions. RN aware. (1L O2, external catheter)    Subjective   Precautions:  Precautions  Medical Precautions: Cardiac precautions, Fall precautions  Vital Signs:  Vital Signs  Heart Rate:  (PRE: 92 DURIN POST: 104)  Resp:  (PRE: 31 DURING: in the 30s throughout POST: 34)  SpO2:  (PRE: 100 DURIN POST: 100)  BP:  (PRE: 168/105 supine DURIN/113, 173/106 sitting POST: 176/107, 167/109 supine)  MAP (mmHg):  (PRE:123 DURING: >120 POST: 127, 125)  BP Location: Right arm  BP Method: Automatic    Objective     Pain:  Pain Assessment  Pain Assessment:  (pt reports that she is \"miserable from coughing\" but denies pain anywhere else. Pt denies headache at this time.)  Pain Score: 8  Pain Type: Acute pain  Pain Location:  (Stomach)  Pain Descriptors: Aching  Pain Frequency: Intermittent (increased with mobility)  Effect of Pain on Daily Activities: Pain limits pt's ability to perform bed mobility and transfers    Cognition:  Cognition  Overall Cognitive Status: Impaired  Arousal/Alertness:  (pt had eyes cosed throughout session and was lethargic but still responded to stimuli)  Orientation Level: Disoriented to time, Disoriented to place  Following Commands: Follows one step commands with increased time  Safety Judgment:  (Pt had a better understanding of her limitations today but continued to be anxious with mobility)  Cognition Comments: Pt thought that her  was in the room, that she was in a SNF, and that it was 1993. When asked to clarify these, she repeated the same answers. Pt re-oriented to place and time. Pt had her eyes closed for the majority of the tx and needed cueing in order to open her eyes.    Postural Control:  Postural Control  Postural Control: Impaired  Trunk Control: retropulsive  Posture Comment: Pt was able to sit up with less assistance today    Activity Tolerance:  Activity Tolerance  Activity " Tolerance Comments: Increased BP with upright activities    Treatments:  Therapeutic Exercise  Therapeutic Exercise Performed: Yes  Therapeutic Exercise Activity 1: sit EOB for 5 min with CGA and no UE support (5 min)  Therapeutic Exercise Activity 2: static stand modA x2 for about 20 sec with B arm in arm assist (Very labored, wheezing breaths. Pt cued for pursed lip breathing. SpO2 100% throughout)  Therapeutic Exercise Activity 3: TREATMENT: Postural relaxation to address upper trap tightness 2/2 anxiety. x3 reps    Therapeutic Activity  Therapeutic Activity Performed: Yes  Therapeutic Activity 1: B ankle pumps x10 in supine  Therapeutic Activity 2: B LAQ in sitting x5 (CGA; no UE support)    Bed Mobility  Bed Mobility: Yes  Bed Mobility 1  Bed Mobility 1: Rolling left  Level of Assistance 1: Contact guard, Moderate verbal cues  Bed Mobility Comments 1: Use of bedrails  Bed Mobility 2  Bed Mobility  2: Rolling right  Level of Assistance 2: Contact guard, Moderate verbal cues  Bed Mobility Comments 2: use of bedrails  Bed Mobility 3  Bed Mobility 3: Supine sitting  Level of Assistance 3: Maximum assistance, Moderate verbal cues (x2)  Bed Mobility Comments 3: HOB elevated (use of draw sheet)  Bed Mobility 4  Bed Mobility 4: Sitting to supine  Level of Assistance 4: Maximum assistance, Moderate verbal cues (x2)  Bed Mobility Comments 4: use of draw sheet    Ambulation/Gait Training  Ambulation/Gait Training Performed: No (unable to progress 2/2 weakness)  Transfers  Transfer: Yes  Transfer 1  Transfer From 1: Sit to  Transfer to 1: Stand  Technique 1: Sit to stand  Transfer Level of Assistance 1: Arm in arm assistance, Moderate assistance, Moderate verbal cues (x2)  Trials/Comments 1: x1 trial; cues needed for anterior lean (pt with poor insight and continued to lean posteriorly leading up to stand. Pt was able to correct with cueing)  Transfers 2  Transfer From 2: Stand to  Transfer to 2: Sit  Technique 2: Sit to  stand  Transfer Level of Assistance 2: Arm in arm assistance, Moderate assistance, Moderate verbal cues (x2)  Trials/Comments 2: x1 trial    Outcome Measures:  [facility] Basic Mobility  Turning from your back to your side while in a flat bed without using bedrails: A little  Moving from lying on your back to sitting on the side of a flat bed without using bedrails: Total  Moving to and from bed to chair (including a wheelchair): Total  Standing up from a chair using your arms (e.g. wheelchair or bedside chair): Total  To walk in hospital room: Total  Climbing 3-5 steps with railing: Total  Basic Mobility - Total Score: 8    Confusion Assessment Method-ICU (CAM-ICU)  Feature 1: Acute Onset or Fluctuating Course: Positive  Feature 2: Inattention: Positive  Feature 3: Altered Level of Consciousness: Positive  Feature 4: Disorganized Thinking: Positive  Overall CAM-ICU: Positive    FSS-ICU  Ambulation: Unable to attempt due to weakness  Rolling: Minimal assistance (performs 75% or more of task)  Sitting: Minimal assistance (performs 75% or more of task)  Transfer Sit-to-Stand: Total assistance (performs 25% or requires another person)  Transfer Supine-to-Sit: Total assistance (performs 25% or requires another person)  Total Score: 10      E = Exercise and Early Mobility  Current Activity: Standing  San Agitation Sedation Scale  San [place]itation Sedation Scale (RASS): Drowsy    Education Documentation  Mobility Training, taught by ROHINI Lomas at 10/13/2023  3:44 PM.  Learner: Patient  Readiness: Acceptance  Method: Explanation, Demonstration  Response: Verbalizes Understanding    Education Comments  No comments found.        OP EDUCATION:  Education  Individual(s) Educated: Patient  Education Provided: Fall Risk  Patient/Caregiver Demonstrated Understanding: yes  Patient Response to Education: Patient/Caregiver Verbalized Understanding of Information    Encounter Problems       Encounter Problems (Active)        Balance       STG - Maintains dynamic standing balance with LRD with CGA (Progressing)       Start:  10/10/23    Expected End:  10/24/23            STG - Maintains independent dynamic sitting balance without upper extremity support (Progressing)       Start:  10/10/23    Expected End:  10/24/23               Mobility       STG - Patient will ambulate 250 ft with LRD and CGA (Progressing)       Start:  10/10/23    Expected End:  10/24/23               Transfers       STG - Patient will perform bed mobility independently with no UE support (Progressing)       Start:  10/10/23    Expected End:  10/24/23            STG - Patient will transfer sit to and from stand with LRD and CGA (Progressing)       Start:  10/10/23    Expected End:  10/24/23

## 2023-10-14 ENCOUNTER — APPOINTMENT (OUTPATIENT)
Dept: RADIOLOGY | Facility: HOSPITAL | Age: 63
DRG: 917 | End: 2023-10-14
Payer: COMMERCIAL

## 2023-10-14 LAB
ALBUMIN SERPL BCP-MCNC: 3.4 G/DL (ref 3.4–5)
ALBUMIN SERPL BCP-MCNC: 3.5 G/DL (ref 3.4–5)
ALP SERPL-CCNC: 101 U/L (ref 33–136)
ALP SERPL-CCNC: 103 U/L (ref 33–136)
ALT SERPL W P-5'-P-CCNC: 76 U/L (ref 7–45)
ALT SERPL W P-5'-P-CCNC: 91 U/L (ref 7–45)
AMMONIA PLAS-SCNC: 30 UMOL/L (ref 16–53)
ANION GAP BLDA CALCULATED.4IONS-SCNC: 6 MMO/L (ref 10–25)
ANION GAP BLDA CALCULATED.4IONS-SCNC: 7 MMO/L (ref 10–25)
ANION GAP SERPL CALC-SCNC: 12 MMOL/L (ref 10–20)
ANION GAP SERPL CALC-SCNC: 14 MMOL/L (ref 10–20)
APPEARANCE UR: ABNORMAL
AST SERPL W P-5'-P-CCNC: 34 U/L (ref 9–39)
AST SERPL W P-5'-P-CCNC: 41 U/L (ref 9–39)
BASE EXCESS BLDA CALC-SCNC: 3.4 MMOL/L (ref -2–3)
BASE EXCESS BLDA CALC-SCNC: 4.1 MMOL/L (ref -2–3)
BILIRUB SERPL-MCNC: 0.6 MG/DL (ref 0–1.2)
BILIRUB SERPL-MCNC: 0.6 MG/DL (ref 0–1.2)
BILIRUB UR STRIP.AUTO-MCNC: NEGATIVE MG/DL
BODY SURFACE AREA: 1.52 M2
BODY TEMPERATURE: 37 DEGREES CELSIUS
BODY TEMPERATURE: 37 DEGREES CELSIUS
BUN SERPL-MCNC: 10 MG/DL (ref 6–23)
BUN SERPL-MCNC: 8 MG/DL (ref 6–23)
CA-I BLDA-SCNC: 1.15 MMOL/L (ref 1.1–1.33)
CA-I BLDA-SCNC: 1.17 MMOL/L (ref 1.1–1.33)
CALCIUM SERPL-MCNC: 8.5 MG/DL (ref 8.6–10.6)
CALCIUM SERPL-MCNC: 8.9 MG/DL (ref 8.6–10.6)
CHLORIDE BLDA-SCNC: 93 MMOL/L (ref 98–107)
CHLORIDE BLDA-SCNC: 97 MMOL/L (ref 98–107)
CHLORIDE SERPL-SCNC: 92 MMOL/L (ref 98–107)
CHLORIDE SERPL-SCNC: 93 MMOL/L (ref 98–107)
CHLORIDE UR-SCNC: 92 MMOL/L
CHLORIDE/CREATININE (MMOL/G) IN URINE: 256 MMOL/G CREAT (ref 38–318)
CO2 SERPL-SCNC: 27 MMOL/L (ref 21–32)
CO2 SERPL-SCNC: 28 MMOL/L (ref 21–32)
COLOR UR: ABNORMAL
CREAT SERPL-MCNC: 0.44 MG/DL (ref 0.5–1.05)
CREAT SERPL-MCNC: 0.58 MG/DL (ref 0.5–1.05)
CREAT UR-MCNC: 35.9 MG/DL (ref 20–320)
GFR SERPL CREATININE-BSD FRML MDRD: >90 ML/MIN/1.73M*2
GFR SERPL CREATININE-BSD FRML MDRD: >90 ML/MIN/1.73M*2
GLUCOSE BLDA-MCNC: 143 MG/DL (ref 74–99)
GLUCOSE BLDA-MCNC: 234 MG/DL (ref 74–99)
GLUCOSE SERPL-MCNC: 149 MG/DL (ref 74–99)
GLUCOSE SERPL-MCNC: 158 MG/DL (ref 74–99)
GLUCOSE UR STRIP.AUTO-MCNC: NEGATIVE MG/DL
HCO3 BLDA-SCNC: 25.9 MMOL/L (ref 22–26)
HCO3 BLDA-SCNC: 29.7 MMOL/L (ref 22–26)
HCT VFR BLD EST: 29 % (ref 36–46)
HCT VFR BLD EST: 34 % (ref 36–46)
HGB BLDA-MCNC: 11.2 G/DL (ref 12–16)
HGB BLDA-MCNC: 9.6 G/DL (ref 12–16)
HYALINE CASTS #/AREA URNS AUTO: ABNORMAL /LPF
INHALED O2 CONCENTRATION: 40 %
INHALED O2 CONCENTRATION: 50 %
KETONES UR STRIP.AUTO-MCNC: NEGATIVE MG/DL
LACTATE BLDA-SCNC: 0.6 MMOL/L (ref 0.4–2)
LACTATE BLDA-SCNC: 0.8 MMOL/L (ref 0.4–2)
LEUKOCYTE ESTERASE UR QL STRIP.AUTO: ABNORMAL
MAGNESIUM SERPL-MCNC: 1.9 MG/DL (ref 1.6–2.4)
NITRITE UR QL STRIP.AUTO: NEGATIVE
OSMOLALITY SERPL: 270 MOSM/KG (ref 280–300)
OSMOLALITY UR: 374 MOSM/KG (ref 200–1200)
OXYHGB MFR BLDA: 96.8 % (ref 94–98)
OXYHGB MFR BLDA: 97.2 % (ref 94–98)
PCO2 BLDA: 31 MM HG (ref 38–42)
PCO2 BLDA: 48 MM HG (ref 38–42)
PH BLDA: 7.4 PH (ref 7.38–7.42)
PH BLDA: 7.53 PH (ref 7.38–7.42)
PH UR STRIP.AUTO: 7 [PH]
PO2 BLDA: 120 MM HG (ref 85–95)
PO2 BLDA: 124 MM HG (ref 85–95)
POTASSIUM BLDA-SCNC: 3.5 MMOL/L (ref 3.5–5.3)
POTASSIUM BLDA-SCNC: 4 MMOL/L (ref 3.5–5.3)
POTASSIUM SERPL-SCNC: 3.6 MMOL/L (ref 3.5–5.3)
POTASSIUM SERPL-SCNC: 5 MMOL/L (ref 3.5–5.3)
POTASSIUM UR-SCNC: 37 MMOL/L
POTASSIUM/CREAT UR-RTO: 103 MMOL/G CREAT
PROT SERPL-MCNC: 5.7 G/DL (ref 6.4–8.2)
PROT SERPL-MCNC: 5.9 G/DL (ref 6.4–8.2)
PROT UR STRIP.AUTO-MCNC: ABNORMAL MG/DL
RBC # UR STRIP.AUTO: ABNORMAL /UL
RBC #/AREA URNS AUTO: ABNORMAL /HPF
SAO2 % BLDA: 100 % (ref 94–100)
SAO2 % BLDA: 97 % (ref 94–100)
SODIUM BLDA-SCNC: 125 MMOL/L (ref 136–145)
SODIUM BLDA-SCNC: 126 MMOL/L (ref 136–145)
SODIUM SERPL-SCNC: 128 MMOL/L (ref 136–145)
SODIUM SERPL-SCNC: 129 MMOL/L (ref 136–145)
SODIUM UR-SCNC: 108 MMOL/L
SODIUM/CREAT UR-RTO: 301 MMOL/G CREAT
SP GR UR STRIP.AUTO: 1.01
SQUAMOUS #/AREA URNS AUTO: ABNORMAL /HPF
T4 FREE SERPL-MCNC: 1.01 NG/DL (ref 0.78–1.48)
TSH SERPL-ACNC: 3.13 MIU/L (ref 0.44–3.98)
UROBILINOGEN UR STRIP.AUTO-MCNC: 2 MG/DL
WBC #/AREA URNS AUTO: >50 /HPF
WBC CLUMPS #/AREA URNS AUTO: ABNORMAL /HPF

## 2023-10-14 PROCEDURE — 94002 VENT MGMT INPAT INIT DAY: CPT

## 2023-10-14 PROCEDURE — S0166 INJ OLANZAPINE 2.5MG: HCPCS

## 2023-10-14 PROCEDURE — A4217 STERILE WATER/SALINE, 500 ML: HCPCS

## 2023-10-14 PROCEDURE — 96372 THER/PROPH/DIAG INJ SC/IM: CPT

## 2023-10-14 PROCEDURE — 2500000001 HC RX 250 WO HCPCS SELF ADMINISTERED DRUGS (ALT 637 FOR MEDICARE OP)

## 2023-10-14 PROCEDURE — 84132 ASSAY OF SERUM POTASSIUM: CPT | Performed by: STUDENT IN AN ORGANIZED HEALTH CARE EDUCATION/TRAINING PROGRAM

## 2023-10-14 PROCEDURE — 93005 ELECTROCARDIOGRAM TRACING: CPT

## 2023-10-14 PROCEDURE — 1200000002 HC GENERAL ROOM WITH TELEMETRY DAILY

## 2023-10-14 PROCEDURE — 74018 RADEX ABDOMEN 1 VIEW: CPT | Mod: FY

## 2023-10-14 PROCEDURE — 2500000004 HC RX 250 GENERAL PHARMACY W/ HCPCS (ALT 636 FOR OP/ED)

## 2023-10-14 PROCEDURE — 81001 URINALYSIS AUTO W/SCOPE: CPT

## 2023-10-14 PROCEDURE — 87186 SC STD MICRODIL/AGAR DIL: CPT | Mod: CMCLAB

## 2023-10-14 PROCEDURE — 84439 ASSAY OF FREE THYROXINE: CPT | Mod: CMCLAB

## 2023-10-14 PROCEDURE — 36415 COLL VENOUS BLD VENIPUNCTURE: CPT

## 2023-10-14 PROCEDURE — 2580000001 HC RX 258 IV SOLUTIONS

## 2023-10-14 PROCEDURE — 93971 EXTREMITY STUDY: CPT | Performed by: RADIOLOGY

## 2023-10-14 PROCEDURE — 37799 UNLISTED PX VASCULAR SURGERY: CPT | Mod: CMCLAB | Performed by: STUDENT IN AN ORGANIZED HEALTH CARE EDUCATION/TRAINING PROGRAM

## 2023-10-14 PROCEDURE — 83930 ASSAY OF BLOOD OSMOLALITY: CPT | Mod: CMCLAB

## 2023-10-14 PROCEDURE — 84443 ASSAY THYROID STIM HORMONE: CPT | Mod: CMCLAB

## 2023-10-14 PROCEDURE — 82140 ASSAY OF AMMONIA: CPT

## 2023-10-14 PROCEDURE — 83935 ASSAY OF URINE OSMOLALITY: CPT

## 2023-10-14 PROCEDURE — 74018 RADEX ABDOMEN 1 VIEW: CPT | Performed by: RADIOLOGY

## 2023-10-14 PROCEDURE — 80053 COMPREHEN METABOLIC PANEL: CPT | Mod: CMCLAB | Performed by: STUDENT IN AN ORGANIZED HEALTH CARE EDUCATION/TRAINING PROGRAM

## 2023-10-14 PROCEDURE — 93970 EXTREMITY STUDY: CPT

## 2023-10-14 PROCEDURE — 82436 ASSAY OF URINE CHLORIDE: CPT

## 2023-10-14 PROCEDURE — 37799 UNLISTED PX VASCULAR SURGERY: CPT | Mod: CMCLAB

## 2023-10-14 PROCEDURE — 99233 SBSQ HOSP IP/OBS HIGH 50: CPT | Performed by: EMERGENCY MEDICINE

## 2023-10-14 PROCEDURE — 99291 CRITICAL CARE FIRST HOUR: CPT

## 2023-10-14 PROCEDURE — 2500000005 HC RX 250 GENERAL PHARMACY W/O HCPCS

## 2023-10-14 PROCEDURE — 83605 ASSAY OF LACTIC ACID: CPT | Mod: CMCLAB | Performed by: STUDENT IN AN ORGANIZED HEALTH CARE EDUCATION/TRAINING PROGRAM

## 2023-10-14 RX ORDER — MIDAZOLAM IN NACL, ISO-OSMOTIC 100MG/0.1L
0-20 PLASTIC BAG, INJECTION (ML) INTRAVENOUS CONTINUOUS
Status: DISCONTINUED | OUTPATIENT
Start: 2023-10-14 | End: 2023-10-16

## 2023-10-14 RX ORDER — HYDROXYZINE HYDROCHLORIDE 25 MG/1
25 TABLET, FILM COATED ORAL EVERY 6 HOURS PRN
Status: DISCONTINUED | OUTPATIENT
Start: 2023-10-14 | End: 2023-10-16

## 2023-10-14 RX ORDER — MAGNESIUM SULFATE HEPTAHYDRATE 40 MG/ML
2 INJECTION, SOLUTION INTRAVENOUS ONCE
Status: COMPLETED | OUTPATIENT
Start: 2023-10-14 | End: 2023-10-14

## 2023-10-14 RX ORDER — POLYETHYLENE GLYCOL 3350 17 G/17G
17 POWDER, FOR SOLUTION ORAL DAILY
Status: DISCONTINUED | OUTPATIENT
Start: 2023-10-14 | End: 2023-10-18 | Stop reason: HOSPADM

## 2023-10-14 RX ORDER — POTASSIUM CHLORIDE 14.9 MG/ML
20 INJECTION INTRAVENOUS
Status: COMPLETED | OUTPATIENT
Start: 2023-10-14 | End: 2023-10-14

## 2023-10-14 RX ORDER — MIDAZOLAM HYDROCHLORIDE 1 MG/ML
4 INJECTION INTRAMUSCULAR; INTRAVENOUS ONCE
Status: COMPLETED | OUTPATIENT
Start: 2023-10-14 | End: 2023-10-14

## 2023-10-14 RX ORDER — ROCURONIUM BROMIDE 10 MG/ML
1.65 INJECTION, SOLUTION INTRAVENOUS ONCE
Status: COMPLETED | OUTPATIENT
Start: 2023-10-14 | End: 2023-10-14

## 2023-10-14 RX ORDER — VANCOMYCIN HYDROCHLORIDE 750 MG/150ML
15 INJECTION, SOLUTION INTRAVENOUS ONCE
Status: DISCONTINUED | OUTPATIENT
Start: 2023-10-14 | End: 2023-10-14

## 2023-10-14 RX ORDER — OLANZAPINE 10 MG/2ML
5 INJECTION, POWDER, FOR SOLUTION INTRAMUSCULAR EVERY 6 HOURS PRN
Status: DISCONTINUED | OUTPATIENT
Start: 2023-10-14 | End: 2023-10-17

## 2023-10-14 RX ORDER — WATER 1000 ML/1000ML
INJECTION, SOLUTION INTRAVENOUS
Status: COMPLETED
Start: 2023-10-14 | End: 2023-10-14

## 2023-10-14 RX ORDER — VANCOMYCIN HYDROCHLORIDE 750 MG/150ML
15 INJECTION, SOLUTION INTRAVENOUS EVERY 12 HOURS
Status: DISCONTINUED | OUTPATIENT
Start: 2023-10-14 | End: 2023-10-15

## 2023-10-14 RX ORDER — VANCOMYCIN HYDROCHLORIDE 750 MG/150ML
15 INJECTION, SOLUTION INTRAVENOUS EVERY 12 HOURS
Status: DISCONTINUED | OUTPATIENT
Start: 2023-10-14 | End: 2023-10-14

## 2023-10-14 RX ADMIN — SODIUM CHLORIDE, POTASSIUM CHLORIDE, SODIUM LACTATE AND CALCIUM CHLORIDE 1000 ML: 600; 310; 30; 20 INJECTION, SOLUTION INTRAVENOUS at 12:28

## 2023-10-14 RX ADMIN — Medication 200 MCG/HR: at 14:43

## 2023-10-14 RX ADMIN — PIPERACILLIN SODIUM AND TAZOBACTAM SODIUM 3.38 G: 3; .375 INJECTION, SOLUTION INTRAVENOUS at 17:01

## 2023-10-14 RX ADMIN — SODIUM CHLORIDE, POTASSIUM CHLORIDE, SODIUM LACTATE AND CALCIUM CHLORIDE 1000 ML: 600; 310; 30; 20 INJECTION, SOLUTION INTRAVENOUS at 17:51

## 2023-10-14 RX ADMIN — VANCOMYCIN HYDROCHLORIDE 750 MG: 750 INJECTION, SOLUTION INTRAVENOUS at 10:55

## 2023-10-14 RX ADMIN — Medication 200 MCG/HR: at 09:37

## 2023-10-14 RX ADMIN — THIAMINE HYDROCHLORIDE 500 MG: 200 INJECTION, SOLUTION INTRAMUSCULAR; INTRAVENOUS at 10:50

## 2023-10-14 RX ADMIN — DEXMEDETOMIDINE HYDROCHLORIDE 0.6 MCG/KG/HR: 4 INJECTION, SOLUTION INTRAVENOUS at 03:38

## 2023-10-14 RX ADMIN — ROCURONIUM BROMIDE 100 MG: 10 INJECTION, SOLUTION INTRAVENOUS at 01:15

## 2023-10-14 RX ADMIN — POTASSIUM CHLORIDE 20 MEQ: 14.9 INJECTION, SOLUTION INTRAVENOUS at 02:20

## 2023-10-14 RX ADMIN — OLANZAPINE 5 MG: 10 INJECTION, POWDER, LYOPHILIZED, FOR SOLUTION INTRAMUSCULAR at 13:45

## 2023-10-14 RX ADMIN — OLANZAPINE 5 MG: 10 INJECTION, POWDER, LYOPHILIZED, FOR SOLUTION INTRAMUSCULAR at 19:59

## 2023-10-14 RX ADMIN — THIAMINE HYDROCHLORIDE 500 MG: 200 INJECTION, SOLUTION INTRAMUSCULAR; INTRAVENOUS at 14:42

## 2023-10-14 RX ADMIN — MAGNESIUM SULFATE HEPTAHYDRATE 2 G: 40 INJECTION, SOLUTION INTRAVENOUS at 02:17

## 2023-10-14 RX ADMIN — MIDAZOLAM HYDROCHLORIDE 4 MG: 1 INJECTION INTRAMUSCULAR; INTRAVENOUS at 01:15

## 2023-10-14 RX ADMIN — FOLIC ACID 1 MG: 1 TABLET ORAL at 11:26

## 2023-10-14 RX ADMIN — THIAMINE HYDROCHLORIDE 500 MG: 200 INJECTION, SOLUTION INTRAMUSCULAR; INTRAVENOUS at 21:59

## 2023-10-14 RX ADMIN — PIPERACILLIN SODIUM AND TAZOBACTAM SODIUM 3.38 G: 3; .375 INJECTION, SOLUTION INTRAVENOUS at 20:14

## 2023-10-14 RX ADMIN — DEXMEDETOMIDINE HYDROCHLORIDE 1.5 MCG/KG/HR: 4 INJECTION, SOLUTION INTRAVENOUS at 14:11

## 2023-10-14 RX ADMIN — WATER: 1 INJECTION INTRAMUSCULAR; INTRAVENOUS; SUBCUTANEOUS at 20:00

## 2023-10-14 RX ADMIN — DEXMEDETOMIDINE HYDROCHLORIDE 1.5 MCG/KG/HR: 4 INJECTION, SOLUTION INTRAVENOUS at 23:07

## 2023-10-14 RX ADMIN — POLYETHYLENE GLYCOL 3350 17 G: 17 POWDER, FOR SOLUTION ORAL at 11:27

## 2023-10-14 RX ADMIN — NOREPINEPHRINE BITARTRATE 0.25 MCG/KG/MIN: 8 INJECTION, SOLUTION INTRAVENOUS at 21:07

## 2023-10-14 RX ADMIN — MIDAZOLAM 20 MG/HR: 5 INJECTION INTRAMUSCULAR; INTRAVENOUS at 10:28

## 2023-10-14 RX ADMIN — MIDAZOLAM HYDROCHLORIDE 4 MG: 1 INJECTION, SOLUTION INTRAMUSCULAR; INTRAVENOUS at 01:15

## 2023-10-14 RX ADMIN — PIPERACILLIN SODIUM AND TAZOBACTAM SODIUM 3.38 G: 3; .375 INJECTION, SOLUTION INTRAVENOUS at 10:50

## 2023-10-14 RX ADMIN — Medication 5 MG: at 21:14

## 2023-10-14 RX ADMIN — POTASSIUM CHLORIDE 20 MEQ: 14.9 INJECTION, SOLUTION INTRAVENOUS at 04:19

## 2023-10-14 RX ADMIN — SENNOSIDES 8.6 MG: 8.6 TABLET, FILM COATED ORAL at 21:15

## 2023-10-14 RX ADMIN — Medication 100 MCG/HR: at 00:02

## 2023-10-14 RX ADMIN — SENNOSIDES 8.6 MG: 8.6 TABLET, FILM COATED ORAL at 11:26

## 2023-10-14 RX ADMIN — MIDAZOLAM 5 MG/HR: 5 INJECTION INTRAMUSCULAR; INTRAVENOUS at 00:30

## 2023-10-14 RX ADMIN — MIDAZOLAM 3 MG/HR: 5 INJECTION INTRAMUSCULAR; INTRAVENOUS at 21:00

## 2023-10-14 RX ADMIN — ENOXAPARIN SODIUM 40 MG: 40 INJECTION SUBCUTANEOUS at 09:18

## 2023-10-14 RX ADMIN — NOREPINEPHRINE BITARTRATE 0.15 MCG/KG/MIN: 8 INJECTION, SOLUTION INTRAVENOUS at 11:09

## 2023-10-14 RX ADMIN — VANCOMYCIN HYDROCHLORIDE 750 MG: 750 INJECTION, SOLUTION INTRAVENOUS at 21:07

## 2023-10-14 RX ADMIN — DEXMEDETOMIDINE HYDROCHLORIDE 1.5 MCG/KG/HR: 4 INJECTION, SOLUTION INTRAVENOUS at 18:37

## 2023-10-14 RX ADMIN — DEXMEDETOMIDINE HYDROCHLORIDE 1.5 MCG/KG/HR: 4 INJECTION, SOLUTION INTRAVENOUS at 09:19

## 2023-10-14 RX ADMIN — Medication 1 TABLET: at 11:30

## 2023-10-14 RX ADMIN — Medication 200 MCG/HR: at 04:40

## 2023-10-14 ASSESSMENT — PAIN - FUNCTIONAL ASSESSMENT: PAIN_FUNCTIONAL_ASSESSMENT: CPOT (CRITICAL CARE PAIN OBSERVATION TOOL)

## 2023-10-14 NOTE — NURSING NOTE
Rn with patient at bedside- patient refusing to lay back in bed, patient continuously taking off all monitor wires and trying to get out of bed.   Patient having labored breathing- CICU resident and fellow at bedside.   Patient became unresponsive, started bagging O2

## 2023-10-14 NOTE — PROGRESS NOTES
Kathleen Hamman is a 62 y.o. female on day 5 of admission presenting with Bradycardia.      Subjective   Pt required reintubation due to increasing bp and decreased responsiveness. Pt unable to provide additional hx due to intubation. Discussion with nursing stated that she had increasing BP's and was agitated and then fell back and required bagging due to resp distress and was intubated.     Now sedated on dex, fent and versed while on levo. CXR concerning for edema and urine concerning for possible UTI.        Objective     Last Recorded Vitals  BP (!) 149/110   Pulse 86   Temp 36.8 °C (98.2 °F) (Temporal)   Resp 16   Wt 48.8 kg (107 lb 9.4 oz)   SpO2 100%   Intake/Output last 3 Shifts:    Intake/Output Summary (Last 24 hours) at 10/14/2023 0733  Last data filed at 10/13/2023 1500  Gross per 24 hour   Intake 1120 ml   Output 250 ml   Net 870 ml       Admission Weight  Weight: 56.8 kg (125 lb 3.5 oz) (10/09/23 0900)    Daily Weight  10/14/23 : 48.8 kg (107 lb 9.4 oz)    Image Results  XR chest 1 view  STUDY:   [XR CHEST 1 VIEW];  [10/13/2023 9:34 pm]    INDICATION:   [Signs/Symptoms:Post intubation].    COMPARISON:   [Chest radiograph 10/09/2023]    ACCESSION NUMBER(S):   [[id number]]    ORDERING CLINICIAN:   [MIHAI BEGUM]    FINDINGS:   [AP radiograph of the chest was provided.]     [Endotracheal tube with the tip projecting 3.5 cm above the suzy. Interval removal of enteric tube compared to prior imaging.]    CARDIOMEDIASTINAL SILHOUETTE:     [Cardiomediastinal silhouette is stable in size and configuration.]    LUNGS:     [Interval development of bilateral interstitial edema and perihilar vascular congestion with new small amount of fluid along the right oblique fissure. Trace left pleural effusion. No pneumothorax.]    ABDOMEN:     [No remarkable upper abdominal findings.]    BONES:     [No acute osseous changes.]    IMPRESSION:   [Perihilar vascular congestion and bilateral interstitial edema new  compared to prior.]    [New small left pleural effusion.]    ETT as described above.       Physical Exam  Vitals and nursing note reviewed.     Gen: intubated, sedated does not follow commands  HEENT: intubated, ett in place, ncat  Cv: rrr, no mrg, no peripheral edema  Lungs; intubated  Abdomen; nondistended  Ext; no edema  Psych; unable to assess  Neuro; sedated, some spontaneous movements; no tremors    Relevant Results  Scheduled medications  amiodarone, 150 mg, intravenous, Once  atropine, , ,   enoxaparin, 40 mg, subcutaneous, Daily  folic acid, 1 mg, oral, Daily  lidocaine, 1 patch, transdermal, Daily  losartan, 100 mg, oral, Daily  melatonin, 5 mg, oral, Nightly  multivitamin with minerals, 1 tablet, oral, Daily  multivitamin with minerals, 1 tablet, oral, Daily  perflutren protein A microsphere, 0.5 mL, intravenous, Once in imaging  phenylephrine HCl in 0.9% NaCl, , ,   polyethylene glycol, 17 g, oral, Daily  polyethylene glycol, 17 g, oral, Daily  sennosides, 1 tablet, oral, BID  sulfur hexafluoride microsphr, 2 mL, intravenous, Once in imaging  thiamine, 500 mg, intravenous, TID      Continuous medications  amiodarone, 0.5-1 mg/min, Last Rate: Stopped (10/13/23 2108)  dexmedeTOMIDine, 0.1-1.5 mcg/kg/hr, Last Rate: 1 mcg/kg/hr (10/14/23 0400)  fentaNYL,  mcg/hr, Last Rate: 200 mcg/hr (10/14/23 0440)  midazolam, 0-20 mg/hr, Last Rate: 7 mg/hr (10/14/23 0445)  nitroglycerin, 0-200 mcg/min, Last Rate: Stopped (10/13/23 2025)  norepinephrine, 0-3.3 mcg/kg/min, Last Rate: 0.16 mcg/kg/min (10/14/23 0315)      PRN medications  PRN medications: acetaminophen, atropine, benzonatate, dextrose, glucagon, midazolam, midazolam, OLANZapine, oxyCODONE, oxygen, oxygen, phenylephrine HCl in 0.9% NaCl, trimethobenzamide  Results for orders placed or performed during the hospital encounter of 10/09/23 (from the past 24 hour(s))   Renal function panel   Result Value Ref Range    Glucose 161 (H) 74 - 99 mg/dL    Sodium  133 (L) 136 - 145 mmol/L    Potassium 3.3 (L) 3.5 - 5.3 mmol/L    Chloride 90 (L) 98 - 107 mmol/L    Bicarbonate 32 21 - 32 mmol/L    Anion Gap 14 10 - 20 mmol/L    Urea Nitrogen 6 6 - 23 mg/dL    Creatinine 0.47 (L) 0.50 - 1.05 mg/dL    eGFR >90 >60 mL/min/1.73m*2    Calcium 9.3 8.6 - 10.6 mg/dL    Phosphorus 3.9 2.5 - 4.9 mg/dL    Albumin 3.7 3.4 - 5.0 g/dL   Magnesium   Result Value Ref Range    Magnesium 1.98 1.60 - 2.40 mg/dL   CBC   Result Value Ref Range    WBC 10.8 4.4 - 11.3 x10*3/uL    nRBC 0.0 0.0 - 0.0 /100 WBCs    RBC 3.43 (L) 4.00 - 5.20 x10*6/uL    Hemoglobin 11.4 (L) 12.0 - 16.0 g/dL    Hematocrit 31.3 (L) 36.0 - 46.0 %    MCV 91 80 - 100 fL    MCH 33.2 26.0 - 34.0 pg    MCHC 36.4 (H) 32.0 - 36.0 g/dL    RDW 11.2 (L) 11.5 - 14.5 %    Platelets 73 (L) 150 - 450 x10*3/uL    MPV 12.6 (H) 7.5 - 11.5 fL   Lactate   Result Value Ref Range    Lactate 2.3 (H) 0.4 - 2.0 mmol/L   Light Blue Top   Result Value Ref Range    Extra Tube Hold for add-ons.    SST TOP   Result Value Ref Range    Extra Tube Hold for add-ons.    CBC and Auto Differential   Result Value Ref Range    WBC 9.7 4.4 - 11.3 x10*3/uL    nRBC 0.0 0.0 - 0.0 /100 WBCs    RBC 3.20 (L) 4.00 - 5.20 x10*6/uL    Hemoglobin 10.6 (L) 12.0 - 16.0 g/dL    Hematocrit 30.4 (L) 36.0 - 46.0 %    MCV 95 80 - 100 fL    MCH 33.1 26.0 - 34.0 pg    MCHC 34.9 32.0 - 36.0 g/dL    RDW 11.4 (L) 11.5 - 14.5 %    Platelets 91 (L) 150 - 450 x10*3/uL    MPV 12.7 (H) 7.5 - 11.5 fL    Neutrophils % 74.7 40.0 - 80.0 %    Immature Granulocytes %, Automated 0.7 0.0 - 0.9 %    Lymphocytes % 8.3 13.0 - 44.0 %    Monocytes % 14.3 2.0 - 10.0 %    Eosinophils % 1.7 0.0 - 6.0 %    Basophils % 0.3 0.0 - 2.0 %    Neutrophils Absolute 7.22 1.20 - 7.70 x10*3/uL    Immature Granulocytes Absolute, Automated 0.07 0.00 - 0.70 x10*3/uL    Lymphocytes Absolute 0.80 (L) 1.20 - 4.80 x10*3/uL    Monocytes Absolute 1.38 (H) 0.10 - 1.00 x10*3/uL    Eosinophils Absolute 0.16 0.00 - 0.70 x10*3/uL     Basophils Absolute 0.03 0.00 - 0.10 x10*3/uL   Comprehensive Metabolic Panel   Result Value Ref Range    Glucose 158 (H) 74 - 99 mg/dL    Sodium 128 (L) 136 - 145 mmol/L    Potassium 3.6 3.5 - 5.3 mmol/L    Chloride 92 (L) 98 - 107 mmol/L    Bicarbonate 28 21 - 32 mmol/L    Anion Gap 12 10 - 20 mmol/L    Urea Nitrogen 8 6 - 23 mg/dL    Creatinine 0.44 (L) 0.50 - 1.05 mg/dL    eGFR >90 >60 mL/min/1.73m*2    Calcium 8.9 8.6 - 10.6 mg/dL    Albumin 3.5 3.4 - 5.0 g/dL    Alkaline Phosphatase 101 33 - 136 U/L    Total Protein 5.9 (L) 6.4 - 8.2 g/dL    AST 41 (H) 9 - 39 U/L    Bilirubin, Total 0.6 0.0 - 1.2 mg/dL    ALT 91 (H) 7 - 45 U/L   Magnesium   Result Value Ref Range    Magnesium 1.90 1.60 - 2.40 mg/dL   Urinalysis with Reflex Microscopic   Result Value Ref Range    Color, Urine Red (N) Straw, Yellow    Appearance, Urine Hazy (N) Clear    Specific Gravity, Urine 1.011 1.005 - 1.035    pH, Urine 7.0 5.0, 5.5, 6.0, 6.5, 7.0, 7.5, 8.0    Protein, Urine 30 (1+) (N) NEGATIVE mg/dL    Glucose, Urine NEGATIVE NEGATIVE mg/dL    Blood, Urine MODERATE (2+) (A) NEGATIVE    Ketones, Urine NEGATIVE NEGATIVE mg/dL    Bilirubin, Urine NEGATIVE NEGATIVE    Urobilinogen, Urine 2.0 (N) <2.0 mg/dL    Nitrite, Urine NEGATIVE NEGATIVE    Leukocyte Esterase, Urine LARGE (3+) (A) NEGATIVE   Osmolality, urine   Result Value Ref Range    Osmolality, Urine Random 374 200 - 1,200 mOsm/kg   Urine electrolytes   Result Value Ref Range    Sodium, Urine Random 108 mmol/L    Sodium/Creatinine Ratio 301 Not established. mmol/g Creat    Potassium, Urine Random 37 mmol/L    Potassium/Creatinine Ratio 103 Not established mmol/g Creat    Chloride, Urine Random 92 mmol/L    Chloride/Creatinine Ratio 256 38 - 318 mmol/g creat    Creatinine, Urine Random 35.9 20.0 - 320.0 mg/dL   Urinalysis Microscopic Only   Result Value Ref Range    WBC, Urine >50 (A) 1-5, NONE /HPF    WBC Clumps, Urine MANY Reference range not established. /HPF    RBC, Urine 6-10 (A)  NONE, 1-2, 3-5 /HPF    Squamous Epithelial Cells, Urine 1-9 (SPARSE) Reference range not established. /HPF    Hyaline Casts, Urine OCCASIONAL (A) NONE /LPF   Comprehensive Metabolic Panel   Result Value Ref Range    Glucose 149 (H) 74 - 99 mg/dL    Sodium 129 (L) 136 - 145 mmol/L    Potassium 5.0 3.5 - 5.3 mmol/L    Chloride 93 (L) 98 - 107 mmol/L    Bicarbonate 27 21 - 32 mmol/L    Anion Gap 14 10 - 20 mmol/L    Urea Nitrogen 10 6 - 23 mg/dL    Creatinine 0.58 0.50 - 1.05 mg/dL    eGFR >90 >60 mL/min/1.73m*2    Calcium 8.5 (L) 8.6 - 10.6 mg/dL    Albumin 3.4 3.4 - 5.0 g/dL    Alkaline Phosphatase 103 33 - 136 U/L    Total Protein 5.7 (L) 6.4 - 8.2 g/dL    AST 34 9 - 39 U/L    Bilirubin, Total 0.6 0.0 - 1.2 mg/dL    ALT 76 (H) 7 - 45 U/L       Principal Problem:    Bradycardia    This is a 63 y/o F currently admitted to the CICU after being found unresponsive, hypotensive and bradycardic. She is s/p intubation and TVP placement. Med tox was consulted for further recs     #respiratory failure  #acute toxic encephalopathy  #bradycardia  #hypotension  #hyperglycemia  #hyponatremia  #hypokalemia  #transaminitis        Recommendations for hypotension/bradycardia  -resolved, off of support  Recs for transaminitis  -completed NAC course without issue  Recs for elevated CIWA scores  -dc CIWA, continue thiamine and folate    Pt required reintubation due to agitation/resp distress  Pt was not exhibiting signs concerning for withdrawal   Wean sedation as tolerated  If needing assistance with sedation, please recontact tox     Med tox will continue to follow, please contact through secure messaging for questions/concerns          DO Bridgette Stone DO

## 2023-10-14 NOTE — PROGRESS NOTES
"Vancomycin Dosing by Pharmacy- INITIAL    Kathleen Hamman is a 62 y.o. year old female who Pharmacy has been consulted for vancomycin dosing for other UTI . Based on the patient's indication and renal status this patient will be dosed based on a goal AUC of 400-600.     Renal function is currently stable.    Visit Vitals  BP (!) 149/110   Pulse 86   Temp 36.4 °C (97.5 °F) (Temporal)   Resp 20        Lab Results   Component Value Date    CREATININE 0.58 10/14/2023    CREATININE 0.44 (L) 10/13/2023    CREATININE 0.47 (L) 10/13/2023    CREATININE 0.37 (L) 10/12/2023        Patient weight is No results found for: \"PTWEIGHT\"    No results found for: \"CULTURE\"     I/O last 3 completed shifts:  In: 1220 (25 mL/kg) [P.O.:1020; IV Piggyback:200]  Out: 900 (18.4 mL/kg) [Urine:900 (0.5 mL/kg/hr)]  Weight: 48.8 kg   [unfilled]    Lab Results   Component Value Date    PATIENTTEMP 37.0 10/10/2023    PATIENTTEMP 37.0 10/10/2023    PATIENTTEMP 37.0 10/09/2023          Assessment/Plan     Patient will not be given a loading dose.  Will initiate vancomycin maintenance,  750 mg every 12 hours.    This dosing regimen is predicted by [employer] to result in the following pharmacokinetic parameters:    AUC24,ss: 468 mg/L.hr  Probability of AUC24 > 400: 67 %  Ctrough,ss: 14 mg/L  Probability of Ctrough,ss > 20: 21 %    Follow-up level will be ordered on 10/15 at 0500 unless clinically indicated sooner.  Will continue to monitor renal function daily while on vancomycin and order serum creatinine at least every 48 hours if not already ordered.  Follow for continued vancomycin needs, clinical response, and signs/symptoms of toxicity.       Winston Mejia, PharmD       "

## 2023-10-14 NOTE — PROGRESS NOTES
Kathleen Hamman is a 62 y.o. female on day 5 of admission presenting with Bradycardia.    Subjective   Patient became hypertensive with Bps >200s/100s yesterday evening so was briefly started on nitroglycerin gtt that was later stopped. Patient was also also tachypneic into 50s with oxygen in the 70s and tachycardic to 130s-170s. Patient trialed on bipap with precedex gtt without success so code intubate called and patient intubated. Per intubation procedure note, patient went into SVT with rates into 170s post intubation so amio gtt was started. Patient started on propofol for sedation but with hypotension was switched to fentanyl, precedex, and midazolam. Due to persistent hypotension, patient was started on Levophed which was off and on throughout the night. Bedside TTE without any acute findings, normal RV.     This morning, patient is mildly agitated moving around. Levophed @ 0.16 this morning, precedex gtt @ 1, midazolam gtt @ 8, and fentanyl gtt @ 200. Amio stopped overnight. Current vent settings: 40%/16/350/8.          Objective   Review of Systems   Unable to perform ROS: Intubated        Physical Exam  Constitutional:       Appearance: She is not diaphoretic.      Comments: Intubated, sedated, moving around in bed trying to sit up   HENT:      Head: Normocephalic and atraumatic.      Nose: Nose normal.   Eyes:      General: No scleral icterus.     Extraocular Movements: Extraocular movements intact.      Conjunctiva/sclera: Conjunctivae normal.      Pupils: Pupils are equal, round, and reactive to light.   Cardiovascular:      Rate and Rhythm: Normal rate and regular rhythm.      Pulses: Normal pulses.   Pulmonary:      Breath sounds: Normal breath sounds.      Comments: intubated  Abdominal:      General: Bowel sounds are normal. There is no distension.      Palpations: Abdomen is soft.      Tenderness: There is abdominal tenderness.   Musculoskeletal:      Right lower leg: No edema.      Left lower leg:  "No edema.   Skin:     General: Skin is warm and dry.      Coloration: Skin is not jaundiced.   Neurological:      Comments: Follows commands         Last Recorded Vitals  Blood pressure (!) 149/110, pulse 86, temperature 36.4 °C (97.5 °F), temperature source Temporal, resp. rate 20, height 1.702 m (5' 7\"), weight 48.8 kg (107 lb 9.4 oz), SpO2 100 %.  Intake/Output last 3 Shifts:  I/O last 3 completed shifts:  In: 1220 (25 mL/kg) [P.O.:1020; IV Piggyback:200]  Out: 900 (18.4 mL/kg) [Urine:900 (0.5 mL/kg/hr)]  Weight: 48.8 kg     Relevant Results           This patient currently has cardiac telemetry ordered; if you would like to modify or discontinue the telemetry order, click here to go to the orders activity to modify/discontinue the order.  Scheduled medications  enoxaparin, 40 mg, subcutaneous, Daily  folic acid, 1 mg, oral, Daily  lidocaine, 1 patch, transdermal, Daily  [Held by provider] losartan, 100 mg, oral, Daily  melatonin, 5 mg, oral, Nightly  multivitamin with minerals, 1 tablet, oral, Daily  multivitamin with minerals, 1 tablet, oral, Daily  perflutren protein A microsphere, 0.5 mL, intravenous, Once in imaging  piperacillin-tazobactam, 3.375 g, intravenous, q6h  polyethylene glycol, 17 g, oral, Daily  polyethylene glycol, 17 g, oral, Daily  sennosides, 1 tablet, oral, BID  sulfur hexafluoride microsphr, 2 mL, intravenous, Once in imaging  thiamine, 500 mg, intravenous, TID  vancomycin, 15 mg/kg, intravenous, q12h      Continuous medications  dexmedeTOMIDine, 0.1-1.5 mcg/kg/hr, Last Rate: 1 mcg/kg/hr (10/14/23 0400)  fentaNYL,  mcg/hr, Last Rate: 200 mcg/hr (10/14/23 0440)  midazolam, 0-20 mg/hr, Last Rate: 7 mg/hr (10/14/23 0445)  norepinephrine, 0-3.3 mcg/kg/min, Last Rate: 0.16 mcg/kg/min (10/14/23 0315)    PRN medications  PRN medications: acetaminophen, benzonatate, dextrose, glucagon, midazolam, midazolam, OLANZapine, oxyCODONE, oxygen, oxygen, trimethobenzamide     Results for orders placed " or performed during the hospital encounter of 10/09/23 (from the past 24 hour(s))   Renal function panel   Result Value Ref Range    Glucose 161 (H) 74 - 99 mg/dL    Sodium 133 (L) 136 - 145 mmol/L    Potassium 3.3 (L) 3.5 - 5.3 mmol/L    Chloride 90 (L) 98 - 107 mmol/L    Bicarbonate 32 21 - 32 mmol/L    Anion Gap 14 10 - 20 mmol/L    Urea Nitrogen 6 6 - 23 mg/dL    Creatinine 0.47 (L) 0.50 - 1.05 mg/dL    eGFR >90 >60 mL/min/1.73m*2    Calcium 9.3 8.6 - 10.6 mg/dL    Phosphorus 3.9 2.5 - 4.9 mg/dL    Albumin 3.7 3.4 - 5.0 g/dL   Magnesium   Result Value Ref Range    Magnesium 1.98 1.60 - 2.40 mg/dL   CBC   Result Value Ref Range    WBC 10.8 4.4 - 11.3 x10*3/uL    nRBC 0.0 0.0 - 0.0 /100 WBCs    RBC 3.43 (L) 4.00 - 5.20 x10*6/uL    Hemoglobin 11.4 (L) 12.0 - 16.0 g/dL    Hematocrit 31.3 (L) 36.0 - 46.0 %    MCV 91 80 - 100 fL    MCH 33.2 26.0 - 34.0 pg    MCHC 36.4 (H) 32.0 - 36.0 g/dL    RDW 11.2 (L) 11.5 - 14.5 %    Platelets 73 (L) 150 - 450 x10*3/uL    MPV 12.6 (H) 7.5 - 11.5 fL   Lactate   Result Value Ref Range    Lactate 2.3 (H) 0.4 - 2.0 mmol/L   Light Blue Top   Result Value Ref Range    Extra Tube Hold for add-ons.    SST TOP   Result Value Ref Range    Extra Tube Hold for add-ons.    CBC and Auto Differential   Result Value Ref Range    WBC 9.7 4.4 - 11.3 x10*3/uL    nRBC 0.0 0.0 - 0.0 /100 WBCs    RBC 3.20 (L) 4.00 - 5.20 x10*6/uL    Hemoglobin 10.6 (L) 12.0 - 16.0 g/dL    Hematocrit 30.4 (L) 36.0 - 46.0 %    MCV 95 80 - 100 fL    MCH 33.1 26.0 - 34.0 pg    MCHC 34.9 32.0 - 36.0 g/dL    RDW 11.4 (L) 11.5 - 14.5 %    Platelets 91 (L) 150 - 450 x10*3/uL    MPV 12.7 (H) 7.5 - 11.5 fL    Neutrophils % 74.7 40.0 - 80.0 %    Immature Granulocytes %, Automated 0.7 0.0 - 0.9 %    Lymphocytes % 8.3 13.0 - 44.0 %    Monocytes % 14.3 2.0 - 10.0 %    Eosinophils % 1.7 0.0 - 6.0 %    Basophils % 0.3 0.0 - 2.0 %    Neutrophils Absolute 7.22 1.20 - 7.70 x10*3/uL    Immature Granulocytes Absolute, Automated 0.07 0.00 -  0.70 x10*3/uL    Lymphocytes Absolute 0.80 (L) 1.20 - 4.80 x10*3/uL    Monocytes Absolute 1.38 (H) 0.10 - 1.00 x10*3/uL    Eosinophils Absolute 0.16 0.00 - 0.70 x10*3/uL    Basophils Absolute 0.03 0.00 - 0.10 x10*3/uL   Comprehensive Metabolic Panel   Result Value Ref Range    Glucose 158 (H) 74 - 99 mg/dL    Sodium 128 (L) 136 - 145 mmol/L    Potassium 3.6 3.5 - 5.3 mmol/L    Chloride 92 (L) 98 - 107 mmol/L    Bicarbonate 28 21 - 32 mmol/L    Anion Gap 12 10 - 20 mmol/L    Urea Nitrogen 8 6 - 23 mg/dL    Creatinine 0.44 (L) 0.50 - 1.05 mg/dL    eGFR >90 >60 mL/min/1.73m*2    Calcium 8.9 8.6 - 10.6 mg/dL    Albumin 3.5 3.4 - 5.0 g/dL    Alkaline Phosphatase 101 33 - 136 U/L    Total Protein 5.9 (L) 6.4 - 8.2 g/dL    AST 41 (H) 9 - 39 U/L    Bilirubin, Total 0.6 0.0 - 1.2 mg/dL    ALT 91 (H) 7 - 45 U/L   Magnesium   Result Value Ref Range    Magnesium 1.90 1.60 - 2.40 mg/dL   Urinalysis with Reflex Microscopic   Result Value Ref Range    Color, Urine Red (N) Straw, Yellow    Appearance, Urine Hazy (N) Clear    Specific Gravity, Urine 1.011 1.005 - 1.035    pH, Urine 7.0 5.0, 5.5, 6.0, 6.5, 7.0, 7.5, 8.0    Protein, Urine 30 (1+) (N) NEGATIVE mg/dL    Glucose, Urine NEGATIVE NEGATIVE mg/dL    Blood, Urine MODERATE (2+) (A) NEGATIVE    Ketones, Urine NEGATIVE NEGATIVE mg/dL    Bilirubin, Urine NEGATIVE NEGATIVE    Urobilinogen, Urine 2.0 (N) <2.0 mg/dL    Nitrite, Urine NEGATIVE NEGATIVE    Leukocyte Esterase, Urine LARGE (3+) (A) NEGATIVE   Osmolality, urine   Result Value Ref Range    Osmolality, Urine Random 374 200 - 1,200 mOsm/kg   Urine electrolytes   Result Value Ref Range    Sodium, Urine Random 108 mmol/L    Sodium/Creatinine Ratio 301 Not established. mmol/g Creat    Potassium, Urine Random 37 mmol/L    Potassium/Creatinine Ratio 103 Not established mmol/g Creat    Chloride, Urine Random 92 mmol/L    Chloride/Creatinine Ratio 256 38 - 318 mmol/g creat    Creatinine, Urine Random 35.9 20.0 - 320.0 mg/dL    Urinalysis Microscopic Only   Result Value Ref Range    WBC, Urine >50 (A) 1-5, NONE /HPF    WBC Clumps, Urine MANY Reference range not established. /HPF    RBC, Urine 6-10 (A) NONE, 1-2, 3-5 /HPF    Squamous Epithelial Cells, Urine 1-9 (SPARSE) Reference range not established. /HPF    Hyaline Casts, Urine OCCASIONAL (A) NONE /LPF   Comprehensive Metabolic Panel   Result Value Ref Range    Glucose 149 (H) 74 - 99 mg/dL    Sodium 129 (L) 136 - 145 mmol/L    Potassium 5.0 3.5 - 5.3 mmol/L    Chloride 93 (L) 98 - 107 mmol/L    Bicarbonate 27 21 - 32 mmol/L    Anion Gap 14 10 - 20 mmol/L    Urea Nitrogen 10 6 - 23 mg/dL    Creatinine 0.58 0.50 - 1.05 mg/dL    eGFR >90 >60 mL/min/1.73m*2    Calcium 8.5 (L) 8.6 - 10.6 mg/dL    Albumin 3.4 3.4 - 5.0 g/dL    Alkaline Phosphatase 103 33 - 136 U/L    Total Protein 5.7 (L) 6.4 - 8.2 g/dL    AST 34 9 - 39 U/L    Bilirubin, Total 0.6 0.0 - 1.2 mg/dL    ALT 76 (H) 7 - 45 U/L   Osmolality   Result Value Ref Range    Osmolality, Serum 270 (L) 280 - 300 mOsm/kg                  Assessment/Plan   Principal Problem:    Bradycardia    62 year old female transported from [facility] to CICU on 10/9 for bradycardia 2/2 to CCB intoxication with verapamil. Patient had improved and was pending transfer to floor bed since 10/12 but acutely decompensated overnight requiring reintubation. Patient became acutely hypertensive with Bps >200s/100s and acute hypoxic respiratory failure likely 2/2 flash pulmonary edema. Patient had not yet recovered back to her baseline mental status prior to last night's events. Patient likely has underlying infection as well, likely UTI source, so antibiotics initiated this morning with vanc/zosyn.        Neuro/Psych  #Acute encephalopathy, metabolic v infectious   #Anxiety  #Sedation   -On precedex gtt @1  -On fentanyl gtt @200  -On midazolam @ 8  -pt denies SI  -Psych signed off 10/12, pt not engaging    -PRN zyprexa at bedtime    -Melatonin nightly    -On vanc/zosyn (10/14 -   -Blood and urine cultures obtained        CV  #Recent CCB intoxication with bradycardia, resolved  #Hypertensive emergency, resolved  #Hypotension, intermittent  #Tachycardia, resolved   -Med tox following  -nitroglycerin gtt briefly on overnight, since stopped  -amio gtt briefly on overnight, since stopped  -Hold home losartan 100mg   -On Levophed, wean as able       Resp  #AHRF likely 2/2 flash pulmonary edema  -intubated 10/13 late evening  -was previously intubated 10/9-10/10 for airway protection from CCB intoxication   -ABG 0522 7.48/33/104     GI  #Transaminitis likely 2/2 shock liver, resolved  #Etoh use history  #Abdominal tenderness, unclear etiology  -s/p NAC, LFTs downtrended  -CIWA protocol discontinued 10/13, not exhibiting signs of substance withdrawal   -Liver US, not completed as pt refused  -Ammonia level 30   -Monitor abdominal symptoms, no acute findings on KUB  -OG in place     Endo  -no active issues     Renal  #DARIUS, resolved  #Hyponatremia  #Hypokalemia, improving   #Hypomagnesemia, improving   #Lactic acidosis  -Monitor and replete electrolytes as needed  -lactate 6.3 on admission, previously normalized now 2.3  -Serum osmolality 270, urine osmolality 374  -Urine electrolytes pending      Heme Onc  #Thrombocytopenia, unclear etiology   #R/o DVT  -Monitor, no s/s of bleeding  -HIV negative, Hep C negative   -Liver US not completed as pt refused  -Obtain peripheral smear   -US duplex bilateral LE pending     ID  #[id number]  -UA with large leukocyte esterase and >50 WBCs  -Urine culture pending  -Blood cultures pending  -Started on vanc/zosyn (10/14-      N: NPO   DVT ppx: lovenox   A: PIV, R radial arterial line (10/13)  Code Status: Full Code  NOK:  Eliseo [phone]                          Katherine Hurst MD

## 2023-10-14 NOTE — PROCEDURES
Arterial Line Insertion    Date/Time: 10/13/2023 10:31 PM    Performed by: Brice Chacko MD  Authorized by: Brice Chacko MD    Consent:     Consent obtained:  Emergent situation  Universal protocol:     Relevant documents present and verified: yes      Test results available: yes      Immediately prior to procedure, a time out was called: yes      Patient identity confirmed:  Arm band  Indications:     Indications: hemodynamic monitoring    Comments:      The patient was prepped and draped in the usual sterile manner using chlorhexidine scrub. 1% lidocaine was used to numb the region. The RIGHT radial artery was palpated and successfully cannulated. Pulsatile, arterial blood was visualized and the artery was then threaded using the Seldinger technique and a catheter was then sutured into place. Good wave-form was obtained. The patient tolerated the procedure well without any immediate complications. The area was cleaned and Tegaderm was applied.

## 2023-10-14 NOTE — PROCEDURES
Intubation    Date/Time: 10/13/2023 8:35 PM    Performed by: Josephine Martinez MD  Authorized by: Josephine Martinez MD    Consent:     Consent obtained:  Emergent situation  Universal protocol:     Relevant documents present and verified: yes      Test results available: yes      Site/side marked: no      Immediately prior to procedure, a time out was called: yes      Patient identity confirmed:  Arm band and hospital-assigned identification number  Pre-procedure details:     Indications: respiratory distress      Patient status:  Altered mental status    Look externally: no concerns      Mouth opening - incisor distance:  3 or more finger widths    Hyoid-mental distance: 3 or more finger widths      Hyoid-thyroid distance: 2 or more finger widths      Mallampati score:  I    Obstruction: none      Neck mobility: normal      Pharmacologic strategy: RSI      Induction agents:  Propofol    Paralytics:  Rocuronium  Procedure details:     Preoxygenation:  BiLevel    CPR in progress: no      Number of attempts:  1  Successful intubation attempt details:     Intubation method:  Oral    Intubation technique: video assisted      Laryngoscope blade:  Mac 3    Bougie used: no      Grade view: I      Tube size (mm):  7.0    Tube type:  Cuffed    Tube visualized through cords: yes    Placement assessment:     ETT at teeth/gumline (cm):  23    Tube secured with:  ETT baumann    Breath sounds:  Equal    Placement verification: chest rise, colorimetric ETCO2 and direct visualization    Post-procedure details:     Procedure completion:  Tolerated well, no immediate complications  Comments:      Patient went into SVT with rate 170s post intubation. Amio given, further management by CICU  400mcg phenylepherine given in divided doses for post-intubation hypotension.

## 2023-10-15 ENCOUNTER — APPOINTMENT (OUTPATIENT)
Dept: RADIOLOGY | Facility: HOSPITAL | Age: 63
DRG: 917 | End: 2023-10-15
Payer: COMMERCIAL

## 2023-10-15 LAB
ALBUMIN SERPL BCP-MCNC: 2.7 G/DL (ref 3.4–5)
ANION GAP BLDA CALCULATED.4IONS-SCNC: 7 MMO/L (ref 10–25)
ANION GAP BLDA CALCULATED.4IONS-SCNC: ABNORMAL MMOL/L
ANION GAP SERPL CALC-SCNC: 13 MMOL/L (ref 10–20)
BASE EXCESS BLDA CALC-SCNC: 1.7 MMOL/L (ref -2–3)
BASE EXCESS BLDA CALC-SCNC: 2.3 MMOL/L (ref -2–3)
BASOPHILS # BLD AUTO: 0.05 X10*3/UL (ref 0–0.1)
BASOPHILS NFR BLD AUTO: 0.5 %
BODY TEMPERATURE: 37 DEGREES CELSIUS
BODY TEMPERATURE: 37 DEGREES CELSIUS
BUN SERPL-MCNC: 11 MG/DL (ref 6–23)
CA-I BLDA-SCNC: 1.19 MMOL/L (ref 1.1–1.33)
CA-I BLDA-SCNC: 1.2 MMOL/L (ref 1.1–1.33)
CALCIUM SERPL-MCNC: 7.8 MG/DL (ref 8.6–10.6)
CHLORIDE BLDA-SCNC: 102 MMOL/L (ref 98–107)
CHLORIDE BLDA-SCNC: 103 MMOL/L (ref 98–107)
CHLORIDE SERPL-SCNC: 98 MMOL/L (ref 98–107)
CHLORIDE UR-SCNC: 25 MMOL/L
CHLORIDE/CREATININE (MMOL/G) IN URINE: 58 MMOL/G CREAT (ref 38–318)
CO2 SERPL-SCNC: 24 MMOL/L (ref 21–32)
CREAT SERPL-MCNC: 0.72 MG/DL (ref 0.5–1.05)
CREAT UR-MCNC: 43.4 MG/DL (ref 20–320)
EOSINOPHIL # BLD AUTO: 0.08 X10*3/UL (ref 0–0.7)
EOSINOPHIL NFR BLD AUTO: 0.7 %
ERYTHROCYTE [DISTWIDTH] IN BLOOD BY AUTOMATED COUNT: 11.2 % (ref 11.5–14.5)
ERYTHROCYTE [DISTWIDTH] IN BLOOD BY AUTOMATED COUNT: 11.2 % (ref 11.5–14.5)
GFR SERPL CREATININE-BSD FRML MDRD: >90 ML/MIN/1.73M*2
GLUCOSE BLDA-MCNC: 147 MG/DL (ref 74–99)
GLUCOSE BLDA-MCNC: 152 MG/DL (ref 74–99)
GLUCOSE SERPL-MCNC: 149 MG/DL (ref 74–99)
HCO3 BLDA-SCNC: 25.7 MMOL/L (ref 22–26)
HCO3 BLDA-SCNC: 26.4 MMOL/L (ref 22–26)
HCT VFR BLD AUTO: 23.2 % (ref 36–46)
HCT VFR BLD AUTO: 23.2 % (ref 36–46)
HCT VFR BLD EST: 28 % (ref 36–46)
HCT VFR BLD EST: 28 % (ref 36–46)
HGB BLD-MCNC: 8.3 G/DL (ref 12–16)
HGB BLD-MCNC: 8.3 G/DL (ref 12–16)
HGB BLDA-MCNC: 9.2 G/DL (ref 12–16)
HGB BLDA-MCNC: 9.2 G/DL (ref 12–16)
IMM GRANULOCYTES # BLD AUTO: 0.08 X10*3/UL (ref 0–0.7)
IMM GRANULOCYTES NFR BLD AUTO: 0.7 % (ref 0–0.9)
INHALED O2 CONCENTRATION: 30 %
INHALED O2 CONCENTRATION: 30 %
LACTATE BLDA-SCNC: 0.5 MMOL/L (ref 0.4–2)
LACTATE BLDA-SCNC: 0.6 MMOL/L (ref 0.4–2)
LYMPHOCYTES # BLD AUTO: 1.34 X10*3/UL (ref 1.2–4.8)
LYMPHOCYTES NFR BLD AUTO: 12.4 %
MAGNESIUM SERPL-MCNC: 2.04 MG/DL (ref 1.6–2.4)
MCH RBC QN AUTO: 33.7 PG (ref 26–34)
MCH RBC QN AUTO: 33.7 PG (ref 26–34)
MCHC RBC AUTO-ENTMCNC: 35.8 G/DL (ref 32–36)
MCHC RBC AUTO-ENTMCNC: 35.8 G/DL (ref 32–36)
MCV RBC AUTO: 94 FL (ref 80–100)
MCV RBC AUTO: 94 FL (ref 80–100)
MONOCYTES # BLD AUTO: 1.32 X10*3/UL (ref 0.1–1)
MONOCYTES NFR BLD AUTO: 12.2 %
NEUTROPHILS # BLD AUTO: 7.95 X10*3/UL (ref 1.2–7.7)
NEUTROPHILS NFR BLD AUTO: 73.5 %
NRBC BLD-RTO: 0 /100 WBCS (ref 0–0)
NRBC BLD-RTO: 0 /100 WBCS (ref 0–0)
OXYHGB MFR BLDA: 97.3 % (ref 94–98)
OXYHGB MFR BLDA: 97.5 % (ref 94–98)
PCO2 BLDA: 37 MM HG (ref 38–42)
PCO2 BLDA: 38 MM HG (ref 38–42)
PH BLDA: 7.45 PH (ref 7.38–7.42)
PH BLDA: 7.45 PH (ref 7.38–7.42)
PHOSPHATE SERPL-MCNC: 4 MG/DL (ref 2.5–4.9)
PLATELET # BLD AUTO: 56 X10*3/UL (ref 150–450)
PLATELET # BLD AUTO: 56 X10*3/UL (ref 150–450)
PMV BLD AUTO: 12.9 FL (ref 7.5–11.5)
PMV BLD AUTO: 12.9 FL (ref 7.5–11.5)
PO2 BLDA: 122 MM HG (ref 85–95)
PO2 BLDA: 129 MM HG (ref 85–95)
POTASSIUM BLDA-SCNC: 3.4 MMOL/L (ref 3.5–5.3)
POTASSIUM BLDA-SCNC: 3.6 MMOL/L (ref 3.5–5.3)
POTASSIUM SERPL-SCNC: 3.9 MMOL/L (ref 3.5–5.3)
POTASSIUM UR-SCNC: 26 MMOL/L
POTASSIUM/CREAT UR-RTO: 60 MMOL/G CREAT
RBC # BLD AUTO: 2.46 X10*6/UL (ref 4–5.2)
RBC # BLD AUTO: 2.46 X10*6/UL (ref 4–5.2)
RBC MORPH BLD: NORMAL
SAO2 % BLDA: 100 % (ref 94–100)
SAO2 % BLDA: 100 % (ref 94–100)
SODIUM BLDA-SCNC: 133 MMOL/L (ref 136–145)
SODIUM BLDA-SCNC: ABNORMAL MMOL/L
SODIUM SERPL-SCNC: 131 MMOL/L (ref 136–145)
SODIUM UR-SCNC: 29 MMOL/L
SODIUM/CREAT UR-RTO: 67 MMOL/G CREAT
VANCOMYCIN SERPL-MCNC: 11.1 UG/ML (ref 5–20)
WBC # BLD AUTO: 10.8 X10*3/UL (ref 4.4–11.3)
WBC # BLD AUTO: 10.8 X10*3/UL (ref 4.4–11.3)

## 2023-10-15 PROCEDURE — 85018 HEMOGLOBIN: CPT | Performed by: STUDENT IN AN ORGANIZED HEALTH CARE EDUCATION/TRAINING PROGRAM

## 2023-10-15 PROCEDURE — 2500000004 HC RX 250 GENERAL PHARMACY W/ HCPCS (ALT 636 FOR OP/ED)

## 2023-10-15 PROCEDURE — 80069 RENAL FUNCTION PANEL: CPT

## 2023-10-15 PROCEDURE — 96372 THER/PROPH/DIAG INJ SC/IM: CPT

## 2023-10-15 PROCEDURE — 2500000005 HC RX 250 GENERAL PHARMACY W/O HCPCS

## 2023-10-15 PROCEDURE — 87081 CULTURE SCREEN ONLY: CPT

## 2023-10-15 PROCEDURE — 82805 BLOOD GASES W/O2 SATURATION: CPT | Mod: CMCLAB | Performed by: STUDENT IN AN ORGANIZED HEALTH CARE EDUCATION/TRAINING PROGRAM

## 2023-10-15 PROCEDURE — 83735 ASSAY OF MAGNESIUM: CPT | Mod: CMCLAB

## 2023-10-15 PROCEDURE — 94003 VENT MGMT INPAT SUBQ DAY: CPT

## 2023-10-15 PROCEDURE — 36556 INSERT NON-TUNNEL CV CATH: CPT | Performed by: STUDENT IN AN ORGANIZED HEALTH CARE EDUCATION/TRAINING PROGRAM

## 2023-10-15 PROCEDURE — 85025 COMPLETE CBC W/AUTO DIFF WBC: CPT

## 2023-10-15 PROCEDURE — 51702 INSERT TEMP BLADDER CATH: CPT

## 2023-10-15 PROCEDURE — 80202 ASSAY OF VANCOMYCIN: CPT

## 2023-10-15 PROCEDURE — 1200000002 HC GENERAL ROOM WITH TELEMETRY DAILY

## 2023-10-15 PROCEDURE — 2500000001 HC RX 250 WO HCPCS SELF ADMINISTERED DRUGS (ALT 637 FOR MEDICARE OP)

## 2023-10-15 PROCEDURE — 84295 ASSAY OF SERUM SODIUM: CPT | Performed by: STUDENT IN AN ORGANIZED HEALTH CARE EDUCATION/TRAINING PROGRAM

## 2023-10-15 PROCEDURE — 71045 X-RAY EXAM CHEST 1 VIEW: CPT

## 2023-10-15 PROCEDURE — 85027 COMPLETE CBC AUTOMATED: CPT | Mod: CMCLAB

## 2023-10-15 PROCEDURE — 71045 X-RAY EXAM CHEST 1 VIEW: CPT | Performed by: RADIOLOGY

## 2023-10-15 PROCEDURE — 84300 ASSAY OF URINE SODIUM: CPT | Mod: CMCLAB

## 2023-10-15 PROCEDURE — 37799 UNLISTED PX VASCULAR SURGERY: CPT | Mod: CMCLAB

## 2023-10-15 PROCEDURE — 99291 CRITICAL CARE FIRST HOUR: CPT

## 2023-10-15 PROCEDURE — 99231 SBSQ HOSP IP/OBS SF/LOW 25: CPT | Performed by: EMERGENCY MEDICINE

## 2023-10-15 RX ADMIN — Medication 50 MCG/HR: at 08:00

## 2023-10-15 RX ADMIN — VANCOMYCIN HYDROCHLORIDE 750 MG: 750 INJECTION, SOLUTION INTRAVENOUS at 09:32

## 2023-10-15 RX ADMIN — ENOXAPARIN SODIUM 40 MG: 40 INJECTION SUBCUTANEOUS at 09:33

## 2023-10-15 RX ADMIN — ACETAMINOPHEN 975 MG: 325 TABLET ORAL at 21:35

## 2023-10-15 RX ADMIN — PIPERACILLIN SODIUM AND TAZOBACTAM SODIUM 3.38 G: 3; .375 INJECTION, SOLUTION INTRAVENOUS at 09:32

## 2023-10-15 RX ADMIN — OXYCODONE HYDROCHLORIDE 5 MG: 5 TABLET ORAL at 21:36

## 2023-10-15 RX ADMIN — DEXMEDETOMIDINE HYDROCHLORIDE 0.8 MCG/KG/HR: 4 INJECTION, SOLUTION INTRAVENOUS at 19:00

## 2023-10-15 RX ADMIN — PIPERACILLIN SODIUM AND TAZOBACTAM SODIUM 3.38 G: 3; .375 INJECTION, SOLUTION INTRAVENOUS at 22:22

## 2023-10-15 RX ADMIN — LIDOCAINE 1 PATCH: 4 PATCH TOPICAL at 09:34

## 2023-10-15 RX ADMIN — Medication 4 L/MIN: at 17:00

## 2023-10-15 RX ADMIN — PIPERACILLIN SODIUM AND TAZOBACTAM SODIUM 3.38 G: 3; .375 INJECTION, SOLUTION INTRAVENOUS at 15:13

## 2023-10-15 RX ADMIN — SENNOSIDES 8.6 MG: 8.6 TABLET, FILM COATED ORAL at 21:26

## 2023-10-15 RX ADMIN — DEXMEDETOMIDINE HYDROCHLORIDE 1.5 MCG/KG/HR: 4 INJECTION, SOLUTION INTRAVENOUS at 03:23

## 2023-10-15 RX ADMIN — DEXMEDETOMIDINE HYDROCHLORIDE 1.5 MCG/KG/HR: 4 INJECTION, SOLUTION INTRAVENOUS at 09:33

## 2023-10-15 RX ADMIN — Medication 5 MG: at 21:26

## 2023-10-15 RX ADMIN — Medication 70 MCG/HR: at 01:39

## 2023-10-15 RX ADMIN — POLYETHYLENE GLYCOL 3350 17 G: 17 POWDER, FOR SOLUTION ORAL at 09:32

## 2023-10-15 RX ADMIN — SENNOSIDES 8.6 MG: 8.6 TABLET, FILM COATED ORAL at 09:33

## 2023-10-15 RX ADMIN — DEXMEDETOMIDINE HYDROCHLORIDE 1.5 MCG/KG/HR: 4 INJECTION, SOLUTION INTRAVENOUS at 12:00

## 2023-10-15 RX ADMIN — FOLIC ACID 1 MG: 1 TABLET ORAL at 09:32

## 2023-10-15 RX ADMIN — NOREPINEPHRINE BITARTRATE 0.05 MCG/KG/MIN: 8 INJECTION, SOLUTION INTRAVENOUS at 10:30

## 2023-10-15 RX ADMIN — Medication 1 TABLET: at 09:33

## 2023-10-15 RX ADMIN — PIPERACILLIN SODIUM AND TAZOBACTAM SODIUM 3.38 G: 3; .375 INJECTION, SOLUTION INTRAVENOUS at 02:20

## 2023-10-15 ASSESSMENT — PAIN SCALES - GENERAL
PAINLEVEL_OUTOF10: 0 - NO PAIN
PAINLEVEL_OUTOF10: 2
PAINLEVEL_OUTOF10: 2
PAINLEVEL_OUTOF10: 0 - NO PAIN
PAINLEVEL_OUTOF10: 7

## 2023-10-15 ASSESSMENT — PAIN - FUNCTIONAL ASSESSMENT
PAIN_FUNCTIONAL_ASSESSMENT: 0-10
PAIN_FUNCTIONAL_ASSESSMENT: CPOT (CRITICAL CARE PAIN OBSERVATION TOOL)
PAIN_FUNCTIONAL_ASSESSMENT: 0-10
PAIN_FUNCTIONAL_ASSESSMENT: CPOT (CRITICAL CARE PAIN OBSERVATION TOOL)
PAIN_FUNCTIONAL_ASSESSMENT: CPOT (CRITICAL CARE PAIN OBSERVATION TOOL)

## 2023-10-15 NOTE — PROGRESS NOTES
"Kathleen Hamman is a 62 y.o. female on day 6 of admission presenting with Bradycardia.    Subjective   Patient on precedex gtt at 1.5, fentanyl gtt at 70, midazolam gtt at 3, and Levophed dosage 0.08. Central line placed overnight. Vent settings 30%/12/350/PEEP 5, plan to wean sedation and trial for extubation this morning.          Objective   Review of Systems   Unable to perform ROS: Intubated        Physical Exam  Constitutional:       Appearance: She is not diaphoretic.      Comments: Intubated, sedated   HENT:      Head: Normocephalic and atraumatic.      Nose: Nose normal.   Eyes:      General: No scleral icterus.     Extraocular Movements: Extraocular movements intact.      Conjunctiva/sclera: Conjunctivae normal.      Pupils: Pupils are equal, round, and reactive to light.   Cardiovascular:      Rate and Rhythm: Normal rate and regular rhythm.      Pulses: Normal pulses.   Pulmonary:      Breath sounds: No wheezing or rhonchi.      Comments: intubated  Abdominal:      General: Bowel sounds are normal. There is no distension.      Palpations: Abdomen is soft.      Tenderness: There is no abdominal tenderness.   Musculoskeletal:      Right lower leg: No edema.      Left lower leg: No edema.   Skin:     General: Skin is warm and dry.      Coloration: Skin is not jaundiced.   Neurological:      Comments: sedated         Last Recorded Vitals  Blood pressure 110/78, pulse 74, temperature 36.2 °C (97.2 °F), temperature source Temporal, resp. rate 15, height 1.702 m (5' 7\"), weight 48.8 kg (107 lb 9.4 oz), SpO2 95 %.  Intake/Output last 3 Shifts:  I/O last 3 completed shifts:  In: 4423.8 (90.7 mL/kg) [I.V.:1848.8 (37.9 mL/kg); IV Piggyback:2575]  Out: 1900 (38.9 mL/kg) [Urine:1900 (1.1 mL/kg/hr)]  Weight: 48.8 kg     Relevant Results           This patient currently has cardiac telemetry ordered; if you would like to modify or discontinue the telemetry order, click here to go to the orders activity to " modify/discontinue the order.  Scheduled medications  enoxaparin, 40 mg, subcutaneous, Daily  folic acid, 1 mg, oral, Daily  lidocaine, 1 patch, transdermal, Daily  [Held by provider] losartan, 100 mg, oral, Daily  melatonin, 5 mg, oral, Nightly  multivitamin with minerals, 1 tablet, oral, Daily  perflutren lipid microspheres, 3 mL of dilution, intravenous, Once in imaging  perflutren protein A microsphere, 0.5 mL, intravenous, Once in imaging  piperacillin-tazobactam, 3.375 g, intravenous, q6h  polyethylene glycol, 17 g, oral, Daily  polyethylene glycol, 17 g, oral, Daily  sennosides, 1 tablet, oral, BID  sulfur hexafluoride microsphr, 2 mL, intravenous, Once in imaging      Continuous medications  dexmedeTOMIDine, 0.1-1.5 mcg/kg/hr, Last Rate: 1.5 mcg/kg/hr (10/15/23 0933)  fentaNYL,  mcg/hr, Last Rate: 70 mcg/hr (10/15/23 0600)  midazolam, 0-20 mg/hr, Last Rate: 3 mg/hr (10/15/23 0600)  norepinephrine, 0-3.3 mcg/kg/min, Last Rate: 0.08 mcg/kg/min (10/15/23 0600)    PRN medications  PRN medications: acetaminophen, benzonatate, dextrose, glucagon, hydrOXYzine HCL, midazolam, midazolam, OLANZapine, oxyCODONE, oxygen, oxygen, trimethobenzamide     Results for orders placed or performed during the hospital encounter of 10/09/23 (from the past 24 hour(s))   Blood Gas Arterial Full Panel   Result Value Ref Range    POCT pH, Arterial 7.53 (H) 7.38 - 7.42 pH    POCT pCO2, Arterial 31 (L) 38 - 42 mm Hg    POCT pO2, Arterial 120 (H) 85 - 95 mm Hg    POCT SO2, Arterial 97 94 - 100 %    POCT Oxy Hemoglobin, Arterial 96.8 94.0 - 98.0 %    POCT Hematocrit Calculated, Arterial 29.0 (L) 36.0 - 46.0 %    POCT Sodium, Arterial 125 (L) 136 - 145 mmol/L    POCT Potassium, Arterial 4.0 3.5 - 5.3 mmol/L    POCT Chloride, Arterial 97 (L) 98 - 107 mmol/L    POCT Ionized Calcium, Arterial 1.17 1.10 - 1.33 mmol/L    POCT Glucose, Arterial 143 (H) 74 - 99 mg/dL    POCT Lactate, Arterial 0.8 0.4 - 2.0 mmol/L    POCT Base Excess,  Arterial 3.4 (H) -2.0 - 3.0 mmol/L    POCT HCO3 Calculated, Arterial 25.9 22.0 - 26.0 mmol/L    POCT Hemoglobin, Arterial 9.6 (L) 12.0 - 16.0 g/dL    POCT Anion Gap, Arterial 6 (L) 10 - 25 mmo/L    Patient Temperature 37.0 degrees Celsius    FiO2 40 %   Urine electrolytes   Result Value Ref Range    Sodium, Urine Random 29 mmol/L    Sodium/Creatinine Ratio 67 Not established. mmol/g Creat    Potassium, Urine Random 26 mmol/L    Potassium/Creatinine Ratio 60 Not established mmol/g Creat    Chloride, Urine Random 25 mmol/L    Chloride/Creatinine Ratio 58 38 - 318 mmol/g creat    Creatinine, Urine Random 43.4 20.0 - 320.0 mg/dL   CBC   Result Value Ref Range    WBC 10.8 4.4 - 11.3 x10*3/uL    nRBC 0.0 0.0 - 0.0 /100 WBCs    RBC 2.46 (L) 4.00 - 5.20 x10*6/uL    Hemoglobin 8.3 (L) 12.0 - 16.0 g/dL    Hematocrit 23.2 (L) 36.0 - 46.0 %    MCV 94 80 - 100 fL    MCH 33.7 26.0 - 34.0 pg    MCHC 35.8 32.0 - 36.0 g/dL    RDW 11.2 (L) 11.5 - 14.5 %    Platelets 56 (L) 150 - 450 x10*3/uL    MPV 12.9 (H) 7.5 - 11.5 fL   Vancomycin   Result Value Ref Range    Vancomycin 11.1 5.0 - 20.0 ug/mL   Renal function panel   Result Value Ref Range    Glucose 149 (H) 74 - 99 mg/dL    Sodium 131 (L) 136 - 145 mmol/L    Potassium 3.9 3.5 - 5.3 mmol/L    Chloride 98 98 - 107 mmol/L    Bicarbonate 24 21 - 32 mmol/L    Anion Gap 13 10 - 20 mmol/L    Urea Nitrogen 11 6 - 23 mg/dL    Creatinine 0.72 0.50 - 1.05 mg/dL    eGFR >90 >60 mL/min/1.73m*2    Calcium 7.8 (L) 8.6 - 10.6 mg/dL    Phosphorus 4.0 2.5 - 4.9 mg/dL    Albumin 2.7 (L) 3.4 - 5.0 g/dL   CBC and Auto Differential   Result Value Ref Range    WBC 10.8 4.4 - 11.3 x10*3/uL    nRBC 0.0 0.0 - 0.0 /100 WBCs    RBC 2.46 (L) 4.00 - 5.20 x10*6/uL    Hemoglobin 8.3 (L) 12.0 - 16.0 g/dL    Hematocrit 23.2 (L) 36.0 - 46.0 %    MCV 94 80 - 100 fL    MCH 33.7 26.0 - 34.0 pg    MCHC 35.8 32.0 - 36.0 g/dL    RDW 11.2 (L) 11.5 - 14.5 %    Platelets 56 (L) 150 - 450 x10*3/uL    MPV 12.9 (H) 7.5 - 11.5  fL    Neutrophils % 73.5 40.0 - 80.0 %    Immature Granulocytes %, Automated 0.7 0.0 - 0.9 %    Lymphocytes % 12.4 13.0 - 44.0 %    Monocytes % 12.2 2.0 - 10.0 %    Eosinophils % 0.7 0.0 - 6.0 %    Basophils % 0.5 0.0 - 2.0 %    Neutrophils Absolute 7.95 (H) 1.20 - 7.70 x10*3/uL    Immature Granulocytes Absolute, Automated 0.08 0.00 - 0.70 x10*3/uL    Lymphocytes Absolute 1.34 1.20 - 4.80 x10*3/uL    Monocytes Absolute 1.32 (H) 0.10 - 1.00 x10*3/uL    Eosinophils Absolute 0.08 0.00 - 0.70 x10*3/uL    Basophils Absolute 0.05 0.00 - 0.10 x10*3/uL   Magnesium   Result Value Ref Range    Magnesium 2.04 1.60 - 2.40 mg/dL   Morphology   Result Value Ref Range    RBC Morphology No significant RBC morphology present    Blood Gas Arterial Full Panel   Result Value Ref Range    POCT pH, Arterial 7.45 (H) 7.38 - 7.42 pH    POCT pCO2, Arterial 37 (L) 38 - 42 mm Hg    POCT pO2, Arterial 129 (H) 85 - 95 mm Hg    POCT SO2, Arterial 100 94 - 100 %    POCT Oxy Hemoglobin, Arterial 97.3 94.0 - 98.0 %    POCT Hematocrit Calculated, Arterial 28.0 (L) 36.0 - 46.0 %    POCT Sodium, Arterial      POCT Potassium, Arterial 3.6 3.5 - 5.3 mmol/L    POCT Chloride, Arterial 102 98 - 107 mmol/L    POCT Ionized Calcium, Arterial 1.20 1.10 - 1.33 mmol/L    POCT Glucose, Arterial 152 (H) 74 - 99 mg/dL    POCT Lactate, Arterial 0.6 0.4 - 2.0 mmol/L    POCT Base Excess, Arterial 1.7 -2.0 - 3.0 mmol/L    POCT HCO3 Calculated, Arterial 25.7 22.0 - 26.0 mmol/L    POCT Hemoglobin, Arterial 9.2 (L) 12.0 - 16.0 g/dL    POCT Anion Gap, Arterial      Patient Temperature 37.0 degrees Celsius    FiO2 30 %                  Assessment/Plan   Principal Problem:    Bradycardia    62 year old female transported from [facility] to CICU on 10/9 for bradycardia 2/2 to CCB intoxication with verapamil. Patient had improved and was pending transfer to floor bed since 10/12 but acutely decompensated evening 10/13 requiring reintubation. Patient became acutely  hypertensive with Bps >200s/100s and acute hypoxic respiratory failure likely 2/2 flash pulmonary edema. Patient had not yet recovered back to her baseline mental status prior to these events. Patient has UTI and gram negative bacteremia so has component of septic shock. Plan to wean sedation and trial extubation this morning.        Neuro/Psych  #Acute encephalopathy, metabolic v infectious   #Anxiety  #Sedation, wean as able   -On precedex gtt @1.5  -On fentanyl gtt @70  -On midazolam @ 3  -pt denies SI  -Psych signed off 10/12, pt not engaging    -PRN zyprexa and atarax, monitor Qtc with EKGs    -Melatonin nightly   -Stop vancomycin (10/14-10/15)  -Continue zosyn (10/14-   -Blood cultures 2/4 gram negative rods  -Urine culture pending      CV  #Recent CCB intoxication with bradycardia, resolved  #Hypertensive emergency, resolved  #Septic shock  #Tachycardia, resolved   -Med tox following peripherally   -Hold home losartan 100mg   -On Levophed, wean as able   -2/4 blood cultures with gram negative rods, has UTI  -S/p 2L IVF 10/14  -Bedside POCUS IVC <2 and collapsible       Resp  #AHRF likely 2/2 flash pulmonary edema  -intubated 10/13 late evening  -was previously intubated 10/9-10/10 for airway protection from CCB intoxication   -ABG this am 7.45/37/129  -On minimal vent settings, weaning sedation to trial extubation this morning      GI  #Transaminitis likely 2/2 shock liver, resolving  #Etoh use history  #Abdominal tenderness, unclear etiology, resolved  -s/p NAC, LFTs downtrended  -CIWA protocol discontinued 10/13, not exhibiting signs of substance withdrawal   -Liver US, not completed as pt refused  -Ammonia level 30   -Monitor abdominal symptoms, no acute findings on KUB  -OG in place     Endo  -no active issues     Renal  #DARIUS, resolved  #Hyponatremia, improving   #Hypokalemia, improving   #Hypomagnesemia, improving   #Lactic acidosis, resolved  -Monitor and replete electrolytes as needed  -lactate 2.3,  now 0.6  -Urine studies consistent with pre renal etiology and hypovolemia      Heme Onc  #Thrombocytopenia, unclear etiology   -Monitor, no s/s of bleeding  -Worsened iso sepsis   -HIV negative, Hep C negative   -Liver US not completed as pt refused  -No abnormal morphology on peripheral smear  -US duplex bilateral LE negative for DVT     ID  #UTI  #Septic shock  #Gram negative bacteremia  -UA with large leukocyte esterase and >50 WBCs  -Urine culture pending  -Blood cultures 2/4 gram negative rods  -Stop vancomycin (10/14-10/15)  -Continue zosyn (10/14-      N: NPO   DVT ppx: lovenox   Vent: 30%/12/350/5, planning for trial of extubation   A: PIV, R radial arterial line (10/13), R internal jugular CVC   Code Status: Full Code  NOK:  Eliseo [phone], daughter Jenifer [phone]                          Katherine Hurst MD

## 2023-10-15 NOTE — PROGRESS NOTES
"Kathleen Hamman is a 62 y.o. female on day 6 of admission presenting with Bradycardia.    Subjective   Patient remains intubated, sedated. No diaphoresis, tremors, does not follow commands       Objective     Physical Exam  Gen: intubated, sedated does not follow commands  HEENT: intubated, ett in place, ncat  Cv: rrr, no mrg, no peripheral edema  Lungs; intubated  Abdomen; nondistended  Ext; no edema  Psych; unable to assess  Neuro; sedated, some spontaneous movements; no tremors  Last Recorded Vitals  Blood pressure 124/77, pulse 100, temperature 36.8 °C (98.2 °F), temperature source Temporal, resp. rate 19, height 1.702 m (5' 7\"), weight 48.8 kg (107 lb 9.4 oz), SpO2 96 %.  Intake/Output last 3 Shifts:  I/O last 3 completed shifts:  In: 3559.4 (72.9 mL/kg) [P.O.:1020; I.V.:1364.4 (28 mL/kg); IV Piggyback:1175]  Out: 250 (5.1 mL/kg) [Urine:250 (0.1 mL/kg/hr)]  Weight: 48.8 kg     Relevant Results  Scheduled medications  enoxaparin, 40 mg, subcutaneous, Daily  folic acid, 1 mg, oral, Daily  lidocaine, 1 patch, transdermal, Daily  [Held by provider] losartan, 100 mg, oral, Daily  melatonin, 5 mg, oral, Nightly  multivitamin with minerals, 1 tablet, oral, Daily  multivitamin with minerals, 1 tablet, oral, Daily  perflutren protein A microsphere, 0.5 mL, intravenous, Once in imaging  piperacillin-tazobactam, 3.375 g, intravenous, q6h  polyethylene glycol, 17 g, oral, Daily  polyethylene glycol, 17 g, oral, Daily  sennosides, 1 tablet, oral, BID  sulfur hexafluoride microsphr, 2 mL, intravenous, Once in imaging  thiamine, 500 mg, intravenous, TID  vancomycin, 15 mg/kg, intravenous, q12h      Continuous medications  dexmedeTOMIDine, 0.1-1.5 mcg/kg/hr, Last Rate: 1.5 mcg/kg/hr (10/15/23 0323)  fentaNYL,  mcg/hr, Last Rate: 70 mcg/hr (10/15/23 0600)  midazolam, 0-20 mg/hr, Last Rate: 3 mg/hr (10/15/23 0600)  norepinephrine, 0-3.3 mcg/kg/min, Last Rate: 0.08 mcg/kg/min (10/15/23 0600)      PRN medications  PRN " medications: acetaminophen, benzonatate, dextrose, glucagon, hydrOXYzine HCL, midazolam, midazolam, OLANZapine, oxyCODONE, oxygen, oxygen, trimethobenzamide          Assessment/Plan   Principal Problem:    Bradycardia    This is a 63 y/o F currently admitted to the CICU after being found unresponsive, hypotensive and bradycardic. She is s/p intubation and TVP placement. Med tox was consulted for further recs     #respiratory failure  #acute toxic encephalopathy  #bradycardia  #hypotension  #hyperglycemia  #hyponatremia  #hypokalemia  #transaminitis        Recommendations for hypotension/bradycardia  -resolved, off of support  Recs for transaminitis  -completed NAC course without issue  Recs for elevated CIWA scores  -dc CIWA, continue thiamine and folate     Pt required reintubation due to agitation/resp distress  Pt was not exhibiting signs concerning for withdrawal   Wean sedation as tolerated  If needing assistance with sedation, please recontact tox     Med tox will follow peripherally, please contact through secure messaging for questions/concerns        Bridgette Eckert, DO

## 2023-10-15 NOTE — PRE-PROCEDURE NOTE
Central Venous Catheter Insertion Procedure Note    Procedure(s) (LRB):  Temporary Pacemaker Insertion (N/A)  Right Heart Cath (N/A)    Indications:  vascular access, vasopressors    Procedure Details   Informed consent was obtained for the procedure, including sedation.  Risks of lung perforation, hemorrhage, arrhythmia, and adverse drug reaction were discussed.     Maximum sterile technique was used including antiseptics, cap, gloves, gown, and hand hygiene.    Under sterile conditions the skin above the on the right internal jugular vein was prepped with betadine and covered with a sterile drape. Local anesthesia was applied to the skin and subcutaneous tissues. A 22-gauge needle was used to identify the vein. An 18-gauge needle was then inserted into the vein. A guide wire was then passed easily through the catheter. There were no arrhythmias. The catheter was then withdrawn. A 7.0 Uzbek triple-lumen was then inserted into the vessel over the guide wire. The catheter was sutured into place.    Findings:  There were no changes to vital signs. Catheter was flushed with 10 cc NS. Patient did tolerate procedure well.    Recommendations:  CXR ordered to verify placement.

## 2023-10-15 NOTE — NURSING NOTE
Patient on CPAP on the ventilator. ABG drawn and given to Fellow Cornelius Smalls. Decision made to extubate patient. Respiratory therapy and fellow at bedside. Patient successfully extubated to 4L NC @ 1700.

## 2023-10-15 NOTE — PROGRESS NOTES
"Vancomycin Dosing by Pharmacy- FOLLOW UP    Kathleen Hamman is a 62 y.o. year old female who Pharmacy has been consulted for vancomycin dosing for other UTI . Based on the patient's indication and renal status this patient is being dosed based on a goal AUC of 400-600.     Renal function is currently stable.    Current vancomycin dose: 750 mg given every 12 hours    Estimated vancomycin AUC on current dose: 533 mg/L.hr     Visit Vitals  /77   Pulse 100   Temp 36.8 °C (98.2 °F) (Temporal)   Resp 19        Lab Results   Component Value Date    CREATININE 0.72 10/15/2023    CREATININE 0.58 10/14/2023    CREATININE 0.44 (L) 10/13/2023    CREATININE 0.47 (L) 10/13/2023        Patient weight is No results found for: \"PTWEIGHT\"    No results found for: \"CULTURE\"     I/O last 3 completed shifts:  In: 3559.4 (72.9 mL/kg) [P.O.:1020; I.V.:1364.4 (28 mL/kg); IV Piggyback:1175]  Out: 250 (5.1 mL/kg) [Urine:250 (0.1 mL/kg/hr)]  Weight: 48.8 kg       Lab Results   Component Value Date    PATIENTTEMP 37.0 10/14/2023    PATIENTTEMP 37.0 10/13/2023    PATIENTTEMP 37.0 10/10/2023        Assessment/Plan    Within goal AUC range. Continue current vancomycin regimen.    This dosing regimen is predicted by [employer] to result in the following pharmacokinetic parameters:  Regimen: 750 mg IV every 12 hours.  Start time: 09:07 on 10/15/2023  Exposure target: AUC24 (range)400-600 mg/L.hr   AUC24,ss: 533 mg/L.hr  Probability of AUC24 > 400: 90 %  Ctrough,ss: 16.9 mg/L  Probability of Ctrough,ss > 20: 29 %  Probability of nephrotoxicity (Lodise JOVAN 2009): 13 %  The next level will be obtained on 10/17 at AM labs. May be obtained sooner if clinically indicated.   Will continue to monitor renal function daily while on vancomycin and order serum creatinine at least every 48 hours if not already ordered.  Follow for continued vancomycin needs, clinical response, and signs/symptoms of toxicity.       Kerri Espinosa, PharmD           "

## 2023-10-15 NOTE — PROCEDURES
Central Line    Date/Time: 10/15/2023 4:41 AM    Performed by: Brice Chacko MD  Authorized by: Brice Chacko MD    Consent:     Consent obtained: Phone consent.    Consent given by: Daughter.    Risks, benefits, and alternatives were discussed: yes      Risks discussed:  Arterial puncture, incorrect placement, bleeding, infection and pneumothorax  Universal protocol:     Procedure explained and questions answered to patient or proxy's satisfaction: yes      Immediately prior to procedure, a time out was called: yes      Patient identity confirmed:  Arm band  Pre-procedure details:     Indication(s): central venous access    Comments:      A time-out was performed. The patient's right neck region was prepped and draped in sterile fashion using chlorhexidine scrub. Anesthesia was achieved with 1% lidocaine. The right internal jugular vein was accessed used a micropuncture needle and sheath. Venous blood was withdrawn and the sheath was advanced into the vein and the needle was withdrawn. A larger guidewire was advanced through the sheath. A small incision was made with a 10 blade scalpel and the sheath was exchanged for a dilator until appropriate dilation was obtained and then a 7 Telugu Telugu triple lumen was advanced over the guidewire  and guidewire was then removed. All ports were jacques back and were flushed. Central line was sutured in place. Chest xray ordered and confirmed position.

## 2023-10-15 NOTE — CONSULTS
Vancomycin Dosing by Pharmacy- Cessation of Therapy    Consult to pharmacy for vancomycin dosing has been discontinued by the prescriber, pharmacy will sign off at this time.    Please call pharmacy if there are further questions or re-enter a consult if vancomycin is resumed.     Kerri Espinosa, PharmD

## 2023-10-15 NOTE — CARE PLAN
Problem: Skin  Goal: Participates in plan/prevention/treatment measures  Outcome: Progressing  Flowsheets (Taken 10/9/2023 2304 by Marisol Noland RN)  Participates in plan/prevention/treatment measures: Elevate heels  Goal: Prevent/manage excess moisture  Outcome: Progressing  Flowsheets (Taken 10/15/2023 0612)  Prevent/manage excess moisture:   Cleanse incontinence/protect with barrier cream   Moisturize dry skin  Goal: Prevent/minimize sheer/friction injuries  Outcome: Progressing  Flowsheets (Taken 10/15/2023 0612)  Prevent/minimize sheer/friction injuries:   Use pull sheet   HOB 30 degrees or less   Turn/reposition every 2 hours/use positioning/transfer devices  Goal: Promote/optimize nutrition  Outcome: Progressing  Flowsheets (Taken 10/15/2023 0612)  Promote/optimize nutrition: Discuss with provider if NPO > 2 days  Goal: Promote skin healing  Outcome: Progressing  Flowsheets (Taken 10/15/2023 0612)  Promote skin healing:   Turn/reposition every 2 hours/use positioning/transfer devices   Protective dressings over bony prominences   Assess skin/pad under line(s)/device(s)   The patient's goals for the shift include      The clinical goals for the shift include pt will remain falls free for entire shift.

## 2023-10-16 PROBLEM — R78.81 BACTEREMIA: Status: ACTIVE | Noted: 2023-10-16

## 2023-10-16 PROBLEM — N39.0 UTI (URINARY TRACT INFECTION): Status: ACTIVE | Noted: 2023-10-16

## 2023-10-16 LAB
ALBUMIN SERPL BCP-MCNC: 2.7 G/DL (ref 3.4–5)
ANION GAP SERPL CALC-SCNC: 10 MMOL/L (ref 10–20)
BACTERIA UR CULT: ABNORMAL
BASOPHILS # BLD AUTO: 0.03 X10*3/UL (ref 0–0.1)
BASOPHILS NFR BLD AUTO: 0.4 %
BUN SERPL-MCNC: 11 MG/DL (ref 6–23)
CALCIUM SERPL-MCNC: 8.1 MG/DL (ref 8.6–10.6)
CHLORIDE SERPL-SCNC: 103 MMOL/L (ref 98–107)
CO2 SERPL-SCNC: 28 MMOL/L (ref 21–32)
CREAT SERPL-MCNC: 0.62 MG/DL (ref 0.5–1.05)
EOSINOPHIL # BLD AUTO: 0.11 X10*3/UL (ref 0–0.7)
EOSINOPHIL NFR BLD AUTO: 1.4 %
ERYTHROCYTE [DISTWIDTH] IN BLOOD BY AUTOMATED COUNT: 11.6 % (ref 11.5–14.5)
GFR SERPL CREATININE-BSD FRML MDRD: >90 ML/MIN/1.73M*2
GLUCOSE SERPL-MCNC: 199 MG/DL (ref 74–99)
HCT VFR BLD AUTO: 23.5 % (ref 36–46)
HGB BLD-MCNC: 7.8 G/DL (ref 12–16)
IMM GRANULOCYTES # BLD AUTO: 0.04 X10*3/UL (ref 0–0.7)
IMM GRANULOCYTES NFR BLD AUTO: 0.5 % (ref 0–0.9)
LYMPHOCYTES # BLD AUTO: 0.8 X10*3/UL (ref 1.2–4.8)
LYMPHOCYTES NFR BLD AUTO: 10.2 %
MAGNESIUM SERPL-MCNC: 2.04 MG/DL (ref 1.6–2.4)
MCH RBC QN AUTO: 33.5 PG (ref 26–34)
MCHC RBC AUTO-ENTMCNC: 33.2 G/DL (ref 32–36)
MCV RBC AUTO: 101 FL (ref 80–100)
MONOCYTES # BLD AUTO: 0.65 X10*3/UL (ref 0.1–1)
MONOCYTES NFR BLD AUTO: 8.3 %
NEUTROPHILS # BLD AUTO: 6.21 X10*3/UL (ref 1.2–7.7)
NEUTROPHILS NFR BLD AUTO: 79.2 %
NRBC BLD-RTO: 0 /100 WBCS (ref 0–0)
PHOSPHATE SERPL-MCNC: 3.6 MG/DL (ref 2.5–4.9)
PLATELET # BLD AUTO: ABNORMAL 10*3/UL
PLATELET CLUMP BLD QL SMEAR: PRESENT
PMV BLD AUTO: 14 FL (ref 7.5–11.5)
POTASSIUM SERPL-SCNC: 3.4 MMOL/L (ref 3.5–5.3)
RBC # BLD AUTO: 2.33 X10*6/UL (ref 4–5.2)
RBC MORPH BLD: NORMAL
SODIUM SERPL-SCNC: 138 MMOL/L (ref 136–145)
STAPHYLOCOCCUS SPEC CULT: NORMAL
WBC # BLD AUTO: 7.8 X10*3/UL (ref 4.4–11.3)

## 2023-10-16 PROCEDURE — 2500000004 HC RX 250 GENERAL PHARMACY W/ HCPCS (ALT 636 FOR OP/ED)

## 2023-10-16 PROCEDURE — 83735 ASSAY OF MAGNESIUM: CPT

## 2023-10-16 PROCEDURE — 85025 COMPLETE CBC W/AUTO DIFF WBC: CPT

## 2023-10-16 PROCEDURE — 2500000001 HC RX 250 WO HCPCS SELF ADMINISTERED DRUGS (ALT 637 FOR MEDICARE OP)

## 2023-10-16 PROCEDURE — 99291 CRITICAL CARE FIRST HOUR: CPT | Performed by: INTERNAL MEDICINE

## 2023-10-16 PROCEDURE — 1200000002 HC GENERAL ROOM WITH TELEMETRY DAILY

## 2023-10-16 PROCEDURE — 80069 RENAL FUNCTION PANEL: CPT

## 2023-10-16 PROCEDURE — 37799 UNLISTED PX VASCULAR SURGERY: CPT | Mod: CMCLAB

## 2023-10-16 RX ORDER — LORAZEPAM 2 MG/ML
1 INJECTION INTRAMUSCULAR EVERY 6 HOURS PRN
Status: DISCONTINUED | OUTPATIENT
Start: 2023-10-16 | End: 2023-10-18 | Stop reason: HOSPADM

## 2023-10-16 RX ORDER — POTASSIUM CHLORIDE 20 MEQ/1
40 TABLET, EXTENDED RELEASE ORAL ONCE
Status: COMPLETED | OUTPATIENT
Start: 2023-10-16 | End: 2023-10-16

## 2023-10-16 RX ORDER — LORAZEPAM 1 MG/1
1 TABLET ORAL EVERY 6 HOURS PRN
Status: DISCONTINUED | OUTPATIENT
Start: 2023-10-16 | End: 2023-10-18 | Stop reason: HOSPADM

## 2023-10-16 RX ORDER — POTASSIUM CHLORIDE 20 MEQ/1
20 TABLET, EXTENDED RELEASE ORAL ONCE
Status: COMPLETED | OUTPATIENT
Start: 2023-10-16 | End: 2023-10-16

## 2023-10-16 RX ORDER — POTASSIUM CHLORIDE 14.9 MG/ML
20 INJECTION INTRAVENOUS
Status: COMPLETED | OUTPATIENT
Start: 2023-10-16 | End: 2023-10-16

## 2023-10-16 RX ADMIN — OXYCODONE HYDROCHLORIDE 5 MG: 5 TABLET ORAL at 04:11

## 2023-10-16 RX ADMIN — DEXMEDETOMIDINE HYDROCHLORIDE 0.2 MCG/KG/HR: 4 INJECTION, SOLUTION INTRAVENOUS at 14:45

## 2023-10-16 RX ADMIN — POTASSIUM CHLORIDE 40 MEQ: 1500 TABLET, EXTENDED RELEASE ORAL at 07:57

## 2023-10-16 RX ADMIN — POTASSIUM CHLORIDE 20 MEQ: 14.9 INJECTION, SOLUTION INTRAVENOUS at 07:55

## 2023-10-16 RX ADMIN — ACETAMINOPHEN 975 MG: 325 TABLET ORAL at 23:49

## 2023-10-16 RX ADMIN — OXYCODONE HYDROCHLORIDE 5 MG: 5 TABLET ORAL at 20:00

## 2023-10-16 RX ADMIN — ACETAMINOPHEN 975 MG: 325 TABLET ORAL at 04:11

## 2023-10-16 RX ADMIN — DEXMEDETOMIDINE HYDROCHLORIDE 0.8 MCG/KG/HR: 4 INJECTION, SOLUTION INTRAVENOUS at 00:48

## 2023-10-16 RX ADMIN — POTASSIUM CHLORIDE 20 MEQ: 14.9 INJECTION, SOLUTION INTRAVENOUS at 05:44

## 2023-10-16 RX ADMIN — PIPERACILLIN SODIUM AND TAZOBACTAM SODIUM 3.38 G: 3; .375 INJECTION, SOLUTION INTRAVENOUS at 20:02

## 2023-10-16 RX ADMIN — POTASSIUM CHLORIDE 20 MEQ: 1500 TABLET, EXTENDED RELEASE ORAL at 05:44

## 2023-10-16 RX ADMIN — Medication 5 MG: at 20:00

## 2023-10-16 RX ADMIN — OXYCODONE HYDROCHLORIDE 5 MG: 5 TABLET ORAL at 13:56

## 2023-10-16 RX ADMIN — PIPERACILLIN SODIUM AND TAZOBACTAM SODIUM 3.38 G: 3; .375 INJECTION, SOLUTION INTRAVENOUS at 01:25

## 2023-10-16 RX ADMIN — PIPERACILLIN SODIUM AND TAZOBACTAM SODIUM 3.38 G: 3; .375 INJECTION, SOLUTION INTRAVENOUS at 07:57

## 2023-10-16 RX ADMIN — PIPERACILLIN SODIUM AND TAZOBACTAM SODIUM 3.38 G: 3; .375 INJECTION, SOLUTION INTRAVENOUS at 14:29

## 2023-10-16 ASSESSMENT — PAIN SCALES - GENERAL
PAINLEVEL_OUTOF10: 0 - NO PAIN
PAINLEVEL_OUTOF10: 6
PAINLEVEL_OUTOF10: 7
PAINLEVEL_OUTOF10: 0 - NO PAIN
PAINLEVEL_OUTOF10: 7

## 2023-10-16 ASSESSMENT — PAIN - FUNCTIONAL ASSESSMENT
PAIN_FUNCTIONAL_ASSESSMENT: 0-10

## 2023-10-16 NOTE — CARE PLAN
The patient's goals for the shift include Go home     The clinical goals for the shift include Nutrition    Over the shift, the patient did not make progress toward the following goals. Barriers to progression include doesn't like the food. Recommendations to address these barriers include Bring in food from home.    Problem: Skin  Goal: Participates in plan/prevention/treatment measures  Outcome: Not Progressing  Goal: Promote/optimize nutrition  Outcome: Not Progressing

## 2023-10-16 NOTE — NURSING NOTE
Pt  is here. She states that she is willing to sign AMA paperwork. Per Dr. Murray he would like Psychiatry to see her first.

## 2023-10-16 NOTE — PROGRESS NOTES
Attending Note:  I have reviewed and evaluated the most recent data and results, personally examined the patient, and formulated the plan of care as presented above. This patient was critically ill and required continued critical care treatment. Teaching and any separately billable procedures are not included in the time calculation.    Kathleen Hamman is a 62 y.o. female on day 8 of admission presenting with Bradycardia, verapamil over dose, and sepsis.    Neurologically: Awake and alert    Cardiovascular:     Cath report:   ECHO: EF:normal, Valvular disease: no sig valvular heart disease      Pulmonary:  === 10/09/23 ===    XR CHEST 1 VIEW    - Impression -  1. Trace/small bilateral pleural effusion with improvement in the  aeration of the lungs when compared to prior study  2. Medical lines and devices as detailed above.    I personally reviewed the images/study, and I agree with the findings  as stated above. This study was interpreted at [place], [place].    Signed by: Cassie Pretty 10/15/2023 5:20 AM  Dictation workstation:   [id number]     Renal: Estimated Creatinine Clearance: 92.6 mL/min (by C-G formula based on SCr of 0.54 mg/dL).  Lab Results   Component Value Date    CREATININE 0.54 10/17/2023       Heme-onc:   Lab Results   Component Value Date    HGB 8.3 (L) 10/17/2023     Lab Results   Component Value Date    PLT 75 (L) 10/17/2023        GI:none    Nutrition: Oral intake    Vascular: None    ID:Broad spectrum abx    Plan:  This is a 69-year-old female who was admitted with bradycardia secondary to verapamil overdose.  While in the hospital patient also noted to have septic shock with a 2 out of 2 blood cultures positive for E. coli and urosepsis.  The patient has been very difficult to manage.  On multiple occasion she has asked to be signing an AMA.  I have spent over 2 hours convincing the patient to stay for her medical management and  "treatment.    At this point we need aggressive IV antibiotics and hydration.  Hold verapamil to allow improvement in the bradycardia.    Billing Provider Critical Care Time: 45 minutes    Red Murray DO            Kathleen Hamman is a 62 y.o. female on day 7 of admission presenting with Bradycardia.    Subjective   Pt examined at bedside today. She reported that she plans to leave today even if it is AMA. Denies any pain, SOB, or discomfort.         Objective   Review of Systems   Constitutional: Negative.    HENT: Negative.     Respiratory: Negative.     Cardiovascular: Negative.    Gastrointestinal: Negative.    Skin: Negative.    Neurological: Negative.         Physical Exam  Constitutional:       Appearance: She is not diaphoretic.   HENT:      Head: Normocephalic and atraumatic.      Nose: Nose normal.   Eyes:      General: No scleral icterus.     Extraocular Movements: Extraocular movements intact.      Conjunctiva/sclera: Conjunctivae normal.      Pupils: Pupils are equal, round, and reactive to light.   Cardiovascular:      Rate and Rhythm: Normal rate and regular rhythm.      Pulses: Normal pulses.   Pulmonary:      Breath sounds: No wheezing or rhonchi.      Comments: Breathing comfortably on RA  Abdominal:      General: Bowel sounds are normal. There is no distension.      Palpations: Abdomen is soft.      Tenderness: There is no abdominal tenderness.   Musculoskeletal:      Right lower leg: No edema.      Left lower leg: No edema.   Skin:     General: Skin is warm and dry.      Coloration: Skin is not jaundiced.   Neurological:      Comments: anxious       Last Recorded Vitals  Blood pressure 136/82, pulse 97, temperature 36 °C (96.8 °F), temperature source Temporal, resp. rate 20, height 1.702 m (5' 7\"), weight 55 kg (121 lb 4.1 oz), SpO2 93 %.  Intake/Output last 3 Shifts:  I/O last 3 completed shifts:  In: 2903.3 (52.8 mL/kg) [I.V.:1053.3 (19.2 mL/kg); NG/GT:100; IV Piggyback:1750]  Out: 5600 " (101.8 mL/kg) [Urine:5600 (2.8 mL/kg/hr)]  Weight: 55 kg     Relevant Results           This patient currently has cardiac telemetry ordered; if you would like to modify or discontinue the telemetry order, click here to go to the orders activity to modify/discontinue the order.  Scheduled medications  enoxaparin, 40 mg, subcutaneous, Daily  folic acid, 1 mg, oral, Daily  lidocaine, 1 patch, transdermal, Daily  [Held by provider] losartan, 100 mg, oral, Daily  melatonin, 5 mg, oral, Nightly  multivitamin with minerals, 1 tablet, oral, Daily  perflutren lipid microspheres, 3 mL of dilution, intravenous, Once in imaging  perflutren protein A microsphere, 0.5 mL, intravenous, Once in imaging  piperacillin-tazobactam, 3.375 g, intravenous, q6h  polyethylene glycol, 17 g, oral, Daily  polyethylene glycol, 17 g, oral, Daily  potassium chloride, 20 mEq, intravenous, q2h  potassium chloride CR, 40 mEq, oral, Once  sennosides, 1 tablet, oral, BID  sulfur hexafluoride microsphr, 2 mL, intravenous, Once in imaging      Continuous medications  dexmedeTOMIDine, 0.1-1.5 mcg/kg/hr, Last Rate: 0.7 mcg/kg/hr (10/16/23 0400)  fentaNYL,  mcg/hr, Last Rate: Stopped (10/15/23 1500)  midazolam, 0-20 mg/hr, Last Rate: Stopped (10/15/23 1500)  norepinephrine, 0-3.3 mcg/kg/min, Last Rate: Stopped (10/15/23 1500)    PRN medications  PRN medications: acetaminophen, benzonatate, dextrose, glucagon, midazolam, midazolam, OLANZapine, oxyCODONE, oxygen, oxygen, trimethobenzamide     Results for orders placed or performed during the hospital encounter of 10/09/23 (from the past 24 hour(s))   Blood Gas Arterial Full Panel   Result Value Ref Range    POCT pH, Arterial 7.45 (H) 7.38 - 7.42 pH    POCT pCO2, Arterial 37 (L) 38 - 42 mm Hg    POCT pO2, Arterial 129 (H) 85 - 95 mm Hg    POCT SO2, Arterial 100 94 - 100 %    POCT Oxy Hemoglobin, Arterial 97.3 94.0 - 98.0 %    POCT Hematocrit Calculated, Arterial 28.0 (L) 36.0 - 46.0 %    POCT Sodium,  Arterial      POCT Potassium, Arterial 3.6 3.5 - 5.3 mmol/L    POCT Chloride, Arterial 102 98 - 107 mmol/L    POCT Ionized Calcium, Arterial 1.20 1.10 - 1.33 mmol/L    POCT Glucose, Arterial 152 (H) 74 - 99 mg/dL    POCT Lactate, Arterial 0.6 0.4 - 2.0 mmol/L    POCT Base Excess, Arterial 1.7 -2.0 - 3.0 mmol/L    POCT HCO3 Calculated, Arterial 25.7 22.0 - 26.0 mmol/L    POCT Hemoglobin, Arterial 9.2 (L) 12.0 - 16.0 g/dL    POCT Anion Gap, Arterial      Patient Temperature 37.0 degrees Celsius    FiO2 30 %   Blood Gas Arterial Full Panel   Result Value Ref Range    POCT pH, Arterial 7.45 (H) 7.38 - 7.42 pH    POCT pCO2, Arterial 38 38 - 42 mm Hg    POCT pO2, Arterial 122 (H) 85 - 95 mm Hg    POCT SO2, Arterial 100 94 - 100 %    POCT Oxy Hemoglobin, Arterial 97.5 94.0 - 98.0 %    POCT Hematocrit Calculated, Arterial 28.0 (L) 36.0 - 46.0 %    POCT Sodium, Arterial 133 (L) 136 - 145 mmol/L    POCT Potassium, Arterial 3.4 (L) 3.5 - 5.3 mmol/L    POCT Chloride, Arterial 103 98 - 107 mmol/L    POCT Ionized Calcium, Arterial 1.19 1.10 - 1.33 mmol/L    POCT Glucose, Arterial 147 (H) 74 - 99 mg/dL    POCT Lactate, Arterial 0.5 0.4 - 2.0 mmol/L    POCT Base Excess, Arterial 2.3 -2.0 - 3.0 mmol/L    POCT HCO3 Calculated, Arterial 26.4 (H) 22.0 - 26.0 mmol/L    POCT Hemoglobin, Arterial 9.2 (L) 12.0 - 16.0 g/dL    POCT Anion Gap, Arterial 7 (L) 10 - 25 mmo/L    Patient Temperature 37.0 degrees Celsius    FiO2 30 %   Renal function panel   Result Value Ref Range    Glucose 199 (H) 74 - 99 mg/dL    Sodium 138 136 - 145 mmol/L    Potassium 3.4 (L) 3.5 - 5.3 mmol/L    Chloride 103 98 - 107 mmol/L    Bicarbonate 28 21 - 32 mmol/L    Anion Gap 10 10 - 20 mmol/L    Urea Nitrogen 11 6 - 23 mg/dL    Creatinine 0.62 0.50 - 1.05 mg/dL    eGFR >90 >60 mL/min/1.73m*2    Calcium 8.1 (L) 8.6 - 10.6 mg/dL    Phosphorus 3.6 2.5 - 4.9 mg/dL    Albumin 2.7 (L) 3.4 - 5.0 g/dL   Magnesium   Result Value Ref Range    Magnesium 2.04 1.60 - 2.40 mg/dL    CBC and Auto Differential   Result Value Ref Range    WBC 7.8 4.4 - 11.3 x10*3/uL    nRBC 0.0 0.0 - 0.0 /100 WBCs    RBC 2.33 (L) 4.00 - 5.20 x10*6/uL    Hemoglobin 7.8 (L) 12.0 - 16.0 g/dL    Hematocrit 23.5 (L) 36.0 - 46.0 %     (H) 80 - 100 fL    MCH 33.5 26.0 - 34.0 pg    MCHC 33.2 32.0 - 36.0 g/dL    RDW 11.6 11.5 - 14.5 %    Platelets      MPV 14.0 (H) 7.5 - 11.5 fL    Neutrophils % 79.2 40.0 - 80.0 %    Immature Granulocytes %, Automated 0.5 0.0 - 0.9 %    Lymphocytes % 10.2 13.0 - 44.0 %    Monocytes % 8.3 2.0 - 10.0 %    Eosinophils % 1.4 0.0 - 6.0 %    Basophils % 0.4 0.0 - 2.0 %    Neutrophils Absolute 6.21 1.20 - 7.70 x10*3/uL    Immature Granulocytes Absolute, Automated 0.04 0.00 - 0.70 x10*3/uL    Lymphocytes Absolute 0.80 (L) 1.20 - 4.80 x10*3/uL    Monocytes Absolute 0.65 0.10 - 1.00 x10*3/uL    Eosinophils Absolute 0.11 0.00 - 0.70 x10*3/uL    Basophils Absolute 0.03 0.00 - 0.10 x10*3/uL   Morphology   Result Value Ref Range    RBC Morphology See Below     Clumped Platelets Present                   Assessment/Plan   Principal Problem:    Bradycardia    62 year old [facility] for bradycardia 2/2 to CCB intoxication with verapamil. Patient had improved and was pending transfer to floor bed since 10/12 but acutely decompensated evening 10/13 requiring reintubation. Patient became acutely hypertensive with Bps >200s/100s and acute hypoxic respiratory failure likely 2/2 flash pulmonary edema. Pt was intubated but has since bene extubated. Patient had UTI and gram negative bacteremia, was on Vanc/Zosyn- Vanc has been d/gagandeep. Pt wants to leave [place].       Neuro/Psych  #Acute encephalopathy, metabolic v infectious   #Anxiety  -pt denies SI  -Psych signed off 10/12, pt not engaging    -PRN Zyprexa, monitor Qtc with EKGs    -Melatonin nightly   -Stop vancomycin (10/14-10/15)  -Continue zosyn (10/14-   -Blood cultures 2/4 gram negative rods  -Urine culture  enteric bacilli   - Consulted psych to determine if pt has capacity, we do not think she does    CV  #Recent CCB intoxication with bradycardia, resolved  #Hypertensive emergency, resolved  #Septic shock  #Tachycardia, resolved   -Med tox following peripherally   -Hold home losartan 100mg   -Off Levo  -2/4 blood cultures with gram negative rods, has UTI  -S/p 2L IVF 10/14        Resp  #AHRF likely 2/2 flash pulmonary edema  -intubated 10/13 late evening, currently breathing well on RA  -was previously intubated 10/9-10/10 for airway protection from CCB intoxication        GI  #Transaminitis likely 2/2 shock liver, resolving  #Etoh use history  #Abdominal tenderness, unclear etiology, resolved  -s/p NAC, LFTs downtrended  -CIWA protocol discontinued 10/13, not exhibiting signs of substance withdrawal   -Liver US, not completed as pt refused  -Ammonia level 30   -Monitor abdominal symptoms, no acute findings on KUB  - Denies any abdominal symptoms       Endo  -no active issues     Renal  #DARIUS, resolved  #Hyponatremia, improving   #Hypokalemia, improving   #Hypomagnesemia, improving   #Lactic acidosis, resolved  -Monitor and replete electrolytes as needed  -Urine studies consistent with pre renal etiology and hypovolemia      Heme Onc  #Thrombocytopenia, unclear etiology   -Monitor, no s/s of bleeding  -Worsened iso sepsis   -HIV negative, Hep C negative   -Liver US not completed as pt refused  -No abnormal morphology on peripheral smear  -US duplex bilateral LE negative for DVT     ID  #UTI  #Septic shock  #Gram negative bacteremia  -UA with large leukocyte esterase and >50 WBCs  -Urine culture gram neg bacilli   -Blood cultures 2/4 gram negative rods  -Stop vancomycin (10/14-10/15)  -Continue zosyn (10/14-      N: NPO   DVT ppx: lovenox   Vent: 30%/12/350/5, planning for trial of extubation   A: PIV, R radial arterial line (10/13), R internal jugular CVC   Code Status: Full Code  NOK:  Eliseo [phone],  daughter [place] [phone]                          Adelia Samuel, DO

## 2023-10-16 NOTE — PROGRESS NOTES
"Kathleen Hamman is a 62 y.o. female on day 7 of admission presenting with Bradycardia. Psychiatry was re-engaged for a capacity evaluation.       Subjective   Patient is seen in cardiac ICU. Patient demands to be discharged home, stating that she has been here for 10 days and \"it's horrible... I want to go home.\" Patient is able to discuss that she has a blood infection and that she signed an \"NDA... MAA\" form to be discharged, but said her other doctor wanted her to have a psychiatric evaluation done before she could go home. States that she hasn't eaten or gotten much rest while in the hospital. Patient contemplates starving herself if she isn't discharged.     She endorses frustration with \"all the staff lying\" when asked to clarify she was she meant by lying she recalls being told multiple doctors saying different things about discharge. She states that she needs to leave as she has a primary care appointment tomorrow and does not wish to reschedule. States that the PCP appointment is for a wellness check and a post-hospitalization follow-up, noting that the PCP already has her hospital documents.     She demands to know why it has taken so long noting the results have taken more than a day. She does not believe that her team is working in her best interests. States that she knows there are options for treatment at home, including antibiotics at home, noting that she promises she won't drive while receiving medication. 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 What's the difference?\" She demands staff call her prescription into the pharmacy noting \"what's the difference?\" She then states \"this is kidnapping--maybe I should laurel this a**.\" When clarified that results could take multiple days, patient becomes furthered disgruntled at which she states \"if the next words out of your mouth aren't 'you're going home' then " "I'm not talking anymore.\" This concludes the interview prematurely.       Objective     Last Recorded Vitals  Blood pressure 144/86, pulse 101, temperature 36.5 °C (97.7 °F), temperature source Temporal, resp. rate 19, height 1.702 m (5' 7\"), weight 55 kg (121 lb 4.1 oz), SpO2 99 %.    Review of Systems    Psychiatric ROS - Adult  Anxiety: Negative  Depression: appetite decreased  Delirium: negative  Psychosis: negative  Mackenzie: negative  Safety Issues:  untreated bacteremia  Psychiatric ROS Comment: None      Mental Status Exam  General: NAD  Appearance: 61 yo female, appears stated age, in ICU bed, with lines attached, disheveled  Attitude: dismissive, flippant  Behavior: initially participatory, becomes irritated  Motor Activity: no abnormal movements noted, JUSTEN gait, no EPS/TD  Speech: increased volume, aggressive tone  Mood: \"I want to go home\"   Affect: irritable, mood-congruent, situation appropriate  Thought Process: perseverates on going home, answering questions appropriately  Thought Content: Does not endorse SI/HI, discharge focused  Thought Perception: Does not endorse AVH, does not appear RIS  Cognition: intact to conversation, grossly orientated, but could not formally assess due to premature interview termination, there is concern that patient is not fully aware of how long she has actually been in the hospital   Insight: poor  Judgement: poor    Psychiatric Risk Assessment  Acute Risk of Harm to Others is Considered: moderate increased impulsivity   Acute Risk of Harm to Self is Considered: moderate increased impulsivity   Further risk assessment could not be determined due to limited interview    Relevant Results  Scheduled medications  enoxaparin, 40 mg, subcutaneous, Daily  folic acid, 1 mg, oral, Daily  lidocaine, 1 patch, transdermal, Daily  [Held by provider] losartan, 100 mg, oral, Daily  melatonin, 5 mg, oral, Nightly  multivitamin with minerals, 1 tablet, oral, Daily  perflutren lipid " microspheres, 3 mL of dilution, intravenous, Once in imaging  perflutren protein A microsphere, 0.5 mL, intravenous, Once in imaging  piperacillin-tazobactam, 3.375 g, intravenous, q6h  polyethylene glycol, 17 g, oral, Daily  polyethylene glycol, 17 g, oral, Daily  sennosides, 1 tablet, oral, BID  sulfur hexafluoride microsphr, 2 mL, intravenous, Once in imaging      Continuous medications  dexmedeTOMIDine, 0.1-1.5 mcg/kg/hr, Last Rate: 0.7 mcg/kg/hr (10/16/23 1200)      PRN medications  PRN medications: acetaminophen, benzonatate, dextrose, glucagon, LORazepam, LORazepam, OLANZapine, oxyCODONE, oxygen, oxygen, trimethobenzamide    Results for orders placed or performed during the hospital encounter of 10/09/23 (from the past 24 hour(s))   Blood Gas Arterial Full Panel   Result Value Ref Range    POCT pH, Arterial 7.45 (H) 7.38 - 7.42 pH    POCT pCO2, Arterial 38 38 - 42 mm Hg    POCT pO2, Arterial 122 (H) 85 - 95 mm Hg    POCT SO2, Arterial 100 94 - 100 %    POCT Oxy Hemoglobin, Arterial 97.5 94.0 - 98.0 %    POCT Hematocrit Calculated, Arterial 28.0 (L) 36.0 - 46.0 %    POCT Sodium, Arterial 133 (L) 136 - 145 mmol/L    POCT Potassium, Arterial 3.4 (L) 3.5 - 5.3 mmol/L    POCT Chloride, Arterial 103 98 - 107 mmol/L    POCT Ionized Calcium, Arterial 1.19 1.10 - 1.33 mmol/L    POCT Glucose, Arterial 147 (H) 74 - 99 mg/dL    POCT Lactate, Arterial 0.5 0.4 - 2.0 mmol/L    POCT Base Excess, Arterial 2.3 -2.0 - 3.0 mmol/L    POCT HCO3 Calculated, Arterial 26.4 (H) 22.0 - 26.0 mmol/L    POCT Hemoglobin, Arterial 9.2 (L) 12.0 - 16.0 g/dL    POCT Anion Gap, Arterial 7 (L) 10 - 25 mmo/L    Patient Temperature 37.0 degrees Celsius    FiO2 30 %   Renal function panel   Result Value Ref Range    Glucose 199 (H) 74 - 99 mg/dL    Sodium 138 136 - 145 mmol/L    Potassium 3.4 (L) 3.5 - 5.3 mmol/L    Chloride 103 98 - 107 mmol/L    Bicarbonate 28 21 - 32 mmol/L    Anion Gap 10 10 - 20 mmol/L    Urea Nitrogen 11 6 - 23 mg/dL     Creatinine 0.62 0.50 - 1.05 mg/dL    eGFR >90 >60 mL/min/1.73m*2    Calcium 8.1 (L) 8.6 - 10.6 mg/dL    Phosphorus 3.6 2.5 - 4.9 mg/dL    Albumin 2.7 (L) 3.4 - 5.0 g/dL   Magnesium   Result Value Ref Range    Magnesium 2.04 1.60 - 2.40 mg/dL   CBC and Auto Differential   Result Value Ref Range    WBC 7.8 4.4 - 11.3 x10*3/uL    nRBC 0.0 0.0 - 0.0 /100 WBCs    RBC 2.33 (L) 4.00 - 5.20 x10*6/uL    Hemoglobin 7.8 (L) 12.0 - 16.0 g/dL    Hematocrit 23.5 (L) 36.0 - 46.0 %     (H) 80 - 100 fL    MCH 33.5 26.0 - 34.0 pg    MCHC 33.2 32.0 - 36.0 g/dL    RDW 11.6 11.5 - 14.5 %    Platelets      MPV 14.0 (H) 7.5 - 11.5 fL    Neutrophils % 79.2 40.0 - 80.0 %    Immature Granulocytes %, Automated 0.5 0.0 - 0.9 %    Lymphocytes % 10.2 13.0 - 44.0 %    Monocytes % 8.3 2.0 - 10.0 %    Eosinophils % 1.4 0.0 - 6.0 %    Basophils % 0.4 0.0 - 2.0 %    Neutrophils Absolute 6.21 1.20 - 7.70 x10*3/uL    Immature Granulocytes Absolute, Automated 0.04 0.00 - 0.70 x10*3/uL    Lymphocytes Absolute 0.80 (L) 1.20 - 4.80 x10*3/uL    Monocytes Absolute 0.65 0.10 - 1.00 x10*3/uL    Eosinophils Absolute 0.11 0.00 - 0.70 x10*3/uL    Basophils Absolute 0.03 0.00 - 0.10 x10*3/uL   Morphology   Result Value Ref Range    RBC Morphology See Below     Clumped Platelets Present           Assessment/Plan   Kathleen Hamman is a 62 y.o. female on day 7 of admission presenting with Bradycardia. Psychiatry was re-engaged for a capacity evaluation.     Patient initially presents as awake and participatory with questions, however there is a notable irritable edge as she responds to questions. With regards to her capacity for leaving AMA in the context of current treatment of bacteremia, there are four criteria that must be met to determine capacity are: (1) communicate a choice (2) understand the relevant information (3) appreciate the situation and its consequences and (4) reason about treatment options.     She is able to communicate a choice, her  understanding of the relevant situation is limited. While she is able to understand that she has a blood infection, she believes that she could take antibiotics at home. She is also expressing frustration with the amount of time that lab results are taking, which may not have clearly communicated with her, however patient appears so frustrated with her hospitalization that she is unable to have a full discussion regarding expectations regarding culture and sensitivities processing time. Therefore, it is not clear that patient has a full understanding of her infection and treatment needs. Furthermore, she does not seem to appreciate the current situation of her illness, as she believes that her PCP would be able to manage her infection in its current form with pending sensitivities and certainly I was not able to discuss death as a consequence of prematurely leaving the hospital due to premature termination of the interview. While she does have willingness to continue treatment, she discusses IV and PO homegoing antibiotics as options, however without further antibiotic susceptibilities there cannot be specific homegoing antibiotics at this time as viable options. As such, patient's decision making capacity currently appears to be impaired by her irritation and irascible nature that is focused on discharge and does not appear to be able to adapt to new information that drives medical recommendations.     As patient does not have capacity to leave at this time, the next of kin becomes her surrogate decision maker. We recommend that primary team have discussion with the surrogate decision maker especially in the context of the patient lacking capacity with setting goals and expectations for treatment while in the hospital.     Impression (list DSM-5 Diagnoses)  #Acute encephalopathy, mixed, likely multifactorial, resolving    #Adjustment disorder      Recommendations:    Safety  - Patient DOES NOT currently meet  criteria for inpatient psychiatric admission.   - Patient LACKS the capacity to leave AMA at this time and thus cannot leave AMA. Call [facility] if patient attempts to leave AMA.  - To evaluate decision-making capacity, recommend use of the Capacity Evaluation Tool under “Documents”   - Patient DOES NOT require a 1:1 sitter from a psychiatric perspective at this time  - Defer to primary team decision for 1:1 sitter for redirection/elopement risk    - Patient's NOK now becomes her surrogate decision maker--would recommend discussing that lab results take time as to set expectations for when discharge decisions and goals can be discussed. May need to consider if home IV ABX are a viable option as well.     Work-up   - Per primary team    Medications   - Agitation recommendations: Lorazepam 1mg PO/IV Q6H PRN agitation   - Please discontinue IM olanzapine as a PRN from previous consult due to concern for respiratory depression with mixed administration with benzodiazepines    Other  - Recommend Delirium Guidelines as below     Ancillary Services  - Recommend , pet/music/art therapy consult as patient tolerates    Delirium Guidelines  Non-Pharmacologic:  - Assess visual and hearing impairments and provide aids and communication boards.  - Assess immobility and advocate for early evaluation and intervention by physical therapy, out of bed when medically indicated, and expeditious removal of tethers.  - Promote physiologic sleep and maintenance of sleep/wake cycle by ensuring blinds are open during the day, maintaining dark/quiet room at night with minimal interruptions, and minimizing daytime naps.  - Minimize room and staff changes.  - Engage the patient in cognitively stimulating activities and provide frequent reorientation.   - Minimize use of restraints to situations where necessary to keep patient and staff safe and to prevent from removing lines, tubes, medical devices, dressings, etc.       Pharmacologic:  - Minimize use of deliriogenic medications such as benzodiazepines, anticholinergic medications, and opiates (while ensuring adequate treatment of pain).  - Assess and treat disruption in bowel and bladder function.   - Assess and treat abnormalities in nutrition and hydration status.      - Discussed recommendations with primary team.  - Psychiatry will continue to follow.       Thank you for allowing us to participate in the care of this patient. Please page [phone] with any questions or concerns.     Patient was discussed with Dr. Garcia who agrees with assessment and plan.     Alexis Lyn MD  Adult Psychiatry, PGY-2  [facility]

## 2023-10-16 NOTE — SIGNIFICANT EVENT
"Capacity Assessment Tool    \"Capacity\" is the \"ability\" to make a decision.  The decision in question must be specific (one decision), relevant to a patient's current condition (appropriate), and timely (neither prospective nor retrospective).    Capacity varies based on knowledge base (explanation/understanding of clinical information), cognitive processing, acute psychiatric illness, and other clinical conditions.    In order to be deemed \"capacitated\" to make a single decision at one point in time, a patient must demonstrate all 4 of the following elements:    *Ability to consistently communicate a choice (consistent over time with adequate information)  *Ability to understand the relevant information (accurate knowledge of condition)  *Ability to appreciate the situation and its consequences (risks/benefits, pros/cons)  *Ability to reason about treatment options (without undue influence of a person or condition, eg. suicidality or acute psychosis)      Current Decision    Clinical issue:  Pt wants to leave AMA. She has bacteremia, when explaining to pt that she requires IV abx for this until susceptibilities return she said that she has a zoom with her PCP for Wednesday and they will figure it out then. Pt is alert and oriented. Explaining risk of sepsis and death and she would like to leave despite this.    Did the appropriate team address relevant information with the patient:  Yes    Date: 10/16/2023    If \"NO\" is selected for appropriate team, then please discuss with the appropriate team.  The appropriate team should be encouraged to address relevant information with the patient AND reevaluate capacity when appropriate.    Capacity Evaluation    Patient demonstrates ability to consistently communicate choice:  Yes - she consistently communicates her desire to leave    Patient demonstrates ability to understand the relevant information:  No She understands she has an infection but does not appear to " "understand the severity     Patient demonstrates ability to appreciate the situation and its consequences:  No Pt states she understands the risks of leaving and potential consequences however she does not appear to understand the severity of her situation.     Patient demonstrates ability to reason about treatment options:  Yes Pt wants to go home and have us prescribe the proper abx once the susceptibilities come back.     If ANY of the above items are answered \"NO,\" the patient LACKS CAPACITY for that specific decision at hand, at that specific time.  Further capacity evaluations can be done as needed.         "

## 2023-10-16 NOTE — CARE PLAN
Problem: Safety - Medical Restraint  Goal: Remains free of injury from restraints (Restraint for Interference with Medical Device)  Outcome: Progressing  Goal: Free from restraint(s) (Restraint for Interference with Medical Device)  Outcome: Progressing     Problem: Pain  Goal: Takes deep breaths with improved pain control throughout the shift  Outcome: Progressing  Goal: Turns in bed with improved pain control throughout the shift  Outcome: Progressing  Goal: Walks with improved pain control throughout the shift  Outcome: Progressing  Goal: Performs ADL's with improved pain control throughout shift  Outcome: Progressing  Goal: Participates in PT with improved pain control throughout the shift  Outcome: Progressing  Goal: Free from opioid side effects throughout the shift  Outcome: Progressing  Goal: Free from acute confusion related to pain meds throughout the shift  Outcome: Progressing     Problem: Mechanical Ventilation  Goal: Oral health is maintained or improved  Outcome: Progressing  Goal: Tracheostomy will be managed safely  Outcome: Progressing  Goal: ET tube will be managed safely  Outcome: Progressing  Goal: Ability to express needs and understand communication  Outcome: Progressing  Goal: Mobility/activity is maintained at optimum level for patient  Outcome: Progressing     Problem: Skin  Goal: Participates in plan/prevention/treatment measures  Outcome: Progressing  Goal: Prevent/manage excess moisture  Outcome: Progressing  Goal: Prevent/minimize sheer/friction injuries  Outcome: Progressing  Goal: Promote/optimize nutrition  Outcome: Progressing  Goal: Promote skin healing  Outcome: Progressing   The patient's goals for the shift include      The clinical goals for the shift include Pt will get extubated

## 2023-10-16 NOTE — NURSING NOTE
"Pt states that she is going home today. I informed her that there weren't any orders saying that she was being discharged. She states that she is leaving \"as soon as my  gets here\".  "

## 2023-10-17 PROBLEM — D69.3 CHRONIC ITP (IDIOPATHIC THROMBOCYTOPENIA) (MULTI): Chronic | Status: ACTIVE | Noted: 2023-10-17

## 2023-10-17 PROBLEM — B96.20 BACTEREMIA DUE TO ESCHERICHIA COLI: Status: ACTIVE | Noted: 2023-10-16

## 2023-10-17 PROBLEM — D61.818 PANCYTOPENIA (MULTI): Status: ACTIVE | Noted: 2023-10-17

## 2023-10-17 LAB
ALBUMIN SERPL BCP-MCNC: 2.8 G/DL (ref 3.4–5)
ALP SERPL-CCNC: 92 U/L (ref 33–136)
ALT SERPL W P-5'-P-CCNC: 32 U/L (ref 7–45)
ANION GAP SERPL CALC-SCNC: 11 MMOL/L (ref 10–20)
AST SERPL W P-5'-P-CCNC: 13 U/L (ref 9–39)
BACTERIA BLD AEROBE CULT: ABNORMAL
BACTERIA BLD AEROBE CULT: ABNORMAL
BACTERIA BLD CULT: ABNORMAL
BACTERIA BLD CULT: ABNORMAL
BILIRUB SERPL-MCNC: 0.5 MG/DL (ref 0–1.2)
BUN SERPL-MCNC: 7 MG/DL (ref 6–23)
CALCIUM SERPL-MCNC: 8.4 MG/DL (ref 8.6–10.6)
CHLORIDE SERPL-SCNC: 107 MMOL/L (ref 98–107)
CO2 SERPL-SCNC: 23 MMOL/L (ref 21–32)
CREAT SERPL-MCNC: 0.54 MG/DL (ref 0.5–1.05)
ERYTHROCYTE [DISTWIDTH] IN BLOOD BY AUTOMATED COUNT: 11.7 % (ref 11.5–14.5)
FERRITIN SERPL-MCNC: 364 NG/ML (ref 8–150)
GFR SERPL CREATININE-BSD FRML MDRD: >90 ML/MIN/1.73M*2
GLUCOSE SERPL-MCNC: 130 MG/DL (ref 74–99)
GRAM STN SPEC: ABNORMAL
GRAM STN SPEC: ABNORMAL
HCT VFR BLD AUTO: 23.6 % (ref 36–46)
HGB BLD-MCNC: 8.3 G/DL (ref 12–16)
IRON SATN MFR SERPL: 22 % (ref 25–45)
IRON SERPL-MCNC: 49 UG/DL (ref 35–150)
MAGNESIUM SERPL-MCNC: 2.11 MG/DL (ref 1.6–2.4)
MCH RBC QN AUTO: 32.8 PG (ref 26–34)
MCHC RBC AUTO-ENTMCNC: 35.2 G/DL (ref 32–36)
MCV RBC AUTO: 93 FL (ref 80–100)
NRBC BLD-RTO: 0 /100 WBCS (ref 0–0)
PLATELET # BLD AUTO: 75 X10*3/UL (ref 150–450)
PMV BLD AUTO: 12.6 FL (ref 7.5–11.5)
POTASSIUM SERPL-SCNC: 3.8 MMOL/L (ref 3.5–5.3)
PROT SERPL-MCNC: 5.2 G/DL (ref 6.4–8.2)
RBC # BLD AUTO: 2.53 X10*6/UL (ref 4–5.2)
SODIUM SERPL-SCNC: 137 MMOL/L (ref 136–145)
TIBC SERPL-MCNC: 225 UG/DL (ref 240–445)
UIBC SERPL-MCNC: 176 UG/DL (ref 110–370)
VANCOMYCIN SERPL-MCNC: 2.4 UG/ML (ref 5–20)
WBC # BLD AUTO: 7.5 X10*3/UL (ref 4.4–11.3)

## 2023-10-17 PROCEDURE — 2500000001 HC RX 250 WO HCPCS SELF ADMINISTERED DRUGS (ALT 637 FOR MEDICARE OP)

## 2023-10-17 PROCEDURE — 97116 GAIT TRAINING THERAPY: CPT | Mod: GP

## 2023-10-17 PROCEDURE — 99291 CRITICAL CARE FIRST HOUR: CPT | Performed by: INTERNAL MEDICINE

## 2023-10-17 PROCEDURE — 96372 THER/PROPH/DIAG INJ SC/IM: CPT

## 2023-10-17 PROCEDURE — 80053 COMPREHEN METABOLIC PANEL: CPT | Mod: CMCLAB

## 2023-10-17 PROCEDURE — 83540 ASSAY OF IRON: CPT | Mod: CMCLAB

## 2023-10-17 PROCEDURE — 2500000004 HC RX 250 GENERAL PHARMACY W/ HCPCS (ALT 636 FOR OP/ED)

## 2023-10-17 PROCEDURE — 83735 ASSAY OF MAGNESIUM: CPT | Mod: CMCLAB

## 2023-10-17 PROCEDURE — 76937 US GUIDE VASCULAR ACCESS: CPT

## 2023-10-17 PROCEDURE — 87040 BLOOD CULTURE FOR BACTERIA: CPT

## 2023-10-17 PROCEDURE — 36415 COLL VENOUS BLD VENIPUNCTURE: CPT

## 2023-10-17 PROCEDURE — 2580000001 HC RX 258 IV SOLUTIONS

## 2023-10-17 PROCEDURE — 37799 UNLISTED PX VASCULAR SURGERY: CPT | Mod: CMCLAB

## 2023-10-17 PROCEDURE — 85027 COMPLETE CBC AUTOMATED: CPT | Mod: CMCLAB

## 2023-10-17 PROCEDURE — 80202 ASSAY OF VANCOMYCIN: CPT

## 2023-10-17 PROCEDURE — 99232 SBSQ HOSP IP/OBS MODERATE 35: CPT

## 2023-10-17 PROCEDURE — 82728 ASSAY OF FERRITIN: CPT | Mod: CMCLAB

## 2023-10-17 PROCEDURE — 97530 THERAPEUTIC ACTIVITIES: CPT | Mod: GP

## 2023-10-17 PROCEDURE — 1200000002 HC GENERAL ROOM WITH TELEMETRY DAILY

## 2023-10-17 RX ORDER — CIPROFLOXACIN 500 MG/1
500 TABLET ORAL EVERY 12 HOURS SCHEDULED
Status: DISCONTINUED | OUTPATIENT
Start: 2023-10-18 | End: 2023-10-18 | Stop reason: HOSPADM

## 2023-10-17 RX ORDER — LOSARTAN POTASSIUM 50 MG/1
100 TABLET ORAL DAILY
Status: DISCONTINUED | OUTPATIENT
Start: 2023-10-17 | End: 2023-10-18 | Stop reason: HOSPADM

## 2023-10-17 RX ORDER — CEFTRIAXONE 1 G/50ML
1 INJECTION, SOLUTION INTRAVENOUS EVERY 24 HOURS
Status: DISCONTINUED | OUTPATIENT
Start: 2023-10-17 | End: 2023-10-17

## 2023-10-17 RX ORDER — NIFEDIPINE 60 MG/1
60 TABLET, FILM COATED, EXTENDED RELEASE ORAL
Status: DISCONTINUED | OUTPATIENT
Start: 2023-10-17 | End: 2023-10-18 | Stop reason: HOSPADM

## 2023-10-17 RX ORDER — MIRTAZAPINE 15 MG/1
7.5 TABLET, FILM COATED ORAL NIGHTLY
Status: DISCONTINUED | OUTPATIENT
Start: 2023-10-17 | End: 2023-10-18 | Stop reason: HOSPADM

## 2023-10-17 RX ORDER — HYDRALAZINE HYDROCHLORIDE 25 MG/1
25 TABLET, FILM COATED ORAL 3 TIMES DAILY
Status: DISCONTINUED | OUTPATIENT
Start: 2023-10-17 | End: 2023-10-18

## 2023-10-17 RX ADMIN — LORAZEPAM 1 MG: 1 TABLET ORAL at 21:28

## 2023-10-17 RX ADMIN — LORAZEPAM 1 MG: 1 TABLET ORAL at 11:41

## 2023-10-17 RX ADMIN — Medication 5 MG: at 20:30

## 2023-10-17 RX ADMIN — MIRTAZAPINE 7.5 MG: 15 TABLET, FILM COATED ORAL at 20:29

## 2023-10-17 RX ADMIN — CEFTRIAXONE SODIUM 1 G: 1 INJECTION, SOLUTION INTRAVENOUS at 11:19

## 2023-10-17 RX ADMIN — ACETAMINOPHEN 975 MG: 325 TABLET ORAL at 17:55

## 2023-10-17 RX ADMIN — PIPERACILLIN SODIUM AND TAZOBACTAM SODIUM 3.38 G: 3; .375 INJECTION, SOLUTION INTRAVENOUS at 01:47

## 2023-10-17 RX ADMIN — OXYCODONE HYDROCHLORIDE 5 MG: 5 TABLET ORAL at 11:37

## 2023-10-17 RX ADMIN — SODIUM CHLORIDE, POTASSIUM CHLORIDE, SODIUM LACTATE AND CALCIUM CHLORIDE 500 ML: 600; 310; 30; 20 INJECTION, SOLUTION INTRAVENOUS at 23:24

## 2023-10-17 RX ADMIN — NIFEDIPINE 60 MG: 60 TABLET, FILM COATED, EXTENDED RELEASE ORAL at 18:56

## 2023-10-17 RX ADMIN — PIPERACILLIN SODIUM AND TAZOBACTAM SODIUM 3.38 G: 3; .375 INJECTION, SOLUTION INTRAVENOUS at 08:34

## 2023-10-17 RX ADMIN — FOLIC ACID 1 MG: 1 TABLET ORAL at 08:34

## 2023-10-17 RX ADMIN — ENOXAPARIN SODIUM 40 MG: 40 INJECTION SUBCUTANEOUS at 08:34

## 2023-10-17 RX ADMIN — HYDRALAZINE HYDROCHLORIDE 25 MG: 25 TABLET, FILM COATED ORAL at 23:29

## 2023-10-17 RX ADMIN — DEXMEDETOMIDINE HYDROCHLORIDE 0.5 MCG/KG/HR: 4 INJECTION, SOLUTION INTRAVENOUS at 01:47

## 2023-10-17 RX ADMIN — Medication 1 TABLET: at 08:34

## 2023-10-17 RX ADMIN — LOSARTAN POTASSIUM 100 MG: 50 TABLET, FILM COATED ORAL at 09:47

## 2023-10-17 RX ADMIN — OXYCODONE HYDROCHLORIDE 5 MG: 5 TABLET ORAL at 03:45

## 2023-10-17 ASSESSMENT — ENCOUNTER SYMPTOMS
RESPIRATORY NEGATIVE: 1
CONSTITUTIONAL NEGATIVE: 1
ABDOMINAL PAIN: 0
SHORTNESS OF BREATH: 0
VOMITING: 0
GASTROINTESTINAL NEGATIVE: 1
CARDIOVASCULAR NEGATIVE: 1
NAUSEA: 0
DIZZINESS: 0
HEADACHES: 1
NEUROLOGICAL NEGATIVE: 1

## 2023-10-17 ASSESSMENT — COGNITIVE AND FUNCTIONAL STATUS - GENERAL
WALKING IN HOSPITAL ROOM: A LITTLE
STANDING UP FROM CHAIR USING ARMS: A LOT
TURNING FROM BACK TO SIDE WHILE IN FLAT BAD: A LITTLE
MOBILITY SCORE: 14
MOVING TO AND FROM BED TO CHAIR: A LOT
CLIMB 3 TO 5 STEPS WITH RAILING: A LOT
MOVING FROM LYING ON BACK TO SITTING ON SIDE OF FLAT BED WITH BEDRAILS: A LOT

## 2023-10-17 ASSESSMENT — PAIN SCALES - GENERAL
PAINLEVEL_OUTOF10: 0 - NO PAIN
PAINLEVEL_OUTOF10: 7

## 2023-10-17 ASSESSMENT — PAIN - FUNCTIONAL ASSESSMENT
PAIN_FUNCTIONAL_ASSESSMENT: 0-10
PAIN_FUNCTIONAL_ASSESSMENT: 0-10

## 2023-10-17 NOTE — PROGRESS NOTES
Attending Note:  I have reviewed and evaluated the most recent data and results, personally examined the patient, and formulated the plan of care as presented above. This patient was critically ill and required continued critical care treatment. Teaching and any separately billable procedures are not included in the time calculation.    Kathleen Hamman is a 62 y.o. female on day 9 of admission presenting with Bradycardia, verapamil over dose, and sepsis.    Neurologically: Awake and alert    Cardiovascular:     Cath report:   ECHO: EF:normal, Valvular disease: no sig valvular heart disease      Pulmonary:  === 10/09/23 ===    XR CHEST 1 VIEW    - Impression -  1. Trace/small bilateral pleural effusion with improvement in the  aeration of the lungs when compared to prior study  2. Medical lines and devices as detailed above.    Renal: Estimated Creatinine Clearance: 92.6 mL/min (by C-G formula based on SCr of 0.54 mg/dL).  Lab Results   Component Value Date    CREATININE 0.54 10/17/2023           Heme-onc:   Lab Results   Component Value Date    HGB 8.3 (L) 10/17/2023     Lab Results   Component Value Date    PLT 75 (L) 10/17/2023     GI:none    Nutrition: Oral intake    Vascular: None    ID:Broad spectrum abx    Plan:  This is a 69-year-old female who was admitted with bradycardia secondary to verapamil overdose.  While in the hospital patient also noted to have septic shock with a 2 out of 2 blood cultures positive for E. coli and urosepsis.  The patient has been very difficult to manage.  On multiple occasion she has asked to be signing an AMA.  I have spent over 2 hours convincing the patient to stay for her medical management and treatment.    At this point we need aggressive IV antibiotics and hydration.  Hold verapamil to allow improvement in the bradycardia. Appreciate psych input.     Billing Provider Critical Care Time: 45 minutes    Red Murray DO    Kathleen Hamman is a 62 y.o. female on day 8 of  "admission presenting with Bradycardia.    Subjective   Patient awake eating breakfast this morning. She states she feels well and wants to go home. On precedex gtt at 5.          Objective   Review of Systems   Constitutional: Negative.    HENT: Negative.     Respiratory: Negative.  Negative for shortness of breath.    Cardiovascular: Negative.  Negative for chest pain.   Gastrointestinal: Negative.  Negative for abdominal pain, nausea and vomiting.   Skin: Negative.    Neurological:  Positive for headaches. Negative for dizziness.        Physical Exam  Constitutional:       General: She is not in acute distress.     Appearance: Normal appearance. She is not ill-appearing or diaphoretic.   HENT:      Head: Normocephalic and atraumatic.      Nose: Nose normal.      Mouth/Throat:      Mouth: Mucous membranes are moist.      Pharynx: Oropharynx is clear.   Eyes:      General: No scleral icterus.     Extraocular Movements: Extraocular movements intact.      Conjunctiva/sclera: Conjunctivae normal.      Pupils: Pupils are equal, round, and reactive to light.   Cardiovascular:      Rate and Rhythm: Regular rhythm. Tachycardia present.      Pulses: Normal pulses.   Pulmonary:      Effort: Pulmonary effort is normal.      Breath sounds: Normal breath sounds. No wheezing or rhonchi.      Comments: Breathing comfortably on RA  Abdominal:      General: Bowel sounds are normal. There is no distension.      Palpations: Abdomen is soft.      Tenderness: There is no abdominal tenderness. There is no guarding or rebound.   Musculoskeletal:      Right lower leg: No edema.      Left lower leg: No edema.   Skin:     General: Skin is warm and dry.      Coloration: Skin is not jaundiced.   Neurological:      Mental Status: She is alert.      Comments: anxious         Last Recorded Vitals  Blood pressure (!) 154/94, pulse 92, temperature 36.8 °C (98.2 °F), temperature source Temporal, resp. rate 24, height 1.702 m (5' 7\"), weight 55 kg " (121 lb 4.1 oz), SpO2 92 %.  Intake/Output last 3 Shifts:  I/O last 3 completed shifts:  In: 248.8 (4.5 mL/kg) [I.V.:248.8 (4.5 mL/kg)]  Out: 3500 (63.6 mL/kg) [Urine:3500 (1.8 mL/kg/hr)]  Weight: 55 kg     Relevant Results           This patient currently has cardiac telemetry ordered; if you would like to modify or discontinue the telemetry order, click here to go to the orders activity to modify/discontinue the order.  Scheduled medications  enoxaparin, 40 mg, subcutaneous, Daily  folic acid, 1 mg, oral, Daily  lidocaine, 1 patch, transdermal, Daily  [Held by provider] losartan, 100 mg, oral, Daily  melatonin, 5 mg, oral, Nightly  multivitamin with minerals, 1 tablet, oral, Daily  perflutren lipid microspheres, 3 mL of dilution, intravenous, Once in imaging  perflutren protein A microsphere, 0.5 mL, intravenous, Once in imaging  piperacillin-tazobactam, 3.375 g, intravenous, q6h  polyethylene glycol, 17 g, oral, Daily  polyethylene glycol, 17 g, oral, Daily  sennosides, 1 tablet, oral, BID  sulfur hexafluoride microsphr, 2 mL, intravenous, Once in imaging      Continuous medications  dexmedeTOMIDine, 0.1-1.5 mcg/kg/hr, Last Rate: 0.5 mcg/kg/hr (10/17/23 0700)    PRN medications  PRN medications: acetaminophen, benzonatate, dextrose, glucagon, LORazepam, LORazepam, oxyCODONE, oxygen, oxygen, trimethobenzamide     Results for orders placed or performed during the hospital encounter of 10/09/23 (from the past 24 hour(s))   CBC   Result Value Ref Range    WBC 7.5 4.4 - 11.3 x10*3/uL    nRBC 0.0 0.0 - 0.0 /100 WBCs    RBC 2.53 (L) 4.00 - 5.20 x10*6/uL    Hemoglobin 8.3 (L) 12.0 - 16.0 g/dL    Hematocrit 23.6 (L) 36.0 - 46.0 %    MCV 93 80 - 100 fL    MCH 32.8 26.0 - 34.0 pg    MCHC 35.2 32.0 - 36.0 g/dL    RDW 11.7 11.5 - 14.5 %    Platelets 75 (L) 150 - 450 x10*3/uL    MPV 12.6 (H) 7.5 - 11.5 fL   Vancomycin   Result Value Ref Range    Vancomycin 2.4 (L) 5.0 - 20.0 ug/mL   Comprehensive metabolic panel   Result Value  Ref Range    Glucose 130 (H) 74 - 99 mg/dL    Sodium 137 136 - 145 mmol/L    Potassium 3.8 3.5 - 5.3 mmol/L    Chloride 107 98 - 107 mmol/L    Bicarbonate 23 21 - 32 mmol/L    Anion Gap 11 10 - 20 mmol/L    Urea Nitrogen 7 6 - 23 mg/dL    Creatinine 0.54 0.50 - 1.05 mg/dL    eGFR >90 >60 mL/min/1.73m*2    Calcium 8.4 (L) 8.6 - 10.6 mg/dL    Albumin 2.8 (L) 3.4 - 5.0 g/dL    Alkaline Phosphatase 92 33 - 136 U/L    Total Protein 5.2 (L) 6.4 - 8.2 g/dL    AST 13 9 - 39 U/L    Bilirubin, Total 0.5 0.0 - 1.2 mg/dL    ALT 32 7 - 45 U/L   Magnesium   Result Value Ref Range    Magnesium 2.11 1.60 - 2.40 mg/dL                  Assessment/Plan   Principal Problem:    Bradycardia  Active Problems:    Bacteremia    UTI (urinary tract infection)    62 year old female transported from [facility] to CICU on 10/9 for bradycardia 2/2 to CCB intoxication with verapamil. Patient had improved then decompensated 10/13 and found to have septic shock from E coli UTI and bacteremia, now significantly improved on broad spectrum antibiotics with vanc (since discontinued) and zosyn.        Neuro/Psych  #Acute encephalopathy, likely infectious, improved   #Anxiety  -pt denies SI  -Psych signed off 10/12, reengaged 10/16  -PRN Zyprexa discontinued per psych, PRN ativan per psych  -Melatonin nightly   -Stop vancomycin (10/14-10/15)  -Continue zosyn (10/14-   -Blood cultures 2/4 E coli  -Urine culture >100,000 E coli    CV  #Recent CCB intoxication with bradycardia, resolved  #Hypertensive emergency, resolved  #Septic shock  #Tachycardia  -Med tox following peripherally   -Consider restarting home losartan 100mg   -Off Levo  -2/4 blood cultures with E coli from UTI  -S/p 2L IVF 10/14        Resp  #AHRF likely 2/2 flash pulmonary edema, resolved  -Was previously intubated 10/9-10/10 for airway protection from CCB intoxication   -Was intubated 10/13, extubated 10/15  -Breathing comfortably on RA       GI  #Transaminitis likely 2/2 shock  liver, resolved  #Etoh use history  -s/p NAC, LFTs downtrended  -CIWA protocol discontinued 10/13, not exhibiting signs of substance withdrawal   -Liver US, not completed as pt refused       Endo  -no active issues     Renal  #DARIUS, resolved  #Hyponatremia, resolved   #Hypokalemia, resolved   #Hypomagnesemia, resolved   #Lactic acidosis, resolved  -Monitor and replete electrolytes as needed  -Urine studies consistent with pre renal etiology and hypovolemia      Heme Onc  #Thrombocytopenia 2/2 ITP    #Anemia, chronic  -Monitor, no s/s of bleeding  -Worsened iso sepsis   -HIV negative, Hep C negative   -Liver US not completed as pt refused  -No abnormal morphology on peripheral smear  -US duplex bilateral LE negative for DVT   -Iron studies pending  -Pt confirmed today she has a long standing history of ITP, sees Dr. Erika Syed at [facility] and is under surveillance     ID  #E coli UTI  #Septic shock, resolving   #E coli bacteremia  -UA with large leukocyte esterase and >50 WBCs  -Urine culture E coli  -Blood cultures 2/4 E coli  -Stop vancomycin (10/14-10/15)  -Continue zosyn (10/14-   -Narrow antibiotics based on sensitivities   -Repeat cultures 10/17     N: regular   DVT ppx: lovenox   A: PIV, R radial arterial line (10/13), R internal jugular CVC   Code Status: Full Code  NOK:  Eliseo [phone], daughter Jenifer [phone]                          Katherine Hurst MD

## 2023-10-17 NOTE — CARE PLAN
Problem: Safety - Medical Restraint  Goal: Remains free of injury from restraints (Restraint for Interference with Medical Device)  Outcome: Progressing  Goal: Free from restraint(s) (Restraint for Interference with Medical Device)  Outcome: Progressing     Problem: Pain  Goal: Takes deep breaths with improved pain control throughout the shift  Outcome: Progressing  Goal: Turns in bed with improved pain control throughout the shift  Outcome: Progressing  Goal: Walks with improved pain control throughout the shift  Outcome: Progressing  Goal: Performs ADL's with improved pain control throughout shift  Outcome: Progressing  Goal: Participates in PT with improved pain control throughout the shift  Outcome: Progressing  Goal: Free from opioid side effects throughout the shift  Outcome: Progressing  Goal: Free from acute confusion related to pain meds throughout the shift  Outcome: Progressing     Problem: Skin  Goal: Participates in plan/prevention/treatment measures  Outcome: Progressing  Goal: Prevent/manage excess moisture  Outcome: Progressing  Goal: Prevent/minimize sheer/friction injuries  Outcome: Progressing  Goal: Promote/optimize nutrition  Outcome: Progressing  Goal: Promote skin healing  Outcome: Progressing   The patient's goals for the shift include      The clinical goals for the shift include Pt will get extubated         Oriented - self; Oriented - place; Oriented - time

## 2023-10-17 NOTE — PROGRESS NOTES
Physical Therapy    Physical Therapy Treatment    Patient Name: Kathleen Hamman  MRN: [id number]  Today's Date: 10/17/2023  In Time: 10:09  Out Time: 10:34  SPT under direct supervision of PT           Assessment/Plan   PT Assessment  PT Assessment Results: Decreased strength, Decreased range of motion, Decreased endurance, Impaired balance, Decreased mobility, Decreased coordination, Decreased cognition, Impaired judgement, Decreased safety awareness  Rehab Prognosis: Good  Evaluation/Treatment Tolerance: Patient limited by fatigue  Medical Staff Made Aware: Yes  End of Session Communication: Bedside nurse  End of Session Patient Position: Bed, 3 rail up, Alarm off, not on at start of session  PT Plan  Inpatient/Swing Bed or Outpatient: Inpatient  PT Plan  Treatment/Interventions: Bed mobility, Transfer training, Gait training, Balance training, Neuromuscular re-education, Strengthening, Endurance training, Range of motion, Therapeutic exercise, Therapeutic activity, Home exercise program  PT Plan: Skilled PT  PT Frequency: 4 times per week  PT Discharge Recommendations: High intensity level of continued care  PT Recommended Transfer Status: Assist x1      General Visit Information:   PT  Visit  PT Received On: 10/17/23  Response to Previous Treatment: Patient with no complaints from previous session.  General  Reason for Referral: Patient sent by helicopter from [facility] for bradycardia following verapamil intoxication, s/p TVP (removed), was intubated now extubated (10/10)  Past Medical History Relevant to Rehab: no PMHx  Family/Caregiver Present: No  Co-Treatment: OT  Co-Treatment Reason: 2/2 decreased activity tolerance  Prior to Session Communication: Bedside nurse  Patient Position Received: Bed, 3 rail up, Alarm off, not on at start of session  General Comment: Pt agreeable to PT with bouts of frustration 2/2 decreased privacy. RN removed most lines prior to tx so pt was just on telemetry and an IV. Pt was  anxious to walk and expressed fear of falling.    Subjective     Precautions:  Precautions  Medical Precautions: Cardiac precautions, Fall precautions    Vital Signs:  Vital Signs  Heart Rate:  (PRE: 96 DURING: VSS POST: 111)  Resp:  (PRE: 15 DURING: VSS POST: 18)  SpO2:  (PRE: 100 DURIN POST: 99)  BP:  (PRE: 154/112 (supine) DURIN/110 (sitting) POST: 106/110 (supine))  MAP (mmHg):  (PRE:123 DURING: >120 POST: 127, 125)  BP Location: Right arm  BP Method: Automatic    Objective     Pain:  Pain Assessment  Pain Score: 0 - No pain    Cognition:  Cognition    Arousal/Alertness: Appropriate responses to stimuli  Orientation Level: Oriented X4  Following Commands: Follows one step commands with repetition  Insight: (decreased insight into hospital course)    Postural Control:  Postural Control  Postural Control: Within Functional Limits    Activity Tolerance:  Activity Tolerance  Activity Tolerance Comments: Pt had fatigue with activity and tremors with all mobilization    Treatments:  Therapeutic Exercise  Therapeutic Exercise Performed: Yes  Therapeutic Exercise Activity 1: B ankle pumps x20 in supine    Therapeutic Activity  Therapeutic Activity Performed: Yes  Therapeutic Activity 1: Sitting with trunk unsupported for 8 minutes total (pt able to sit with feet on ground and trunk unsupported with no LOB. Supervision required.)  Therapeutic Activity 2: Dynamic standing for 3 min (Pt was able to stand with intermitant UE support from FWW and counter. Pt required CGA and had no LOB)    Bed Mobility  Bed Mobility: Yes  Bed Mobility 1  Bed Mobility 1: Supine to sitting  Level of Assistance 1: Moderate assistance (Pt requiring assist to pull herself up and to adjust hips to be square in the bed with feet on the floor)  Bed Mobility Comments 1: Pt became dizzy in sitting that stabilized with rest. VSS  Bed Mobility 2  Bed Mobility  2: Sitting to supine  Level of Assistance 2: Close supervision  Bed Mobility  Comments 2: No UE support needed      Ambulation/Gait Training  Ambulation/Gait Training Performed: Yes  Ambulation/Gait Training 1  Surface 1: Level tile  Device 1: Rolling walker  Assistance 1: Minimum assistance (x1)  Quality of Gait 1:  (decreased paula, narrow GLENROY, decreased stride length, forward flexed posture)  Comments/Distance (ft) 1: 8 ft (Pt had tremors throughout amb)  Ambulation/Gait Training 2  Surface 2: Level tile  Device 2: Rolling walker  Assistance 2: Minimum assistance (x1)  Quality of Gait 2:  (decreased paula, narrow GLENROY, decreased stride length, forward flexed posture)  Comments/Distance (ft) 2: 6 ft (Pt had tremors throughout amb)  Ambulation/Gait Training 3  Surface 3: Level tile  Device 3: Rolling walker  Assistance 3: Minimum assistance (x1)  Quality of Gait 3:  (decreased paula, narrow GLENROY, decreased stride length, forward flexed posture)  Comments/Distance (ft) 3: 8 ft (Pt had tremors throughout amb)  Transfers  Transfer: Yes  Transfer 1  Transfer From 1: Sit to  Transfer to 1: Stand  Technique 1: Sit to stand  Transfer Device 1: Walker  Transfer Level of Assistance 1: Minimum assistance (x1)  Trials/Comments 1: cueing used for proper hand placement  Transfers 2  Transfer From 2: Stand to  Transfer to 2: Sit  Technique 2: Stand to sit  Transfer Device 2: Walker  Transfer Level of Assistance 2: Minimum assistance (x1)  Trials/Comments 2: cueing used for proper hand placement  Transfers 3  Transfer From 3: Sit to  Transfer to 3: Stand (from low surface)  Technique 3: Sit to stand  Transfer Device 3: Walker  Transfer Level of Assistance 3: Moderate assistance (x1)  Trials/Comments 3: Pt reported fear of bearing weight and falling  Transfers 4  Transfer From 4: Stand to  Transfer to 4: Sit  Technique 4: Stand to sit  Transfer Device 4: Walker  Transfer Level of Assistance 4: Moderate assistance (x1)  Trials/Comments 4: cueing required to sit controlled. Pt reported feeling unstable  while descending to lowered surface    Outcome Measures:  [facility] Basic Mobility  Turning from your back to your side while in a flat bed without using bedrails: A lot  Moving from lying on your back to sitting on the side of a flat bed without using bedrails: A little  Moving to and from bed to chair (including a wheelchair): A lot  Standing up from a chair using your arms (e.g. wheelchair or bedside chair): A lot  To walk in hospital room: A little  Climbing 3-5 steps with railing: A lot  Basic Mobility - Total Score: 14    Confusion Assessment Method-ICU (CAM-ICU)  Feature 1: Acute Onset or Fluctuating Course: Negative  Feature 2: Inattention: Negative  Feature 3: Altered Level of Consciousness: Negative  Feature 4: Disorganized Thinking: Negative  Overall CAM-ICU: Negative    FSS-ICU  Ambulation: Walks <50 feet with any assistance x1 or walks any distance with assistance x2 people  Rolling: Supervision or set-up only  Sitting: Supervision or set-up only  Transfer Sit-to-Stand: Moderate assistance (performs 50 - 74% of task)  Transfer Supine-to-Sit: Moderate assistance (performs 50 - 74% of task)  Total Score: 17      E = Exercise and Early Mobility  Current Activity: Ambulating in room  San [place]itation Sedation Scale  [place]itation Sedation Scale (RASS): Alert and calm    Education Documentation  Mobility Training, taught by ROHINI Lomas at 10/17/2023  1:42 PM.  Learner: Patient  Readiness: Acceptance  Method: Explanation  Response: Verbalizes Understanding, Demonstrated Understanding    Education Comments  No comments found.      OP EDUCATION:  Education  Individual(s) Educated: Patient  Education Provided: Fall Risk, Home Safety  Patient/Caregiver Demonstrated Understanding: yes  Patient Response to Education: Patient/Caregiver Verbalized Understanding of Information    Encounter Problems       Encounter Problems (Active)       Balance       STG - Maintains dynamic standing balance with LRD with  CGA (Progressing)       Start:  10/10/23    Expected End:  10/24/23            STG - Maintains independent dynamic sitting balance without upper extremity support (Progressing)       Start:  10/10/23    Expected End:  10/24/23               Mobility       STG - Patient will ambulate 250 ft with LRD and CGA (Progressing)       Start:  10/10/23    Expected End:  10/24/23               Transfers       STG - Patient will perform bed mobility independently with no UE support (Progressing)       Start:  10/10/23    Expected End:  10/24/23            STG - Patient will transfer sit to and from stand with LRD and CGA (Progressing)       Start:  10/10/23    Expected End:  10/24/23

## 2023-10-17 NOTE — PROGRESS NOTES
"Kathleen Hamman is a 62 y.o. female on day 8 of admission presenting with Bradycardia. Psychiatry was re-engaged for a capacity evaluation and determined to lack capacity to leave AMA on the afternoon of 10/16/2023. Psychiatry continues to follow for agitation and anxiety management.       Subjective   Patient seen at bedside with psychiatry attending and pharmacist. Patient endorses frustration with hospitalization. Endorses poor sleep, roughly 1.5 hours. Asks about bacterial results and becomes irritated when results were still pending. States that she was able to reschedule her PCP appointment for Thursday and is adamant about leaving on Wednesday to make the appointment. 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 When discussing medication options for sleep, she reports trazodone as it did not work previously and had a/e to ambien, but is amenable to a short-term trial of mirtazapine. Endorses having her phone to keep her occupied. Denies SI/HI/AVH.        Objective     Last Recorded Vitals  Blood pressure (!) 149/95, pulse 99, temperature 36.8 °C (98.2 °F), temperature source Temporal, resp. rate 13, height 1.702 m (5' 7\"), weight 55 kg (121 lb 4.1 oz), SpO2 94 %.    Review of Systems    Psychiatric ROS - Adult  Anxiety: Negative  Depression: negative  Delirium: negative  Psychosis: negative  Mackenzie: negative  Safety Issues: none      Mental Status Exam  General: NAD  Appearance: 61 yo female, appears stated age, in ICU bed, with lines attached, disheveled  Attitude: initially annoyed, becomes pleasant and collaborative during interview  Behavior: appropriate eye contact  Motor Activity: no abnormal movements noted, JUSTEN gait, no EPS/TD  Speech: conversational volume, aggressive tone  Mood: \"Can't you tell I'm pissed off\"   Affect: annoyed, but later calm smiling and laughing, mood-congruent, situation appropriate  Thought Process: organized, linear, answers " appropriately  Thought Content: Does not endorse SI/HI, discharge focused  Thought Perception: Does not endorse AVH, does not appear RIS  Cognition: intact to conversation, grossly orientated  Insight: poor, improving  Judgement: poor    Psychiatric Risk Assessment  Acute Risk of Harm to Others is Considered: moderate elevated impulsivity   Acute Risk of Harm to Self is Considered: moderate elevated impulsivity    Relevant Results  Scheduled medications  enoxaparin, 40 mg, subcutaneous, Daily  folic acid, 1 mg, oral, Daily  lidocaine, 1 patch, transdermal, Daily  losartan, 100 mg, oral, Daily  melatonin, 5 mg, oral, Nightly  mirtazapine, 7.5 mg, oral, Nightly  multivitamin with minerals, 1 tablet, oral, Daily  perflutren lipid microspheres, 3 mL of dilution, intravenous, Once in imaging  perflutren protein A microsphere, 0.5 mL, intravenous, Once in imaging  piperacillin-tazobactam, 3.375 g, intravenous, q6h  polyethylene glycol, 17 g, oral, Daily  polyethylene glycol, 17 g, oral, Daily  sennosides, 1 tablet, oral, BID  sulfur hexafluoride microsphr, 2 mL, intravenous, Once in imaging      Continuous medications  dexmedeTOMIDine, 0.1-1.5 mcg/kg/hr, Last Rate: 0.5 mcg/kg/hr (10/17/23 0700)      PRN medications  PRN medications: acetaminophen, benzonatate, dextrose, glucagon, LORazepam, LORazepam, oxyCODONE, oxygen, oxygen, trimethobenzamide    Results for orders placed or performed during the hospital encounter of 10/09/23 (from the past 24 hour(s))   CBC   Result Value Ref Range    WBC 7.5 4.4 - 11.3 x10*3/uL    nRBC 0.0 0.0 - 0.0 /100 WBCs    RBC 2.53 (L) 4.00 - 5.20 x10*6/uL    Hemoglobin 8.3 (L) 12.0 - 16.0 g/dL    Hematocrit 23.6 (L) 36.0 - 46.0 %    MCV 93 80 - 100 fL    MCH 32.8 26.0 - 34.0 pg    MCHC 35.2 32.0 - 36.0 g/dL    RDW 11.7 11.5 - 14.5 %    Platelets 75 (L) 150 - 450 x10*3/uL    MPV 12.6 (H) 7.5 - 11.5 fL   Vancomycin   Result Value Ref Range    Vancomycin 2.4 (L) 5.0 - 20.0 ug/mL   Comprehensive  metabolic panel   Result Value Ref Range    Glucose 130 (H) 74 - 99 mg/dL    Sodium 137 136 - 145 mmol/L    Potassium 3.8 3.5 - 5.3 mmol/L    Chloride 107 98 - 107 mmol/L    Bicarbonate 23 21 - 32 mmol/L    Anion Gap 11 10 - 20 mmol/L    Urea Nitrogen 7 6 - 23 mg/dL    Creatinine 0.54 0.50 - 1.05 mg/dL    eGFR >90 >60 mL/min/1.73m*2    Calcium 8.4 (L) 8.6 - 10.6 mg/dL    Albumin 2.8 (L) 3.4 - 5.0 g/dL    Alkaline Phosphatase 92 33 - 136 U/L    Total Protein 5.2 (L) 6.4 - 8.2 g/dL    AST 13 9 - 39 U/L    Bilirubin, Total 0.5 0.0 - 1.2 mg/dL    ALT 32 7 - 45 U/L   Magnesium   Result Value Ref Range    Magnesium 2.11 1.60 - 2.40 mg/dL       Assessment/Plan   Kathleen Hamman is a 62 y.o. female on day 7 of admission presenting with Bradycardia. Psychiatry was re-engaged for a capacity evaluation and determined to lack capacity to leave AMA on the afternoon of 10/16/2023. Psychiatry continues to follow for agitation and anxiety management.     Patient shows slight improvement in mood and affect as she has begun to accept the nature of her hospitalization. Although she has rescheduled her appointment with her PCP, the appointment is a few days from now, but may pose logistical difficulties if there is not yet  homegoing plan for antibiotics. She did not require any PRNs for agitation overnight. Furthermore, she is showing engagement and cooperation with psychiatry team as she is willing to discuss medications for sleep and ensuing risk and benefits.     Impression   #Acute encephalopathy, mixed, likely multifactorial, resolving    #Adjustment disorder     Recommendations:     Safety  - Patient DOES NOT currently meet criteria for inpatient psychiatric admission.   - To evaluate decision-making capacity, recommend use of the Capacity Evaluation Tool under “Documents”   - Patient DOES NOT require a 1:1 sitter from a psychiatric perspective at this time  - Defer to primary team decision for 1:1 sitter for  redirection/elopement risk     - Should patient be determined to lack capacity, Patient's NOK becomes her surrogate decision maker--would recommend discussing that lab results take time as to set expectations for when discharge decisions and goals can be discussed. May need to consider if home IV ABX are a viable option as well.      Work-up   - Per primary team     Medications   - Mirtazapine 7.5mg PO at bedtime for sleep promotion  - Agitation recommendations: Lorazepam 1mg PO/IV Q6H PRN agitation -- currently on precedex  - Previous olanzapine PRN recs discontinued     Other  - Recommend Delirium Guidelines as below      Ancillary Services  - Patient currently declines , pet/music/art therapy consult, consider engaging if patient tolerates     Delirium Guidelines  Non-Pharmacologic:  - Assess visual and hearing impairments and provide aids and communication boards.  - Assess immobility and advocate for early evaluation and intervention by physical therapy, out of bed when medically indicated, and expeditious removal of tethers.  - Promote physiologic sleep and maintenance of sleep/wake cycle by ensuring blinds are open during the day, maintaining dark/quiet room at night with minimal interruptions, and minimizing daytime naps.  - Minimize room and staff changes.  - Engage the patient in cognitively stimulating activities and provide frequent reorientation.   - Minimize use of restraints to situations where necessary to keep patient and staff safe and to prevent from removing lines, tubes, medical devices, dressings, etc.      Pharmacologic:  - Minimize use of deliriogenic medications such as benzodiazepines, anticholinergic medications, and opiates (while ensuring adequate treatment of pain).  - Assess and treat disruption in bowel and bladder function.   - Assess and treat abnormalities in nutrition and hydration status.       - Discussed recommendations with primary team.  - Psychiatry will continue to  follow.      Thank you for allowing us to participate in the care of this patient. Please page [phone] with any questions or concerns.      Patient was seen and discussed with Dr. Garcia who agrees with assessment and plan.      Alexis Lyn MD  Adult Psychiatry, PGY-2  [facility]

## 2023-10-17 NOTE — NURSING NOTE
Called to patient bedside for PIV, BUE assessed with ultrasound.  No sustainable veins noted patient would need alternative access.  Bedside RN made aware.

## 2023-10-17 NOTE — PROGRESS NOTES
"Nutrition Assessment Note  Assessment    Assessment:       History: Pt is a 62 y.o. female with a primary dx of Bradycardia Pm hx of chronic ITP  Food and Nutrient History  Energy Intake: Good > 75 %  Food and Nutrient History: Pt reports to have a good appetite and to be able to finish most of meals. Pt reported some weigh loss over the last year unsure of how much, UBW reported to be around 125#. Pt denied any food allergies. No other nutrition related questions or concerns reported.    Medications: All med's reviewed noting- enoxaparin, folic acid, losartan, melatonin, Remeron, multivitamin w minerals, polyethylene glycol    Labs: Reviewed   Results for orders placed or performed during the hospital encounter of 10/09/23 (from the past 24 hour(s))      Comprehensive metabolic panel   Result Value Ref Range    Glucose 130 (H) 74 - 99 mg/dL    Sodium 137 136 - 145 mmol/L    Potassium 3.8 3.5 - 5.3 mmol/L    Chloride 107 98 - 107 mmol/L    Bicarbonate 23 21 - 32 mmol/L    Anion Gap 11 10 - 20 mmol/L    Urea Nitrogen 7 6 - 23 mg/dL    Creatinine 0.54 0.50 - 1.05 mg/dL    eGFR >90 >60 mL/min/1.73m*2    Calcium 8.4 (L) 8.6 - 10.6 mg/dL    Albumin 2.8 (L) 3.4 - 5.0 g/dL    Alkaline Phosphatase 92 33 - 136 U/L    Total Protein 5.2 (L) 6.4 - 8.2 g/dL    AST 13 9 - 39 U/L    Bilirubin, Total 0.5 0.0 - 1.2 mg/dL    ALT 32 7 - 45 U/L   Magnesium   Result Value Ref Range    Magnesium 2.11 1.60 - 2.40 mg/dL   Iron and TIBC   Result Value Ref Range    Iron 49 35 - 150 ug/dL    UIBC 176 110 - 370 ug/dL    TIBC 225 (L) 240 - 445 ug/dL    % Saturation 22 (L) 25 - 45 %   Ferritin   Result Value Ref Range    Ferritin 364 (H) 8 - 150 ng/mL           Anthropometrics:  Height: 170.2 cm (5' 7.01\")  Weight: 55 kg (121 lb 4.1 oz)  BMI (Calculated): 18.99    Weight Hx:  10/17/23 1443 55 kg (121 lb 4.1 oz) --   10/16/23 0411 55 kg (121 lb 4.1 oz) --   10/15/23 0600 48.9 kg (107 lb 12.9 oz) --   10/14/23 0600 48.8 kg (107 lb 9.4 oz) -- "   10/14/23 0400 48.8 kg (107 lb 9.4 oz) --   10/13/23 0437 60.4 kg (133 lb 2.5 oz) --   10/12/23 0555 60.2 kg (132 lb 11.5 oz) --   10/11/23 0532 60.2 kg (132 lb 11.5 oz) --   10/10/23 0600 59.9 kg (132 lb 0.9 oz) --   10/09/23 1124 56.8 kg (125 lb 3.5 oz) --   10/09/23 0900 56.8 kg (125 lb 3.5 oz) 57 kg (125 lb 10.6 oz)     Weight Change  Weight History / % Weight Change: Noting a 1.8kg/ 3.2% sig. wt. loss x 1 week  Interpretation of Weight Loss: >2% in 1 week    Estimated Energy Needs  Total Energy Estimated Needs (kcal): 1650 kcal  Total Estimated Energy Need per Day (kcal/kg): 30 kcal/kg    Estimated Protein Needs  Total Protein Estimated Needs (g): 66 g  Total Protein Estimated Needs (g/kg): 1.2 g/kg    Estimated Fluid Needs  Total Fluid Estimated Needs (mL): 1650 mL  Method for Estimating Needs: 1 mL per kcal    Nutrition Focused Physical Findings:  Subcutaneous Fat Loss  Orbital Fat Pads: Mild-Moderate (slight dark circles and slight hollowing)  Buccal Fat Pads: Mild-Moderate (flat cheeks, minimal bounce)  Triceps: Defer  Ribs: Defer  Muscle Wasting  Temporalis: Mild-Moderate (slight depression)  Pectoralis (Clavicular Region): Mild-Moderate (some protrusion of clavicle)  Deltoid/Trapezius: Defer  Interosseous: Defer  Trapezius/Infraspinatus/Supraspinatus (Scapular Region): Defer  Quadriceps: Defer  Gastrocnemius: Defer  Edema  Edema: none  Edema Location: no edema noted    Diagnosis   Diagnosis:  Malnutrition Diagnosis  Patient has Malnutrition Diagnosis: Yes  Diagnosis Status: New  Malnutrition Diagnosis: Moderate malnutrition related to chronic disease or condition  As Evidenced by: mild to moderate muscle mass and fat loss    Interventions/Recommendations   Interventions/Recommendations:  Nutrition Prescription  Recommend Ensure Plus BID to aid in wt. maintenance- provides additional 350 kcal's and 13 gm PRO    Monitoring and Evaluation   Monitoring/Evaluation:  Food and Nutrient Related  History  Anthropometrics: Body Composition/Growth/Weight History  Biochemical Data, Medical Tests and Procedures  Nutrition Focused Physical Findings    Follow Up  Time Spent (min): 60 minutes  Last Date of Nutrition Visit: 10/17/23  Nutrition Follow-Up Needed?: Dietitian to reassess per policy

## 2023-10-18 ENCOUNTER — APPOINTMENT (OUTPATIENT)
Dept: CARDIOLOGY | Facility: HOSPITAL | Age: 63
DRG: 917 | End: 2023-10-18
Payer: COMMERCIAL

## 2023-10-18 VITALS
OXYGEN SATURATION: 100 % | RESPIRATION RATE: 24 BRPM | DIASTOLIC BLOOD PRESSURE: 93 MMHG | HEIGHT: 67 IN | HEART RATE: 135 BPM | WEIGHT: 121.47 LBS | BODY MASS INDEX: 19.07 KG/M2 | TEMPERATURE: 98.2 F | SYSTOLIC BLOOD PRESSURE: 145 MMHG

## 2023-10-18 LAB
ALBUMIN SERPL BCP-MCNC: 3.8 G/DL (ref 3.4–5)
ANION GAP SERPL CALC-SCNC: 18 MMOL/L (ref 10–20)
ATRIAL RATE: 114 BPM
BASOPHILS # BLD AUTO: 0.04 X10*3/UL (ref 0–0.1)
BASOPHILS NFR BLD AUTO: 0.4 %
BUN SERPL-MCNC: 6 MG/DL (ref 6–23)
CALCIUM SERPL-MCNC: 9.3 MG/DL (ref 8.6–10.6)
CHLORIDE SERPL-SCNC: 99 MMOL/L (ref 98–107)
CO2 SERPL-SCNC: 23 MMOL/L (ref 21–32)
CREAT SERPL-MCNC: 0.5 MG/DL (ref 0.5–1.05)
EOSINOPHIL # BLD AUTO: 0.07 X10*3/UL (ref 0–0.7)
EOSINOPHIL NFR BLD AUTO: 0.7 %
ERYTHROCYTE [DISTWIDTH] IN BLOOD BY AUTOMATED COUNT: 11.5 % (ref 11.5–14.5)
GFR SERPL CREATININE-BSD FRML MDRD: >90 ML/MIN/1.73M*2
GLUCOSE SERPL-MCNC: 147 MG/DL (ref 74–99)
HCT VFR BLD AUTO: 29.9 % (ref 36–46)
HGB BLD-MCNC: 10.3 G/DL (ref 12–16)
IMM GRANULOCYTES # BLD AUTO: 0.06 X10*3/UL (ref 0–0.7)
IMM GRANULOCYTES NFR BLD AUTO: 0.6 % (ref 0–0.9)
LYMPHOCYTES # BLD AUTO: 1.08 X10*3/UL (ref 1.2–4.8)
LYMPHOCYTES NFR BLD AUTO: 11.5 %
MAGNESIUM SERPL-MCNC: 1.95 MG/DL (ref 1.6–2.4)
MCH RBC QN AUTO: 32.7 PG (ref 26–34)
MCHC RBC AUTO-ENTMCNC: 34.4 G/DL (ref 32–36)
MCV RBC AUTO: 95 FL (ref 80–100)
MONOCYTES # BLD AUTO: 0.86 X10*3/UL (ref 0.1–1)
MONOCYTES NFR BLD AUTO: 9.1 %
NEUTROPHILS # BLD AUTO: 7.32 X10*3/UL (ref 1.2–7.7)
NEUTROPHILS NFR BLD AUTO: 77.7 %
NRBC BLD-RTO: 0 /100 WBCS (ref 0–0)
P AXIS: 38 DEGREES
P OFFSET: 206 MS
P ONSET: 147 MS
PHOSPHATE SERPL-MCNC: 3.3 MG/DL (ref 2.5–4.9)
PLATELET # BLD AUTO: 199 X10*3/UL (ref 150–450)
PMV BLD AUTO: 11.6 FL (ref 7.5–11.5)
POTASSIUM SERPL-SCNC: 3.3 MMOL/L (ref 3.5–5.3)
PR INTERVAL: 148 MS
Q ONSET: 221 MS
QRS COUNT: 18 BEATS
QRS DURATION: 78 MS
QT INTERVAL: 352 MS
QTC CALCULATION(BAZETT): 485 MS
QTC FREDERICIA: 435 MS
R AXIS: -5 DEGREES
RBC # BLD AUTO: 3.15 X10*6/UL (ref 4–5.2)
SODIUM SERPL-SCNC: 137 MMOL/L (ref 136–145)
T AXIS: 41 DEGREES
T OFFSET: 397 MS
VENTRICULAR RATE: 114 BPM
WBC # BLD AUTO: 9.4 X10*3/UL (ref 4.4–11.3)

## 2023-10-18 PROCEDURE — 2500000001 HC RX 250 WO HCPCS SELF ADMINISTERED DRUGS (ALT 637 FOR MEDICARE OP)

## 2023-10-18 PROCEDURE — 93005 ELECTROCARDIOGRAM TRACING: CPT

## 2023-10-18 PROCEDURE — 36415 COLL VENOUS BLD VENIPUNCTURE: CPT

## 2023-10-18 PROCEDURE — 85025 COMPLETE CBC W/AUTO DIFF WBC: CPT | Mod: CMCLAB

## 2023-10-18 PROCEDURE — 2500000004 HC RX 250 GENERAL PHARMACY W/ HCPCS (ALT 636 FOR OP/ED)

## 2023-10-18 PROCEDURE — 99239 HOSP IP/OBS DSCHRG MGMT >30: CPT | Performed by: INTERNAL MEDICINE

## 2023-10-18 PROCEDURE — 83735 ASSAY OF MAGNESIUM: CPT | Mod: CMCLAB

## 2023-10-18 PROCEDURE — 80069 RENAL FUNCTION PANEL: CPT

## 2023-10-18 PROCEDURE — 76937 US GUIDE VASCULAR ACCESS: CPT

## 2023-10-18 RX ORDER — CARVEDILOL 6.25 MG/1
6.25 TABLET ORAL 2 TIMES DAILY
Status: DISCONTINUED | OUTPATIENT
Start: 2023-10-18 | End: 2023-10-18 | Stop reason: HOSPADM

## 2023-10-18 RX ADMIN — ACETAMINOPHEN 975 MG: 325 TABLET ORAL at 02:47

## 2023-10-18 RX ADMIN — ACETAMINOPHEN 975 MG: 325 TABLET ORAL at 10:37

## 2023-10-18 RX ADMIN — NIFEDIPINE 60 MG: 60 TABLET, FILM COATED, EXTENDED RELEASE ORAL at 06:39

## 2023-10-18 RX ADMIN — CIPROFLOXACIN HYDROCHLORIDE 500 MG: 500 TABLET, FILM COATED ORAL at 08:07

## 2023-10-18 RX ADMIN — LOSARTAN POTASSIUM 100 MG: 50 TABLET, FILM COATED ORAL at 08:07

## 2023-10-18 RX ADMIN — HYDRALAZINE HYDROCHLORIDE 25 MG: 25 TABLET, FILM COATED ORAL at 08:07

## 2023-10-18 ASSESSMENT — PAIN - FUNCTIONAL ASSESSMENT
PAIN_FUNCTIONAL_ASSESSMENT: 0-10
PAIN_FUNCTIONAL_ASSESSMENT: 0-10

## 2023-10-18 ASSESSMENT — PAIN SCALES - GENERAL
PAINLEVEL_OUTOF10: 2
PAINLEVEL_OUTOF10: 0 - NO PAIN
PAINLEVEL_OUTOF10: 0 - NO PAIN

## 2023-10-18 NOTE — NURSING NOTE
Patient requested ativan for anxiety. When it was taken to her for her to take she placed it in her purse stating that she would take it later. The pill was retrieved from her purse and placed in the waste container.

## 2023-10-18 NOTE — NURSING NOTE
Patient is becoming more impulsive and uncooperative. She insists that she is going home and is removing her monitoring equipment, attempting to get in and out of bed on her own. She has been saying that her  is on his way since 0700 however he has not yet arrived.

## 2023-10-18 NOTE — NURSING NOTE
The patient left AMA all IV's were removed. She was signed out by her  Eliseo and was transported off of the unit by him via wheelchair. She had all of her belongings in her possession when she left the unit at 12:04.

## 2023-10-18 NOTE — PROGRESS NOTES
Per the notes the patient has been recommended for high intensity rehab. On every occasion that I have spoken to her about rehab she has declined. The patient has also had sitters in her room periodically due to agitation. I was  advised on 10/17 that the patient and her  were requesting that she be dc home on 10/18/2023. I called this morning to speak with Mr Hamman. He had already made it to the hospital. He states, his wife is ready to go home. He was told by the doctors that they wanted to do more blood test and also that they would like to give her IV antibiotics. He states, she is really anxious being here. He plans to take her home. I confirmed with the Team. They are planning to dc her this afternoon on oral antibiotics. As of right now the discharge orders are not in yet. I asked the patients  about a walker. Per Rehab, the patient stated she does not have a walker. Mr Hamman asked his wife again if she has a walker. She replied, yes. Per Mr Hamman he is not sure what she has. He replied, do she need it to get into the car? I explained that she will be wheeled to the car, but for any distance walking our Rehab, recommends a walker for safety.  He did not seem interested in getting one, so I told him I would ask the team for a prescription that he can take to a [facility] or [facility] to have filled.  He was agreeable to this.   Lastly, Sw will met with the patient/spouse to sign the IMM letter.   GENI Garcia

## 2023-10-19 NOTE — DISCHARGE SUMMARY
Attending Note:  I have reviewed and evaluated the most recent data and results, personally examined the patient, and formulated the plan of care as presented above. This patient was critically ill and required continued critical care treatment. Teaching and any separately billable procedures are not included in the time calculation.    Kathleen Hamman is a 62 y.o. female on day 9 of admission presenting with Bradycardia, verapamil overdose, urospesis, bactremia    Neurologically: Awake and alert    Cardiovascular:     Cath report:       Pulmonary:  === 10/09/23 ===    XR CHEST 1 VIEW    - Impression -  1. Trace/small bilateral pleural effusion with improvement in the  aeration of the lungs when compared to prior study  2. Medical lines and devices as detailed above.    Renal: Estimated Creatinine Clearance: 100.2 mL/min (by C-G formula based on SCr of 0.5 mg/dL).  Lab Results   Component Value Date    CREATININE 0.50 10/18/2023       Heme-onc:   Lab Results   Component Value Date    HGB 10.3 (L) 10/18/2023     Lab Results   Component Value Date     10/18/2023        GI:none    Nutrition: Oral intake    Vascular: none    ID:Cipro    Hospital Course:  Very complex situation.  The patient would like to sign AMA.  We have been dealing with this issue for the past 4 days.  Every day after spent over 60 minutes with the patient convincing the patient is staying in the house.  Today she states that her  would be signing her at her house as an AMA.  I did discussion with the psych and other colleagues.  At this point both her and her  have signed out AMA.    Plan:  She will need close follow-up with a primary care doctor.  We will monitor please repeat blood cultures at this point.  Continue with Cipro oral at this time and Augmentin.    I have personally explained the risks of signing AGAINST MEDICAL ADVICE and the complexity and the significance of her bacteremia sepsis infection bradycardia  hemodynamics with her at length in the presence of her  and nursing team and the entire residents and fellow's team.    Billing Provider Critical Care Time: 45 minutes    Red Murray DO

## 2023-10-21 LAB
BACTERIA BLD CULT: NORMAL
BACTERIA BLD CULT: NORMAL

## 2023-10-23 ENCOUNTER — HOSPITAL ENCOUNTER (OUTPATIENT)
Dept: CARDIOLOGY | Facility: HOSPITAL | Age: 63
Discharge: HOME | End: 2023-10-23
Payer: COMMERCIAL

## 2023-10-23 LAB
ATRIAL RATE: 102 BPM
ATRIAL RATE: 104 BPM
ATRIAL RATE: 105 BPM
ATRIAL RATE: 53 BPM
ATRIAL RATE: 81 BPM
P AXIS: 17 DEGREES
P AXIS: 33 DEGREES
P AXIS: 55 DEGREES
P AXIS: 70 DEGREES
P AXIS: 74 DEGREES
P OFFSET: 189 MS
P OFFSET: 198 MS
P OFFSET: 198 MS
P OFFSET: 201 MS
P OFFSET: 207 MS
P ONSET: 131 MS
P ONSET: 138 MS
P ONSET: 149 MS
P ONSET: 156 MS
P ONSET: 165 MS
PR INTERVAL: 116 MS
PR INTERVAL: 136 MS
PR INTERVAL: 146 MS
PR INTERVAL: 160 MS
PR INTERVAL: 182 MS
Q ONSET: 218 MS
Q ONSET: 222 MS
Q ONSET: 222 MS
Q ONSET: 223 MS
Q ONSET: 224 MS
QRS COUNT: 14 BEATS
QRS COUNT: 17 BEATS
QRS COUNT: 8 BEATS
QRS DURATION: 70 MS
QRS DURATION: 72 MS
QRS DURATION: 78 MS
QRS DURATION: 78 MS
QRS DURATION: 98 MS
QT INTERVAL: 304 MS
QT INTERVAL: 360 MS
QT INTERVAL: 388 MS
QT INTERVAL: 388 MS
QT INTERVAL: 690 MS
QTC CALCULATION(BAZETT): 399 MS
QTC CALCULATION(BAZETT): 450 MS
QTC CALCULATION(BAZETT): 469 MS
QTC CALCULATION(BAZETT): 512 MS
QTC CALCULATION(BAZETT): 647 MS
QTC FREDERICIA: 365 MS
QTC FREDERICIA: 428 MS
QTC FREDERICIA: 429 MS
QTC FREDERICIA: 467 MS
QTC FREDERICIA: 662 MS
R AXIS: 18 DEGREES
R AXIS: 22 DEGREES
R AXIS: 32 DEGREES
R AXIS: 45 DEGREES
R AXIS: 60 DEGREES
T AXIS: -42 DEGREES
T AXIS: 15 DEGREES
T AXIS: 38 DEGREES
T AXIS: 52 DEGREES
T AXIS: 74 DEGREES
T OFFSET: 374 MS
T OFFSET: 403 MS
T OFFSET: 416 MS
T OFFSET: 418 MS
T OFFSET: 563 MS
VENTRICULAR RATE: 102 BPM
VENTRICULAR RATE: 104 BPM
VENTRICULAR RATE: 105 BPM
VENTRICULAR RATE: 53 BPM
VENTRICULAR RATE: 81 BPM

## 2024-12-04 ENCOUNTER — HOSPITAL ENCOUNTER (INPATIENT)
Facility: HOSPITAL | Age: 64
End: 2024-12-04
Attending: STUDENT IN AN ORGANIZED HEALTH CARE EDUCATION/TRAINING PROGRAM | Admitting: STUDENT IN AN ORGANIZED HEALTH CARE EDUCATION/TRAINING PROGRAM
Payer: COMMERCIAL

## (undated) DEVICE — ACCESS KIT, S-MAK MINI, 4FR 10CM 0.018IN 40CM, NT/PT, ECHO ENHANCE NEEDLE

## (undated) DEVICE — CATHETER, PACING, PACEL, FLOW DIRECTED, 5 FR X 110 CM

## (undated) DEVICE — CATHETER, THERMODILUTION, SWAN GANZ, VIP, 5 LUMEN, 7.5 FR, 110 CM

## (undated) DEVICE — INTRODUCER, SHEATH, FAST-CATH, 7 FR X 23 CM

## (undated) DEVICE — CABLE, PACING PATIENT BIPOLAR 8', BLUE

## (undated) DEVICE — INTRODUCER KIT, HEMOSTASIS, VALVE/SIDEPORT, W/GUIDEWIRE, 0.035 IN, 8.5 FR, 10 CM, LF

## (undated) DEVICE — SLEEVE, REPOSITIONING, W/C-LOCK ADAPTER, 60 CM